# Patient Record
Sex: MALE | Race: WHITE | URBAN - METROPOLITAN AREA
[De-identification: names, ages, dates, MRNs, and addresses within clinical notes are randomized per-mention and may not be internally consistent; named-entity substitution may affect disease eponyms.]

---

## 2017-01-01 ENCOUNTER — NURSING HOME VISIT (OUTPATIENT)
Dept: GERIATRICS | Facility: CLINIC | Age: 82
End: 2017-01-01
Payer: MEDICARE

## 2017-01-01 ENCOUNTER — HOSPITAL ENCOUNTER (INPATIENT)
Facility: CLINIC | Age: 82
LOS: 13 days | Discharge: HOSPICE | DRG: 871 | End: 2017-09-29
Attending: EMERGENCY MEDICINE | Admitting: INTERNAL MEDICINE
Payer: MEDICARE

## 2017-01-01 ENCOUNTER — APPOINTMENT (OUTPATIENT)
Dept: PHYSICAL THERAPY | Facility: CLINIC | Age: 82
DRG: 871 | End: 2017-01-01
Attending: HOSPITALIST
Payer: MEDICARE

## 2017-01-01 ENCOUNTER — APPOINTMENT (OUTPATIENT)
Dept: SPEECH THERAPY | Facility: CLINIC | Age: 82
DRG: 871 | End: 2017-01-01
Payer: MEDICARE

## 2017-01-01 ENCOUNTER — APPOINTMENT (OUTPATIENT)
Dept: GENERAL RADIOLOGY | Facility: CLINIC | Age: 82
DRG: 871 | End: 2017-01-01
Attending: HOSPITALIST
Payer: MEDICARE

## 2017-01-01 ENCOUNTER — APPOINTMENT (OUTPATIENT)
Dept: OCCUPATIONAL THERAPY | Facility: CLINIC | Age: 82
DRG: 871 | End: 2017-01-01
Payer: MEDICARE

## 2017-01-01 ENCOUNTER — APPOINTMENT (OUTPATIENT)
Dept: GENERAL RADIOLOGY | Facility: CLINIC | Age: 82
DRG: 871 | End: 2017-01-01
Attending: INTERNAL MEDICINE
Payer: MEDICARE

## 2017-01-01 ENCOUNTER — OFFICE VISIT (OUTPATIENT)
Dept: FAMILY MEDICINE | Facility: CLINIC | Age: 82
End: 2017-01-01

## 2017-01-01 ENCOUNTER — APPOINTMENT (OUTPATIENT)
Dept: SPEECH THERAPY | Facility: CLINIC | Age: 82
DRG: 871 | End: 2017-01-01
Attending: HOSPITALIST
Payer: MEDICARE

## 2017-01-01 ENCOUNTER — APPOINTMENT (OUTPATIENT)
Dept: GENERAL RADIOLOGY | Facility: CLINIC | Age: 82
DRG: 871 | End: 2017-01-01
Attending: EMERGENCY MEDICINE
Payer: MEDICARE

## 2017-01-01 ENCOUNTER — APPOINTMENT (OUTPATIENT)
Dept: OCCUPATIONAL THERAPY | Facility: CLINIC | Age: 82
DRG: 871 | End: 2017-01-01
Attending: HOSPITALIST
Payer: MEDICARE

## 2017-01-01 ENCOUNTER — APPOINTMENT (OUTPATIENT)
Dept: CT IMAGING | Facility: CLINIC | Age: 82
DRG: 871 | End: 2017-01-01
Attending: INTERNAL MEDICINE
Payer: MEDICARE

## 2017-01-01 ENCOUNTER — APPOINTMENT (OUTPATIENT)
Dept: CARDIOLOGY | Facility: CLINIC | Age: 82
DRG: 871 | End: 2017-01-01
Attending: INTERNAL MEDICINE
Payer: MEDICARE

## 2017-01-01 VITALS
RESPIRATION RATE: 20 BRPM | TEMPERATURE: 98.8 F | SYSTOLIC BLOOD PRESSURE: 121 MMHG | BODY MASS INDEX: 17.14 KG/M2 | DIASTOLIC BLOOD PRESSURE: 77 MMHG | HEART RATE: 125 BPM | OXYGEN SATURATION: 97 % | OXYGEN SATURATION: 90 % | WEIGHT: 144.5 LBS | RESPIRATION RATE: 16 BRPM | WEIGHT: 144.5 LBS | HEART RATE: 98 BPM | BODY MASS INDEX: 17.14 KG/M2 | SYSTOLIC BLOOD PRESSURE: 107 MMHG | DIASTOLIC BLOOD PRESSURE: 68 MMHG | TEMPERATURE: 98.4 F

## 2017-01-01 VITALS
RESPIRATION RATE: 16 BRPM | OXYGEN SATURATION: 92 % | HEART RATE: 90 BPM | BODY MASS INDEX: 17.03 KG/M2 | DIASTOLIC BLOOD PRESSURE: 68 MMHG | SYSTOLIC BLOOD PRESSURE: 118 MMHG | HEIGHT: 77 IN | WEIGHT: 144.2 LBS

## 2017-01-01 VITALS
WEIGHT: 140 LBS | OXYGEN SATURATION: 98 % | DIASTOLIC BLOOD PRESSURE: 74 MMHG | HEART RATE: 94 BPM | BODY MASS INDEX: 16.53 KG/M2 | RESPIRATION RATE: 16 BRPM | TEMPERATURE: 97.5 F | SYSTOLIC BLOOD PRESSURE: 106 MMHG | HEIGHT: 77 IN

## 2017-01-01 VITALS
SYSTOLIC BLOOD PRESSURE: 110 MMHG | DIASTOLIC BLOOD PRESSURE: 76 MMHG | BODY MASS INDEX: 16.65 KG/M2 | HEART RATE: 84 BPM | HEIGHT: 77 IN | OXYGEN SATURATION: 96 % | TEMPERATURE: 98 F | WEIGHT: 141 LBS | RESPIRATION RATE: 16 BRPM

## 2017-01-01 VITALS
TEMPERATURE: 99 F | RESPIRATION RATE: 20 BRPM | HEART RATE: 105 BPM | DIASTOLIC BLOOD PRESSURE: 73 MMHG | OXYGEN SATURATION: 93 % | BODY MASS INDEX: 17.14 KG/M2 | SYSTOLIC BLOOD PRESSURE: 107 MMHG | WEIGHT: 144.5 LBS

## 2017-01-01 VITALS
HEART RATE: 71 BPM | DIASTOLIC BLOOD PRESSURE: 62 MMHG | RESPIRATION RATE: 16 BRPM | SYSTOLIC BLOOD PRESSURE: 103 MMHG | WEIGHT: 144.4 LBS | BODY MASS INDEX: 17.12 KG/M2 | OXYGEN SATURATION: 92 % | TEMPERATURE: 98.7 F

## 2017-01-01 VITALS
HEART RATE: 101 BPM | OXYGEN SATURATION: 95 % | WEIGHT: 144.5 LBS | RESPIRATION RATE: 16 BRPM | DIASTOLIC BLOOD PRESSURE: 63 MMHG | SYSTOLIC BLOOD PRESSURE: 93 MMHG | TEMPERATURE: 98.8 F | BODY MASS INDEX: 17.14 KG/M2

## 2017-01-01 DIAGNOSIS — I50.9 HEART FAILURE, UNSPECIFIED HEART FAILURE CHRONICITY, UNSPECIFIED HEART FAILURE TYPE: ICD-10-CM

## 2017-01-01 DIAGNOSIS — R09.81 NASAL CONGESTION: ICD-10-CM

## 2017-01-01 DIAGNOSIS — R19.5 LOOSE STOOLS: ICD-10-CM

## 2017-01-01 DIAGNOSIS — Z87.09 HISTORY OF COPD: ICD-10-CM

## 2017-01-01 DIAGNOSIS — R31.9 HEMATURIA: ICD-10-CM

## 2017-01-01 DIAGNOSIS — I24.89 DEMAND ISCHEMIA (H): ICD-10-CM

## 2017-01-01 DIAGNOSIS — A41.9 SEVERE SEPSIS (H): ICD-10-CM

## 2017-01-01 DIAGNOSIS — Z51.5 HOSPICE CARE PATIENT: ICD-10-CM

## 2017-01-01 DIAGNOSIS — R65.20 SEVERE SEPSIS (H): ICD-10-CM

## 2017-01-01 DIAGNOSIS — J44.9 CHRONIC OBSTRUCTIVE PULMONARY DISEASE, UNSPECIFIED COPD TYPE (H): ICD-10-CM

## 2017-01-01 DIAGNOSIS — R31.9 BLOOD IN URINE: Primary | ICD-10-CM

## 2017-01-01 DIAGNOSIS — G89.3 CANCER ASSOCIATED PAIN: ICD-10-CM

## 2017-01-01 DIAGNOSIS — J96.01 ACUTE RESPIRATORY FAILURE WITH HYPOXIA (H): ICD-10-CM

## 2017-01-01 DIAGNOSIS — N32.89 BLADDER MASS: ICD-10-CM

## 2017-01-01 DIAGNOSIS — Z23 NEED FOR PNEUMOCOCCAL VACCINE: ICD-10-CM

## 2017-01-01 DIAGNOSIS — G50.0 TRIGEMINAL NEURALGIA: ICD-10-CM

## 2017-01-01 DIAGNOSIS — J18.9 PNEUMONIA DUE TO INFECTIOUS ORGANISM, UNSPECIFIED LATERALITY, UNSPECIFIED PART OF LUNG: Primary | ICD-10-CM

## 2017-01-01 DIAGNOSIS — G47.00 INSOMNIA, UNSPECIFIED TYPE: Primary | ICD-10-CM

## 2017-01-01 DIAGNOSIS — J18.9 PNEUMONIA DUE TO INFECTIOUS ORGANISM, UNSPECIFIED LATERALITY, UNSPECIFIED PART OF LUNG: ICD-10-CM

## 2017-01-01 DIAGNOSIS — R53.83 FATIGUE, UNSPECIFIED TYPE: ICD-10-CM

## 2017-01-01 DIAGNOSIS — Z85.51 PERSONAL HISTORY OF MALIGNANT NEOPLASM OF BLADDER: Primary | ICD-10-CM

## 2017-01-01 DIAGNOSIS — I71.40 ABDOMINAL AORTIC ANEURYSM (AAA) WITHOUT RUPTURE (H): ICD-10-CM

## 2017-01-01 DIAGNOSIS — Z76.0 ENCOUNTER FOR MEDICATION REFILL: ICD-10-CM

## 2017-01-01 DIAGNOSIS — J44.9 CHRONIC OBSTRUCTIVE PULMONARY DISEASE, UNSPECIFIED COPD TYPE (H): Primary | ICD-10-CM

## 2017-01-01 DIAGNOSIS — K40.90 HERNIA, INGUINAL, RIGHT: ICD-10-CM

## 2017-01-01 DIAGNOSIS — R82.90 ABNORMAL URINALYSIS: ICD-10-CM

## 2017-01-01 DIAGNOSIS — R64 CACHECTIC (H): Primary | ICD-10-CM

## 2017-01-01 DIAGNOSIS — G47.30 SLEEP APNEA, UNSPECIFIED TYPE: ICD-10-CM

## 2017-01-01 DIAGNOSIS — N17.9 ACUTE KIDNEY INJURY (H): ICD-10-CM

## 2017-01-01 DIAGNOSIS — Z51.5 END OF LIFE CARE: ICD-10-CM

## 2017-01-01 LAB
% GRANULOCYTES: 75.1 % (ref 42.2–75.2)
% GRANULOCYTES: 82.4 % (ref 42.2–75.2)
ALBUMIN SERPL-MCNC: 1.4 G/DL (ref 3.4–5)
ALBUMIN SERPL-MCNC: 1.6 G/DL (ref 3.4–5)
ALBUMIN SERPL-MCNC: 2 G/DL (ref 3.4–5)
ALBUMIN SERPL-MCNC: 4 G/DL (ref 3.5–4.7)
ALBUMIN SERPL-MCNC: 4 G/DL (ref 3.5–4.7)
ALBUMIN UR-MCNC: 30 MG/DL
ALBUMIN/GLOB SERPL: 1.6 {RATIO} (ref 1.2–2.2)
ALBUMIN/GLOB SERPL: 1.7 {RATIO} (ref 1.2–2.2)
ALP SERPL-CCNC: 126 U/L (ref 40–150)
ALP SERPL-CCNC: 87 IU/L (ref 39–117)
ALP SERPL-CCNC: 89 IU/L (ref 39–117)
ALP SERPL-CCNC: 93 U/L (ref 40–150)
ALT SERPL W P-5'-P-CCNC: 15 U/L (ref 0–70)
ALT SERPL W P-5'-P-CCNC: 29 U/L (ref 0–70)
ALT SERPL-CCNC: 10 IU/L (ref 0–44)
ALT SERPL-CCNC: 10 IU/L (ref 0–44)
ANION GAP SERPL CALCULATED.3IONS-SCNC: 13 MMOL/L (ref 3–14)
ANION GAP SERPL CALCULATED.3IONS-SCNC: 6 MMOL/L (ref 3–14)
ANION GAP SERPL CALCULATED.3IONS-SCNC: 7 MMOL/L (ref 3–14)
ANION GAP SERPL CALCULATED.3IONS-SCNC: 8 MMOL/L (ref 3–14)
ANION GAP SERPL CALCULATED.3IONS-SCNC: 8 MMOL/L (ref 3–14)
APPEARANCE UR: ABNORMAL
AST SERPL W P-5'-P-CCNC: 20 U/L (ref 0–45)
AST SERPL W P-5'-P-CCNC: 35 U/L (ref 0–45)
AST SERPL-CCNC: 18 IU/L (ref 0–40)
AST SERPL-CCNC: 22 IU/L (ref 0–40)
BACTERIA SPEC CULT: ABNORMAL
BACTERIA SPEC CULT: NO GROWTH
BACTERIA URINE: 0
BASOPHILS # BLD AUTO: 0 10E9/L (ref 0–0.2)
BASOPHILS # BLD AUTO: 0.1 10E9/L (ref 0–0.2)
BASOPHILS NFR BLD AUTO: 0.1 %
BASOPHILS NFR BLD AUTO: 0.2 %
BILIRUB SERPL-MCNC: 0.4 MG/DL (ref 0.2–1.3)
BILIRUB SERPL-MCNC: 0.5 MG/DL (ref 0–1.2)
BILIRUB SERPL-MCNC: 0.5 MG/DL (ref 0–1.2)
BILIRUB SERPL-MCNC: 0.8 MG/DL (ref 0.2–1.3)
BILIRUB UR QL STRIP: ABNORMAL
BILIRUB UR QL STRIP: NEGATIVE
BLOOD URINE DIP: ABNORMAL
BUN SERPL-MCNC: 17 MG/DL (ref 8–27)
BUN SERPL-MCNC: 19 MG/DL (ref 8–27)
BUN SERPL-MCNC: 38 MG/DL (ref 7–30)
BUN SERPL-MCNC: 50 MG/DL (ref 7–30)
BUN SERPL-MCNC: 52 MG/DL (ref 7–30)
BUN SERPL-MCNC: 69 MG/DL (ref 7–30)
BUN SERPL-MCNC: 85 MG/DL (ref 7–30)
BUN/CREATININE RATIO: 24 (ref 10–24)
BUN/CREATININE RATIO: 27 (ref 10–24)
CALCIUM SERPL-MCNC: 7.8 MG/DL (ref 8.5–10.1)
CALCIUM SERPL-MCNC: 8 MG/DL (ref 8.5–10.1)
CALCIUM SERPL-MCNC: 8 MG/DL (ref 8.5–10.1)
CALCIUM SERPL-MCNC: 8.2 MG/DL (ref 8.5–10.1)
CALCIUM SERPL-MCNC: 8.8 MG/DL (ref 8.5–10.1)
CALCIUM SERPL-MCNC: 9.1 MG/DL (ref 8.6–10.2)
CALCIUM SERPL-MCNC: 9.2 MG/DL (ref 8.6–10.2)
CASTS/LPF: 0
CHLORIDE SERPL-SCNC: 100 MMOL/L (ref 94–109)
CHLORIDE SERPL-SCNC: 100 MMOL/L (ref 94–109)
CHLORIDE SERPL-SCNC: 101 MMOL/L (ref 94–109)
CHLORIDE SERPL-SCNC: 104 MMOL/L (ref 94–109)
CHLORIDE SERPL-SCNC: 107 MMOL/L (ref 94–109)
CHLORIDE SERPLBLD-SCNC: 103 MMOL/L (ref 96–106)
CHLORIDE SERPLBLD-SCNC: 98 MMOL/L (ref 96–106)
CHOLEST SERPL-MCNC: 58 MG/DL
CO2 BLDCOV-SCNC: 23 MMOL/L (ref 21–28)
CO2 SERPL-SCNC: 23 MMOL/L (ref 20–32)
CO2 SERPL-SCNC: 24 MMOL/L (ref 20–32)
CO2 SERPL-SCNC: 27 MMOL/L (ref 20–32)
CO2 SERPL-SCNC: 33 MMOL/L (ref 20–32)
CO2 SERPL-SCNC: 34 MMOL/L (ref 20–32)
COLOR UR AUTO: YELLOW
COLOR UR: ABNORMAL
CREAT SERPL-MCNC: 0.66 MG/DL (ref 0.66–1.25)
CREAT SERPL-MCNC: 0.7 MG/DL (ref 0.76–1.27)
CREAT SERPL-MCNC: 0.71 MG/DL (ref 0.76–1.27)
CREAT SERPL-MCNC: 0.72 MG/DL (ref 0.66–1.25)
CREAT SERPL-MCNC: 0.76 MG/DL (ref 0.66–1.25)
CREAT SERPL-MCNC: 0.94 MG/DL (ref 0.66–1.25)
CREAT SERPL-MCNC: 1.36 MG/DL (ref 0.66–1.25)
CREAT SERPL-MCNC: 1.97 MG/DL (ref 0.66–1.25)
CRYSTAL URINE: 0
DIFFERENTIAL METHOD BLD: ABNORMAL
EGFR IF AFRICN AM: 102 ML/MIN/1.73
EGFR IF AFRICN AM: 102 ML/MIN/1.73
EGFR IF NONAFRICN AM: 88 ML/MIN/1.73
EGFR IF NONAFRICN AM: 89 ML/MIN/1.73
EOSINOPHIL # BLD AUTO: 0 10E9/L (ref 0–0.7)
EOSINOPHIL # BLD AUTO: 0.1 10E9/L (ref 0–0.7)
EOSINOPHIL NFR BLD AUTO: 0 %
EOSINOPHIL NFR BLD AUTO: 0 %
EOSINOPHIL NFR BLD AUTO: 0.1 %
EOSINOPHIL NFR BLD AUTO: 0.4 %
EPITHELIAL CELLS - QUEST: ABNORMAL
ERYTHROCYTE [DISTWIDTH] IN BLOOD BY AUTOMATED COUNT: 13.7 % (ref 10–15)
ERYTHROCYTE [DISTWIDTH] IN BLOOD BY AUTOMATED COUNT: 13.7 % (ref 10–15)
ERYTHROCYTE [DISTWIDTH] IN BLOOD BY AUTOMATED COUNT: 13.8 % (ref 10–15)
ERYTHROCYTE [DISTWIDTH] IN BLOOD BY AUTOMATED COUNT: 13.9 % (ref 10–15)
ERYTHROCYTE [DISTWIDTH] IN BLOOD BY AUTOMATED COUNT: 14 % (ref 10–15)
GFR SERPL CREATININE-BSD FRML MDRD: 33 ML/MIN/1.7M2
GFR SERPL CREATININE-BSD FRML MDRD: 50 ML/MIN/1.7M2
GFR SERPL CREATININE-BSD FRML MDRD: 77 ML/MIN/1.7M2
GFR SERPL CREATININE-BSD FRML MDRD: >90 ML/MIN/1.7M2
GLOBULIN, TOTAL: 2.3 G/DL (ref 1.5–4.5)
GLOBULIN, TOTAL: 2.5 G/DL (ref 1.5–4.5)
GLUCOSE BLDC GLUCOMTR-MCNC: 145 MG/DL (ref 70–99)
GLUCOSE SERPL-MCNC: 144 MG/DL (ref 70–99)
GLUCOSE SERPL-MCNC: 146 MG/DL (ref 70–99)
GLUCOSE SERPL-MCNC: 148 MG/DL (ref 70–99)
GLUCOSE SERPL-MCNC: 154 MG/DL (ref 70–99)
GLUCOSE SERPL-MCNC: 74 MG/DL (ref 65–99)
GLUCOSE SERPL-MCNC: 97 MG/DL (ref 65–99)
GLUCOSE SERPL-MCNC: 98 MG/DL (ref 70–99)
GLUCOSE UR STRIP-MCNC: 0 MG/DL
GLUCOSE UR STRIP-MCNC: NEGATIVE MG/DL
GRAM STN SPEC: ABNORMAL
HCT VFR BLD AUTO: 40.2 % (ref 39–51)
HCT VFR BLD AUTO: 41 % (ref 40–53)
HCT VFR BLD AUTO: 41.1 % (ref 39–51)
HCT VFR BLD AUTO: 42.1 % (ref 40–53)
HCT VFR BLD AUTO: 42.8 % (ref 40–53)
HCT VFR BLD AUTO: 44.7 % (ref 40–53)
HCT VFR BLD AUTO: 45.7 % (ref 40–53)
HDLC SERPL-MCNC: 13 MG/DL
HEMOGLOBIN: 13.5 G/DL (ref 13.4–17.5)
HEMOGLOBIN: 13.5 G/DL (ref 13.4–17.5)
HGB BLD-MCNC: 13.6 G/DL (ref 13.3–17.7)
HGB BLD-MCNC: 14.2 G/DL (ref 13.3–17.7)
HGB BLD-MCNC: 14.7 G/DL (ref 13.3–17.7)
HGB BLD-MCNC: 14.9 G/DL (ref 13.3–17.7)
HGB BLD-MCNC: 15.6 G/DL (ref 13.3–17.7)
HGB UR QL STRIP: ABNORMAL
HYALINE CASTS #/AREA URNS LPF: 4 /LPF (ref 0–2)
IMM GRANULOCYTES # BLD: 0.3 10E9/L (ref 0–0.4)
IMM GRANULOCYTES # BLD: 0.6 10E9/L (ref 0–0.4)
IMM GRANULOCYTES # BLD: 0.8 10E9/L (ref 0–0.4)
IMM GRANULOCYTES # BLD: 1.2 10E9/L (ref 0–0.4)
IMM GRANULOCYTES NFR BLD: 1.7 %
IMM GRANULOCYTES NFR BLD: 2.3 %
IMM GRANULOCYTES NFR BLD: 4.5 %
IMM GRANULOCYTES NFR BLD: 4.7 %
INTERPRETATION ECG - MUSE: NORMAL
KETONES UR QL STRIP: ABNORMAL
KETONES UR STRIP-MCNC: NEGATIVE MG/DL
LACTATE BLD-SCNC: 0.9 MMOL/L (ref 0.7–2)
LACTATE BLD-SCNC: 1 MMOL/L (ref 0.7–2)
LACTATE BLD-SCNC: 1.3 MMOL/L (ref 0.7–2)
LACTATE BLD-SCNC: 1.6 MMOL/L (ref 0.7–2)
LACTATE BLD-SCNC: 1.6 MMOL/L (ref 0.7–2)
LACTATE BLD-SCNC: 1.8 MMOL/L (ref 0.7–2)
LACTATE BLD-SCNC: 2.2 MMOL/L (ref 0.7–2.1)
LACTATE SERPL-SCNC: 1.6 MMOL/L (ref 0.4–2)
LDLC SERPL CALC-MCNC: 26 MG/DL
LEUKOCYTE ESTERASE UR QL STRIP: NEGATIVE
LEUKOCYTE ESTERASE URINE DIP: 0
LYMPHOCYTES # BLD AUTO: 0.5 10E9/L (ref 0.8–5.3)
LYMPHOCYTES # BLD AUTO: 0.9 10E9/L (ref 0.8–5.3)
LYMPHOCYTES # BLD AUTO: 1 10E9/L (ref 0.8–5.3)
LYMPHOCYTES # BLD AUTO: 1 10E9/L (ref 0.8–5.3)
LYMPHOCYTES NFR BLD AUTO: 13.7 % (ref 20.5–51.1)
LYMPHOCYTES NFR BLD AUTO: 19.2 % (ref 20.5–51.1)
LYMPHOCYTES NFR BLD AUTO: 3.3 %
LYMPHOCYTES NFR BLD AUTO: 3.6 %
LYMPHOCYTES NFR BLD AUTO: 4.2 %
LYMPHOCYTES NFR BLD AUTO: 6 %
Lab: ABNORMAL
Lab: ABNORMAL
Lab: NORMAL
MAGNESIUM SERPL-MCNC: 2.3 MG/DL (ref 1.6–2.3)
MCH RBC QN AUTO: 30.9 PG (ref 27–31)
MCH RBC QN AUTO: 31.8 PG (ref 27–31)
MCH RBC QN AUTO: 32 PG (ref 26.5–33)
MCH RBC QN AUTO: 32.1 PG (ref 26.5–33)
MCH RBC QN AUTO: 32.2 PG (ref 26.5–33)
MCH RBC QN AUTO: 32.4 PG (ref 26.5–33)
MCH RBC QN AUTO: 32.9 PG (ref 26.5–33)
MCHC RBC AUTO-ENTMCNC: 32.9 G/DL (ref 31.5–36.5)
MCHC RBC AUTO-ENTMCNC: 32.9 G/DL (ref 33–37)
MCHC RBC AUTO-ENTMCNC: 33.2 G/DL (ref 31.5–36.5)
MCHC RBC AUTO-ENTMCNC: 33.2 G/DL (ref 31.5–36.5)
MCHC RBC AUTO-ENTMCNC: 33.5 G/DL (ref 33–37)
MCHC RBC AUTO-ENTMCNC: 34.1 G/DL (ref 31.5–36.5)
MCHC RBC AUTO-ENTMCNC: 35.4 G/DL (ref 31.5–36.5)
MCV RBC AUTO: 93 FL (ref 78–100)
MCV RBC AUTO: 93.8 FL (ref 80–100)
MCV RBC AUTO: 94.9 FL (ref 80–100)
MCV RBC AUTO: 95 FL (ref 78–100)
MCV RBC AUTO: 97 FL (ref 78–100)
MONOCYTES # BLD AUTO: 0.4 10E9/L (ref 0–1.3)
MONOCYTES # BLD AUTO: 0.7 10E9/L (ref 0–1.3)
MONOCYTES # BLD AUTO: 1.5 10E9/L (ref 0–1.3)
MONOCYTES # BLD AUTO: 1.6 10E9/L (ref 0–1.3)
MONOCYTES NFR BLD AUTO: 2.6 %
MONOCYTES NFR BLD AUTO: 2.7 %
MONOCYTES NFR BLD AUTO: 3.9 % (ref 1.7–9.3)
MONOCYTES NFR BLD AUTO: 5.7 % (ref 1.7–9.3)
MONOCYTES NFR BLD AUTO: 6.3 %
MONOCYTES NFR BLD AUTO: 9.6 %
MUCOUS THREADS #/AREA URNS LPF: PRESENT /LPF
MUCOUS URINE: 0
NEUTROPHILS # BLD AUTO: 13.4 10E9/L (ref 1.6–8.3)
NEUTROPHILS # BLD AUTO: 14.7 10E9/L (ref 1.6–8.3)
NEUTROPHILS # BLD AUTO: 20.8 10E9/L (ref 1.6–8.3)
NEUTROPHILS # BLD AUTO: 22.2 10E9/L (ref 1.6–8.3)
NEUTROPHILS NFR BLD AUTO: 84.2 %
NEUTROPHILS NFR BLD AUTO: 85.1 %
NEUTROPHILS NFR BLD AUTO: 86.7 %
NEUTROPHILS NFR BLD AUTO: 91.3 %
NITRATE UR QL: NEGATIVE
NITRITE UR QL STRIP: ABNORMAL
NONHDLC SERPL-MCNC: 45 MG/DL
NRBC # BLD AUTO: 0 10*3/UL
NRBC BLD AUTO-RTO: 0 /100
NT-PROBNP SERPL-MCNC: 962 PG/ML (ref 0–1800)
PCO2 BLDV: 34 MM HG (ref 40–50)
PH BLDV: 7.43 PH (ref 7.32–7.43)
PH UR STRIP: 5.5 PH (ref 5–7)
PH UR STRIP: 6.5 PH (ref 5–9)
PHOSPHATE SERPL-MCNC: 1.5 MG/DL (ref 2.5–4.5)
PLATELET # BLD AUTO: 165 K/UL (ref 140–450)
PLATELET # BLD AUTO: 170 K/UL (ref 140–450)
PLATELET # BLD AUTO: 249 10E9/L (ref 150–450)
PLATELET # BLD AUTO: 253 10E9/L (ref 150–450)
PLATELET # BLD AUTO: 259 10E9/L (ref 150–450)
PLATELET # BLD AUTO: 290 10E9/L (ref 150–450)
PLATELET # BLD AUTO: 315 10E9/L (ref 150–450)
PLATELET # BLD AUTO: 405 10E9/L (ref 150–450)
PO2 BLDV: 59 MM HG (ref 25–47)
POTASSIUM SERPL-SCNC: 3.2 MMOL/L (ref 3.4–5.3)
POTASSIUM SERPL-SCNC: 3.4 MMOL/L (ref 3.4–5.3)
POTASSIUM SERPL-SCNC: 3.5 MMOL/L (ref 3.4–5.3)
POTASSIUM SERPL-SCNC: 3.8 MMOL/L (ref 3.4–5.3)
POTASSIUM SERPL-SCNC: 3.9 MMOL/L (ref 3.4–5.3)
POTASSIUM SERPL-SCNC: 4.1 MMOL/L (ref 3.5–5.2)
POTASSIUM SERPL-SCNC: 4.4 MMOL/L (ref 3.5–5.2)
PROCALCITONIN SERPL-MCNC: 3.44 NG/ML
PROCALCITONIN SERPL-MCNC: 5.22 NG/ML
PROT SERPL-MCNC: 5.4 G/DL (ref 6.8–8.8)
PROT SERPL-MCNC: 6.2 G/DL (ref 6.8–8.8)
PROT SERPL-MCNC: 6.3 G/DL (ref 6–8.5)
PROT SERPL-MCNC: 6.5 G/DL (ref 6–8.5)
PROT UR QL: ABNORMAL MG/DL (ref ?–0.01)
RBC # BLD AUTO: 4.23 X10/CMM (ref 4.2–5.9)
RBC # BLD AUTO: 4.24 10E12/L (ref 4.4–5.9)
RBC # BLD AUTO: 4.38 X10/CMM (ref 4.2–5.9)
RBC # BLD AUTO: 4.41 10E12/L (ref 4.4–5.9)
RBC # BLD AUTO: 4.53 10E12/L (ref 4.4–5.9)
RBC # BLD AUTO: 4.6 10E12/L (ref 4.4–5.9)
RBC # BLD AUTO: 4.81 10E12/L (ref 4.4–5.9)
RBC #/AREA URNS AUTO: 7 /HPF (ref 0–2)
RBC URINE: ABNORMAL (ref 0–3)
S PNEUM AG SPEC QL: NORMAL
SAO2 % BLDV FROM PO2: 91 %
SODIUM SERPL-SCNC: 136 MMOL/L (ref 133–144)
SODIUM SERPL-SCNC: 137 MMOL/L (ref 134–144)
SODIUM SERPL-SCNC: 139 MMOL/L (ref 133–144)
SODIUM SERPL-SCNC: 139 MMOL/L (ref 133–144)
SODIUM SERPL-SCNC: 140 MMOL/L (ref 133–144)
SODIUM SERPL-SCNC: 141 MMOL/L (ref 133–144)
SODIUM SERPL-SCNC: 142 MMOL/L (ref 134–144)
SOURCE: ABNORMAL
SP GR UR STRIP: 1.02 (ref 1–1.02)
SP GR UR STRIP: 1.02 (ref 1–1.03)
SPECIMEN SOURCE: ABNORMAL
SPECIMEN SOURCE: NORMAL
T4 FREE SERPL-MCNC: 1.33 NG/DL (ref 0.82–1.77)
TOTAL CO2: 26 MMOL/L (ref 18–28)
TOTAL CO2: 27 MMOL/L (ref 18–28)
TRIGL SERPL-MCNC: 97 MG/DL
TROPONIN I SERPL-MCNC: 0.47 UG/L (ref 0–0.04)
TROPONIN I SERPL-MCNC: 0.57 UG/L (ref 0–0.04)
TROPONIN I SERPL-MCNC: 0.97 UG/L (ref 0–0.04)
TSH BLD-ACNC: 1.77 UIU/ML (ref 0.45–4.5)
UROBILINOGEN UR QL STRIP: 1 EU/DL (ref 0.2–1)
UROBILINOGEN UR STRIP-MCNC: NORMAL MG/DL (ref 0–2)
WBC # BLD AUTO: 15.7 10E9/L (ref 4–11)
WBC # BLD AUTO: 17 10E9/L (ref 4–11)
WBC # BLD AUTO: 19.4 10E9/L (ref 4–11)
WBC # BLD AUTO: 24.3 10E9/L (ref 4–11)
WBC # BLD AUTO: 24.7 10E9/L (ref 4–11)
WBC # BLD AUTO: 7.2 X10/CMM (ref 3.8–11)
WBC # BLD AUTO: 9.9 X10/CMM (ref 3.8–11)
WBC #/AREA URNS AUTO: 13 /HPF (ref 0–2)
WBC URINE: 0 (ref 0–3)

## 2017-01-01 PROCEDURE — A9270 NON-COVERED ITEM OR SERVICE: HCPCS | Mod: GY | Performed by: INTERNAL MEDICINE

## 2017-01-01 PROCEDURE — 92526 ORAL FUNCTION THERAPY: CPT | Mod: GN | Performed by: SPEECH-LANGUAGE PATHOLOGIST

## 2017-01-01 PROCEDURE — 36415 COLL VENOUS BLD VENIPUNCTURE: CPT | Performed by: INTERNAL MEDICINE

## 2017-01-01 PROCEDURE — 99223 1ST HOSP IP/OBS HIGH 75: CPT | Mod: AI | Performed by: INTERNAL MEDICINE

## 2017-01-01 PROCEDURE — 99309 SBSQ NF CARE MODERATE MDM 30: CPT | Mod: GW | Performed by: NURSE PRACTITIONER

## 2017-01-01 PROCEDURE — 99214 OFFICE O/P EST MOD 30 MIN: CPT | Performed by: FAMILY MEDICINE

## 2017-01-01 PROCEDURE — 40000133 ZZH STATISTIC OT WARD VISIT: Performed by: OCCUPATIONAL THERAPY ASSISTANT

## 2017-01-01 PROCEDURE — 12000000 ZZH R&B MED SURG/OB

## 2017-01-01 PROCEDURE — 25900017 H RX MED GY IP 259 OP 259 PS 637: Mod: GY | Performed by: INTERNAL MEDICINE

## 2017-01-01 PROCEDURE — 40000225 ZZH STATISTIC SLP WARD VISIT: Performed by: SPEECH-LANGUAGE PATHOLOGIST

## 2017-01-01 PROCEDURE — 25000128 H RX IP 250 OP 636: Performed by: HOSPITALIST

## 2017-01-01 PROCEDURE — 84484 ASSAY OF TROPONIN QUANT: CPT | Performed by: INTERNAL MEDICINE

## 2017-01-01 PROCEDURE — 25000128 H RX IP 250 OP 636: Performed by: INTERNAL MEDICINE

## 2017-01-01 PROCEDURE — 99223 1ST HOSP IP/OBS HIGH 75: CPT | Performed by: NURSE PRACTITIONER

## 2017-01-01 PROCEDURE — 94640 AIRWAY INHALATION TREATMENT: CPT

## 2017-01-01 PROCEDURE — 36415 COLL VENOUS BLD VENIPUNCTURE: CPT | Performed by: FAMILY MEDICINE

## 2017-01-01 PROCEDURE — 25000132 ZZH RX MED GY IP 250 OP 250 PS 637: Mod: GY | Performed by: INTERNAL MEDICINE

## 2017-01-01 PROCEDURE — 83605 ASSAY OF LACTIC ACID: CPT | Performed by: INTERNAL MEDICINE

## 2017-01-01 PROCEDURE — 25000132 ZZH RX MED GY IP 250 OP 250 PS 637: Mod: GY | Performed by: HOSPITALIST

## 2017-01-01 PROCEDURE — 71020 XR CHEST 2 VW: CPT

## 2017-01-01 PROCEDURE — 99233 SBSQ HOSP IP/OBS HIGH 50: CPT | Performed by: INTERNAL MEDICINE

## 2017-01-01 PROCEDURE — 92611 MOTION FLUOROSCOPY/SWALLOW: CPT | Mod: GN | Performed by: SPEECH-LANGUAGE PATHOLOGIST

## 2017-01-01 PROCEDURE — 82803 BLOOD GASES ANY COMBINATION: CPT

## 2017-01-01 PROCEDURE — 25000125 ZZHC RX 250: Performed by: HOSPITALIST

## 2017-01-01 PROCEDURE — 40000275 ZZH STATISTIC RCP TIME EA 10 MIN

## 2017-01-01 PROCEDURE — 36415 COLL VENOUS BLD VENIPUNCTURE: CPT | Performed by: HOSPITALIST

## 2017-01-01 PROCEDURE — 94640 AIRWAY INHALATION TREATMENT: CPT | Mod: 76

## 2017-01-01 PROCEDURE — A9270 NON-COVERED ITEM OR SERVICE: HCPCS | Mod: GY | Performed by: HOSPITALIST

## 2017-01-01 PROCEDURE — 40000225 ZZH STATISTIC SLP WARD VISIT

## 2017-01-01 PROCEDURE — 85027 COMPLETE CBC AUTOMATED: CPT | Performed by: INTERNAL MEDICINE

## 2017-01-01 PROCEDURE — 82565 ASSAY OF CREATININE: CPT | Performed by: INTERNAL MEDICINE

## 2017-01-01 PROCEDURE — 80053 COMPREHEN METABOLIC PANEL: CPT | Performed by: EMERGENCY MEDICINE

## 2017-01-01 PROCEDURE — 99207 ZZC CDG-MDM COMPONENT: MEETS MODERATE - UP CODED: CPT | Performed by: INTERNAL MEDICINE

## 2017-01-01 PROCEDURE — 84132 ASSAY OF SERUM POTASSIUM: CPT | Performed by: HOSPITALIST

## 2017-01-01 PROCEDURE — 84145 PROCALCITONIN (PCT): CPT | Performed by: EMERGENCY MEDICINE

## 2017-01-01 PROCEDURE — 40000264 ECHO COMPLETE WITH LUMASON

## 2017-01-01 PROCEDURE — 99232 SBSQ HOSP IP/OBS MODERATE 35: CPT | Performed by: INTERNAL MEDICINE

## 2017-01-01 PROCEDURE — 71260 CT THORAX DX C+: CPT

## 2017-01-01 PROCEDURE — 97530 THERAPEUTIC ACTIVITIES: CPT | Mod: GO | Performed by: OCCUPATIONAL THERAPIST

## 2017-01-01 PROCEDURE — 25000125 ZZHC RX 250: Performed by: INTERNAL MEDICINE

## 2017-01-01 PROCEDURE — 83605 ASSAY OF LACTIC ACID: CPT | Performed by: HOSPITALIST

## 2017-01-01 PROCEDURE — 99309 SBSQ NF CARE MODERATE MDM 30: CPT | Mod: GV | Performed by: NURSE PRACTITIONER

## 2017-01-01 PROCEDURE — 21000000 ZZH R&B IMCU HEART CARE

## 2017-01-01 PROCEDURE — 74230 X-RAY XM SWLNG FUNCJ C+: CPT

## 2017-01-01 PROCEDURE — 92610 EVALUATE SWALLOWING FUNCTION: CPT | Mod: GN

## 2017-01-01 PROCEDURE — 80048 BASIC METABOLIC PNL TOTAL CA: CPT | Performed by: HOSPITALIST

## 2017-01-01 PROCEDURE — 74177 CT ABD & PELVIS W/CONTRAST: CPT

## 2017-01-01 PROCEDURE — 99233 SBSQ HOSP IP/OBS HIGH 50: CPT | Performed by: HOSPITALIST

## 2017-01-01 PROCEDURE — 99232 SBSQ HOSP IP/OBS MODERATE 35: CPT | Performed by: HOSPITALIST

## 2017-01-01 PROCEDURE — 96367 TX/PROPH/DG ADDL SEQ IV INF: CPT

## 2017-01-01 PROCEDURE — 99212 OFFICE O/P EST SF 10 MIN: CPT

## 2017-01-01 PROCEDURE — 87040 BLOOD CULTURE FOR BACTERIA: CPT | Performed by: HOSPITALIST

## 2017-01-01 PROCEDURE — 87086 URINE CULTURE/COLONY COUNT: CPT | Performed by: EMERGENCY MEDICINE

## 2017-01-01 PROCEDURE — 99223 1ST HOSP IP/OBS HIGH 75: CPT | Performed by: INTERNAL MEDICINE

## 2017-01-01 PROCEDURE — 83605 ASSAY OF LACTIC ACID: CPT

## 2017-01-01 PROCEDURE — 40000886 ZZH STATISTIC STEP DOWN HRS DAY

## 2017-01-01 PROCEDURE — 87106 FUNGI IDENTIFICATION YEAST: CPT | Performed by: HOSPITALIST

## 2017-01-01 PROCEDURE — 83605 ASSAY OF LACTIC ACID: CPT | Performed by: EMERGENCY MEDICINE

## 2017-01-01 PROCEDURE — 99214 OFFICE O/P EST MOD 30 MIN: CPT | Mod: 25 | Performed by: FAMILY MEDICINE

## 2017-01-01 PROCEDURE — 84145 PROCALCITONIN (PCT): CPT | Performed by: INTERNAL MEDICINE

## 2017-01-01 PROCEDURE — 84132 ASSAY OF SERUM POTASSIUM: CPT | Performed by: INTERNAL MEDICINE

## 2017-01-01 PROCEDURE — 80048 BASIC METABOLIC PNL TOTAL CA: CPT | Performed by: INTERNAL MEDICINE

## 2017-01-01 PROCEDURE — 40000133 ZZH STATISTIC OT WARD VISIT: Performed by: OCCUPATIONAL THERAPIST

## 2017-01-01 PROCEDURE — 99221 1ST HOSP IP/OBS SF/LOW 40: CPT | Performed by: PSYCHIATRY & NEUROLOGY

## 2017-01-01 PROCEDURE — 85049 AUTOMATED PLATELET COUNT: CPT | Performed by: INTERNAL MEDICINE

## 2017-01-01 PROCEDURE — 85025 COMPLETE CBC W/AUTO DIFF WBC: CPT | Performed by: FAMILY MEDICINE

## 2017-01-01 PROCEDURE — 40000901 ZZH STATISTIC WOC PT EDUCATION, 0-15 MIN

## 2017-01-01 PROCEDURE — 85025 COMPLETE CBC W/AUTO DIFF WBC: CPT | Performed by: EMERGENCY MEDICINE

## 2017-01-01 PROCEDURE — G0009 ADMIN PNEUMOCOCCAL VACCINE: HCPCS | Performed by: FAMILY MEDICINE

## 2017-01-01 PROCEDURE — 40000193 ZZH STATISTIC PT WARD VISIT: Performed by: PHYSICAL THERAPIST

## 2017-01-01 PROCEDURE — 83735 ASSAY OF MAGNESIUM: CPT | Performed by: INTERNAL MEDICINE

## 2017-01-01 PROCEDURE — 36415 COLL VENOUS BLD VENIPUNCTURE: CPT | Performed by: UROLOGY

## 2017-01-01 PROCEDURE — 97161 PT EVAL LOW COMPLEX 20 MIN: CPT | Mod: GP | Performed by: PHYSICAL THERAPIST

## 2017-01-01 PROCEDURE — 87205 SMEAR GRAM STAIN: CPT | Performed by: HOSPITALIST

## 2017-01-01 PROCEDURE — 85025 COMPLETE CBC W/AUTO DIFF WBC: CPT | Performed by: HOSPITALIST

## 2017-01-01 PROCEDURE — 25000128 H RX IP 250 OP 636: Performed by: EMERGENCY MEDICINE

## 2017-01-01 PROCEDURE — 81001 URINALYSIS AUTO W/SCOPE: CPT | Performed by: EMERGENCY MEDICINE

## 2017-01-01 PROCEDURE — 93010 ELECTROCARDIOGRAM REPORT: CPT | Performed by: INTERNAL MEDICINE

## 2017-01-01 PROCEDURE — 96365 THER/PROPH/DIAG IV INF INIT: CPT

## 2017-01-01 PROCEDURE — 80061 LIPID PANEL: CPT | Performed by: INTERNAL MEDICINE

## 2017-01-01 PROCEDURE — 81003 URINALYSIS AUTO W/O SCOPE: CPT | Performed by: FAMILY MEDICINE

## 2017-01-01 PROCEDURE — 99310 SBSQ NF CARE HIGH MDM 45: CPT | Mod: GW | Performed by: NURSE PRACTITIONER

## 2017-01-01 PROCEDURE — 87899 AGENT NOS ASSAY W/OPTIC: CPT | Performed by: INTERNAL MEDICINE

## 2017-01-01 PROCEDURE — 25000125 ZZHC RX 250: Performed by: EMERGENCY MEDICINE

## 2017-01-01 PROCEDURE — 25500064 ZZH RX 255 OP 636: Performed by: INTERNAL MEDICINE

## 2017-01-01 PROCEDURE — 96361 HYDRATE IV INFUSION ADD-ON: CPT

## 2017-01-01 PROCEDURE — 25000132 ZZH RX MED GY IP 250 OP 250 PS 637: Mod: GY | Performed by: PSYCHIATRY & NEUROLOGY

## 2017-01-01 PROCEDURE — 87205 SMEAR GRAM STAIN: CPT | Performed by: INTERNAL MEDICINE

## 2017-01-01 PROCEDURE — 97530 THERAPEUTIC ACTIVITIES: CPT | Mod: GO | Performed by: OCCUPATIONAL THERAPY ASSISTANT

## 2017-01-01 PROCEDURE — 97535 SELF CARE MNGMENT TRAINING: CPT | Mod: GO | Performed by: OCCUPATIONAL THERAPIST

## 2017-01-01 PROCEDURE — 90662 IIV NO PRSV INCREASED AG IM: CPT | Performed by: HOSPITALIST

## 2017-01-01 PROCEDURE — 92526 ORAL FUNCTION THERAPY: CPT | Mod: GN

## 2017-01-01 PROCEDURE — 80053 COMPREHEN METABOLIC PANEL: CPT | Performed by: HOSPITALIST

## 2017-01-01 PROCEDURE — A9270 NON-COVERED ITEM OR SERVICE: HCPCS | Performed by: INTERNAL MEDICINE

## 2017-01-01 PROCEDURE — 87040 BLOOD CULTURE FOR BACTERIA: CPT | Performed by: EMERGENCY MEDICINE

## 2017-01-01 PROCEDURE — 80069 RENAL FUNCTION PANEL: CPT | Performed by: INTERNAL MEDICINE

## 2017-01-01 PROCEDURE — A9270 NON-COVERED ITEM OR SERVICE: HCPCS | Mod: GY | Performed by: PSYCHIATRY & NEUROLOGY

## 2017-01-01 PROCEDURE — 27210339 ZZH CIRCUIT HUMIDITY W/CPAP BIP

## 2017-01-01 PROCEDURE — 93005 ELECTROCARDIOGRAM TRACING: CPT

## 2017-01-01 PROCEDURE — 90732 PPSV23 VACC 2 YRS+ SUBQ/IM: CPT | Performed by: FAMILY MEDICINE

## 2017-01-01 PROCEDURE — 71010 XR CHEST PORT 1 VW: CPT

## 2017-01-01 PROCEDURE — 83605 ASSAY OF LACTIC ACID: CPT | Performed by: UROLOGY

## 2017-01-01 PROCEDURE — 85025 COMPLETE CBC W/AUTO DIFF WBC: CPT | Performed by: INTERNAL MEDICINE

## 2017-01-01 PROCEDURE — 99238 HOSP IP/OBS DSCHRG MGMT 30/<: CPT | Performed by: INTERNAL MEDICINE

## 2017-01-01 PROCEDURE — 92610 EVALUATE SWALLOWING FUNCTION: CPT | Mod: GN | Performed by: SPEECH-LANGUAGE PATHOLOGIST

## 2017-01-01 PROCEDURE — 99285 EMERGENCY DEPT VISIT HI MDM: CPT | Mod: 25

## 2017-01-01 PROCEDURE — 87070 CULTURE OTHR SPECIMN AEROBIC: CPT | Performed by: HOSPITALIST

## 2017-01-01 PROCEDURE — 83880 ASSAY OF NATRIURETIC PEPTIDE: CPT | Performed by: INTERNAL MEDICINE

## 2017-01-01 PROCEDURE — 97166 OT EVAL MOD COMPLEX 45 MIN: CPT | Mod: GO | Performed by: OCCUPATIONAL THERAPIST

## 2017-01-01 PROCEDURE — 99222 1ST HOSP IP/OBS MODERATE 55: CPT | Performed by: INTERNAL MEDICINE

## 2017-01-01 PROCEDURE — 94660 CPAP INITIATION&MGMT: CPT

## 2017-01-01 PROCEDURE — 00000146 ZZHCL STATISTIC GLUCOSE BY METER IP

## 2017-01-01 PROCEDURE — 97530 THERAPEUTIC ACTIVITIES: CPT | Mod: GP | Performed by: PHYSICAL THERAPIST

## 2017-01-01 PROCEDURE — 93306 TTE W/DOPPLER COMPLETE: CPT | Mod: 26 | Performed by: INTERNAL MEDICINE

## 2017-01-01 RX ORDER — POTASSIUM CHLORIDE 1.5 G/1.58G
20-40 POWDER, FOR SOLUTION ORAL
Status: DISCONTINUED | OUTPATIENT
Start: 2017-01-01 | End: 2017-01-01

## 2017-01-01 RX ORDER — POTASSIUM CHLORIDE 1500 MG/1
20-40 TABLET, EXTENDED RELEASE ORAL
Status: DISCONTINUED | OUTPATIENT
Start: 2017-01-01 | End: 2017-01-01

## 2017-01-01 RX ORDER — SENNOSIDES A AND B 8.6 MG/1
1 TABLET, FILM COATED ORAL DAILY
Qty: 120 TABLET | DISCHARGE
Start: 2017-01-01 | End: 2017-01-01

## 2017-01-01 RX ORDER — BISACODYL 10 MG
10 SUPPOSITORY, RECTAL RECTAL DAILY PRN
Qty: 25 SUPPOSITORY | Refills: 1 | DISCHARGE
Start: 2017-01-01

## 2017-01-01 RX ORDER — FUROSEMIDE 10 MG/ML
20 INJECTION INTRAMUSCULAR; INTRAVENOUS ONCE
Status: COMPLETED | OUTPATIENT
Start: 2017-01-01 | End: 2017-01-01

## 2017-01-01 RX ORDER — SENNOSIDES A AND B 8.6 MG/1
1 TABLET, FILM COATED ORAL 2 TIMES DAILY PRN
Qty: 120 TABLET
Start: 2017-01-01 | End: 2017-01-01

## 2017-01-01 RX ORDER — LACTOSE-REDUCED FOOD
LIQUID (ML) ORAL 2 TIMES DAILY
COMMUNITY

## 2017-01-01 RX ORDER — ONDANSETRON 2 MG/ML
4 INJECTION INTRAMUSCULAR; INTRAVENOUS EVERY 6 HOURS PRN
Status: DISCONTINUED | OUTPATIENT
Start: 2017-01-01 | End: 2017-01-01 | Stop reason: HOSPADM

## 2017-01-01 RX ORDER — FUROSEMIDE 10 MG/ML
40 INJECTION INTRAMUSCULAR; INTRAVENOUS ONCE
Status: COMPLETED | OUTPATIENT
Start: 2017-01-01 | End: 2017-01-01

## 2017-01-01 RX ORDER — MORPHINE SULFATE 20 MG/ML
10 SOLUTION ORAL
Refills: 0 | Status: SHIPPED | DISCHARGE
Start: 2017-01-01

## 2017-01-01 RX ORDER — AMOXICILLIN 250 MG
1-2 CAPSULE ORAL 2 TIMES DAILY PRN
Status: DISCONTINUED | OUTPATIENT
Start: 2017-01-01 | End: 2017-01-01 | Stop reason: HOSPADM

## 2017-01-01 RX ORDER — PSEUDOEPHEDRINE HCL 30 MG
30 TABLET ORAL DAILY PRN
Status: DISCONTINUED | OUTPATIENT
Start: 2017-01-01 | End: 2017-01-01 | Stop reason: HOSPADM

## 2017-01-01 RX ORDER — ATORVASTATIN CALCIUM 20 MG/1
20 TABLET, FILM COATED ORAL DAILY
Status: DISCONTINUED | OUTPATIENT
Start: 2017-01-01 | End: 2017-01-01

## 2017-01-01 RX ORDER — HALOPERIDOL 0.5 MG/1
0.5 TABLET ORAL EVERY 6 HOURS PRN
DISCHARGE
Start: 2017-01-01 | End: 2017-01-01

## 2017-01-01 RX ORDER — ALBUTEROL SULFATE 0.83 MG/ML
3 SOLUTION RESPIRATORY (INHALATION)
Status: DISCONTINUED | OUTPATIENT
Start: 2017-01-01 | End: 2017-01-01 | Stop reason: HOSPADM

## 2017-01-01 RX ORDER — ONDANSETRON 4 MG/1
4 TABLET, ORALLY DISINTEGRATING ORAL EVERY 6 HOURS PRN
Status: DISCONTINUED | OUTPATIENT
Start: 2017-01-01 | End: 2017-01-01 | Stop reason: HOSPADM

## 2017-01-01 RX ORDER — LORAZEPAM 0.5 MG/1
0.25 TABLET ORAL EVERY 4 HOURS PRN
Qty: 60 TABLET | Status: SHIPPED | DISCHARGE
Start: 2017-01-01

## 2017-01-01 RX ORDER — POTASSIUM CHLORIDE 7.45 MG/ML
10 INJECTION INTRAVENOUS
Status: DISCONTINUED | OUTPATIENT
Start: 2017-01-01 | End: 2017-01-01

## 2017-01-01 RX ORDER — MORPHINE SULFATE 20 MG/ML
5-10 SOLUTION ORAL
Status: DISCONTINUED | OUTPATIENT
Start: 2017-01-01 | End: 2017-01-01 | Stop reason: HOSPADM

## 2017-01-01 RX ORDER — IOPAMIDOL 755 MG/ML
73 INJECTION, SOLUTION INTRAVASCULAR ONCE
Status: COMPLETED | OUTPATIENT
Start: 2017-01-01 | End: 2017-01-01

## 2017-01-01 RX ORDER — TAMSULOSIN HYDROCHLORIDE 0.4 MG/1
0.4 CAPSULE ORAL WEEKLY
Status: DISCONTINUED | OUTPATIENT
Start: 2017-01-01 | End: 2017-01-01 | Stop reason: HOSPADM

## 2017-01-01 RX ORDER — NYSTATIN 100000 [USP'U]/G
POWDER TOPICAL
COMMUNITY

## 2017-01-01 RX ORDER — DIPHENOXYLATE HCL/ATROPINE 2.5-.025MG
2 TABLET ORAL 4 TIMES DAILY PRN
Qty: 30 TABLET | Refills: 0 | Status: SHIPPED | DISCHARGE
Start: 2017-01-01 | End: 2017-01-01

## 2017-01-01 RX ORDER — LANOLIN ALCOHOL/MO/W.PET/CERES
3 CREAM (GRAM) TOPICAL
Status: DISCONTINUED | OUTPATIENT
Start: 2017-01-01 | End: 2017-01-01 | Stop reason: HOSPADM

## 2017-01-01 RX ORDER — TIOTROPIUM BROMIDE 18 UG/1
18 CAPSULE ORAL; RESPIRATORY (INHALATION) DAILY
Status: DISCONTINUED | OUTPATIENT
Start: 2017-01-01 | End: 2017-01-01 | Stop reason: HOSPADM

## 2017-01-01 RX ORDER — BARIUM SULFATE 400 MG/ML
SUSPENSION ORAL ONCE
Status: DISCONTINUED | OUTPATIENT
Start: 2017-01-01 | End: 2017-01-01 | Stop reason: CLARIF

## 2017-01-01 RX ORDER — TAMSULOSIN HYDROCHLORIDE 0.4 MG/1
CAPSULE ORAL
Qty: 90 CAPSULE | Refills: 0 | Status: SHIPPED | OUTPATIENT
Start: 2017-01-01 | End: 2017-01-01

## 2017-01-01 RX ORDER — ACETAMINOPHEN 325 MG/1
650 TABLET ORAL EVERY 4 HOURS PRN
Status: DISCONTINUED | OUTPATIENT
Start: 2017-01-01 | End: 2017-01-01 | Stop reason: HOSPADM

## 2017-01-01 RX ORDER — IPRATROPIUM BROMIDE AND ALBUTEROL SULFATE 2.5; .5 MG/3ML; MG/3ML
SOLUTION RESPIRATORY (INHALATION)
Qty: 30 VIAL | Refills: 1
Start: 2017-01-01

## 2017-01-01 RX ORDER — FLUTICASONE PROPIONATE 50 MCG
1 SPRAY, SUSPENSION (ML) NASAL DAILY PRN
COMMUNITY

## 2017-01-01 RX ORDER — SODIUM CHLORIDE 9 MG/ML
INJECTION, SOLUTION INTRAVENOUS CONTINUOUS
Status: DISCONTINUED | OUTPATIENT
Start: 2017-01-01 | End: 2017-01-01

## 2017-01-01 RX ORDER — PIPERACILLIN SODIUM, TAZOBACTAM SODIUM 4; .5 G/20ML; G/20ML
4.5 INJECTION, POWDER, LYOPHILIZED, FOR SOLUTION INTRAVENOUS EVERY 6 HOURS
Status: COMPLETED | OUTPATIENT
Start: 2017-01-01 | End: 2017-01-01

## 2017-01-01 RX ORDER — BARIUM SULFATE 400 MG/ML
SUSPENSION ORAL ONCE
Status: COMPLETED | OUTPATIENT
Start: 2017-01-01 | End: 2017-01-01

## 2017-01-01 RX ORDER — GUAIFENESIN 600 MG/1
600 TABLET, EXTENDED RELEASE ORAL 2 TIMES DAILY
COMMUNITY
Start: 2017-01-01 | End: 2017-01-01

## 2017-01-01 RX ORDER — TAMSULOSIN HYDROCHLORIDE 0.4 MG/1
0.4 CAPSULE ORAL WEEKLY
COMMUNITY

## 2017-01-01 RX ORDER — NICOTINE 21 MG/24HR
1 PATCH, TRANSDERMAL 24 HOURS TRANSDERMAL DAILY
Status: DISCONTINUED | OUTPATIENT
Start: 2017-01-01 | End: 2017-01-01

## 2017-01-01 RX ORDER — NITROGLYCERIN 0.4 MG/1
0.4 TABLET SUBLINGUAL EVERY 5 MIN PRN
Status: DISCONTINUED | OUTPATIENT
Start: 2017-01-01 | End: 2017-01-01 | Stop reason: HOSPADM

## 2017-01-01 RX ORDER — CEFTRIAXONE 2 G/1
2 INJECTION, POWDER, FOR SOLUTION INTRAMUSCULAR; INTRAVENOUS EVERY 24 HOURS
Status: DISCONTINUED | OUTPATIENT
Start: 2017-01-01 | End: 2017-01-01

## 2017-01-01 RX ORDER — SENNOSIDES 8.6 MG
1 TABLET ORAL DAILY
COMMUNITY

## 2017-01-01 RX ORDER — METHYLPREDNISOLONE SODIUM SUCCINATE 125 MG/2ML
125 INJECTION, POWDER, LYOPHILIZED, FOR SOLUTION INTRAMUSCULAR; INTRAVENOUS EVERY 12 HOURS
Status: DISCONTINUED | OUTPATIENT
Start: 2017-01-01 | End: 2017-01-01

## 2017-01-01 RX ORDER — AZITHROMYCIN 250 MG/1
250 TABLET, FILM COATED ORAL EVERY 24 HOURS
Status: COMPLETED | OUTPATIENT
Start: 2017-01-01 | End: 2017-01-01

## 2017-01-01 RX ORDER — ATROPINE SULFATE 10 MG/ML
1-2 SOLUTION/ DROPS OPHTHALMIC
Status: DISCONTINUED | OUTPATIENT
Start: 2017-01-01 | End: 2017-01-01 | Stop reason: HOSPADM

## 2017-01-01 RX ORDER — ALBUTEROL SULFATE 5 MG/ML
2.5 SOLUTION RESPIRATORY (INHALATION)
Status: DISCONTINUED | OUTPATIENT
Start: 2017-01-01 | End: 2017-01-01 | Stop reason: HOSPADM

## 2017-01-01 RX ORDER — MORPHINE SULFATE 10 MG/5ML
5-10 SOLUTION ORAL
Status: DISCONTINUED | OUTPATIENT
Start: 2017-01-01 | End: 2017-01-01 | Stop reason: HOSPADM

## 2017-01-01 RX ORDER — IOPAMIDOL 755 MG/ML
58 INJECTION, SOLUTION INTRAVASCULAR ONCE
Status: DISCONTINUED | OUTPATIENT
Start: 2017-01-01 | End: 2017-01-01 | Stop reason: CLARIF

## 2017-01-01 RX ORDER — ACETAMINOPHEN 650 MG/1
650 SUPPOSITORY RECTAL EVERY 4 HOURS PRN
Qty: 60 SUPPOSITORY | DISCHARGE
Start: 2017-01-01

## 2017-01-01 RX ORDER — MORPHINE SULFATE 2 MG/ML
2 INJECTION, SOLUTION INTRAMUSCULAR; INTRAVENOUS ONCE
Status: COMPLETED | OUTPATIENT
Start: 2017-01-01 | End: 2017-01-01

## 2017-01-01 RX ORDER — ASPIRIN 81 MG/1
81 TABLET ORAL DAILY
Status: DISCONTINUED | OUTPATIENT
Start: 2017-01-01 | End: 2017-01-01

## 2017-01-01 RX ORDER — LANOLIN ALCOHOL/MO/W.PET/CERES
3 CREAM (GRAM) TOPICAL
DISCHARGE
Start: 2017-01-01 | End: 2017-01-01 | Stop reason: DRUGHIGH

## 2017-01-01 RX ORDER — POTASSIUM CHLORIDE 29.8 MG/ML
20 INJECTION INTRAVENOUS
Status: DISCONTINUED | OUTPATIENT
Start: 2017-01-01 | End: 2017-01-01

## 2017-01-01 RX ORDER — MIRTAZAPINE 7.5 MG/1
7.5 TABLET, FILM COATED ORAL AT BEDTIME
Status: DISCONTINUED | OUTPATIENT
Start: 2017-01-01 | End: 2017-01-01 | Stop reason: HOSPADM

## 2017-01-01 RX ORDER — NALOXONE HYDROCHLORIDE 0.4 MG/ML
.1-.4 INJECTION, SOLUTION INTRAMUSCULAR; INTRAVENOUS; SUBCUTANEOUS
Status: DISCONTINUED | OUTPATIENT
Start: 2017-01-01 | End: 2017-01-01 | Stop reason: HOSPADM

## 2017-01-01 RX ORDER — CODEINE PHOSPHATE AND GUAIFENESIN 10; 100 MG/5ML; MG/5ML
5-10 SOLUTION ORAL EVERY 4 HOURS PRN
Status: DISCONTINUED | OUTPATIENT
Start: 2017-01-01 | End: 2017-01-01

## 2017-01-01 RX ORDER — MIRTAZAPINE 7.5 MG/1
7.5 TABLET, FILM COATED ORAL AT BEDTIME
Qty: 60 TABLET | DISCHARGE
Start: 2017-01-01

## 2017-01-01 RX ORDER — FLUTICASONE PROPIONATE 50 MCG
1 SPRAY, SUSPENSION (ML) NASAL DAILY PRN
Status: DISCONTINUED | OUTPATIENT
Start: 2017-01-01 | End: 2017-01-01 | Stop reason: HOSPADM

## 2017-01-01 RX ORDER — OXYMETAZOLINE HYDROCHLORIDE 0.05 G/100ML
2 SPRAY NASAL 2 TIMES DAILY PRN
DISCHARGE
Start: 2017-01-01

## 2017-01-01 RX ORDER — POTASSIUM CL/LIDO/0.9 % NACL 10MEQ/0.1L
10 INTRAVENOUS SOLUTION, PIGGYBACK (ML) INTRAVENOUS
Status: DISCONTINUED | OUTPATIENT
Start: 2017-01-01 | End: 2017-01-01

## 2017-01-01 RX ORDER — OXYMETAZOLINE HYDROCHLORIDE 0.05 G/100ML
2 SPRAY NASAL 2 TIMES DAILY
Status: DISCONTINUED | OUTPATIENT
Start: 2017-01-01 | End: 2017-01-01 | Stop reason: HOSPADM

## 2017-01-01 RX ORDER — METOPROLOL TARTRATE 1 MG/ML
2.5 INJECTION, SOLUTION INTRAVENOUS EVERY 4 HOURS PRN
Status: DISCONTINUED | OUTPATIENT
Start: 2017-01-01 | End: 2017-01-01 | Stop reason: HOSPADM

## 2017-01-01 RX ORDER — CEFTRIAXONE 2 G/1
2 INJECTION, POWDER, FOR SOLUTION INTRAMUSCULAR; INTRAVENOUS ONCE
Status: COMPLETED | OUTPATIENT
Start: 2017-01-01 | End: 2017-01-01

## 2017-01-01 RX ORDER — BISACODYL 10 MG
10 SUPPOSITORY, RECTAL RECTAL DAILY PRN
Status: DISCONTINUED | OUTPATIENT
Start: 2017-01-01 | End: 2017-01-01 | Stop reason: HOSPADM

## 2017-01-01 RX ORDER — LIDOCAINE 40 MG/G
CREAM TOPICAL
Status: DISCONTINUED | OUTPATIENT
Start: 2017-01-01 | End: 2017-01-01 | Stop reason: HOSPADM

## 2017-01-01 RX ORDER — IPRATROPIUM BROMIDE AND ALBUTEROL SULFATE 2.5; .5 MG/3ML; MG/3ML
3 SOLUTION RESPIRATORY (INHALATION)
Status: DISCONTINUED | OUTPATIENT
Start: 2017-01-01 | End: 2017-01-01

## 2017-01-01 RX ORDER — CARVEDILOL 3.12 MG/1
3.12 TABLET ORAL 2 TIMES DAILY WITH MEALS
Status: DISCONTINUED | OUTPATIENT
Start: 2017-01-01 | End: 2017-01-01

## 2017-01-01 RX ORDER — POLYETHYLENE GLYCOL 3350 17 G/17G
17 POWDER, FOR SOLUTION ORAL DAILY PRN
Status: DISCONTINUED | OUTPATIENT
Start: 2017-01-01 | End: 2017-01-01 | Stop reason: HOSPADM

## 2017-01-01 RX ORDER — FLUTICASONE PROPIONATE 50 MCG
SPRAY, SUSPENSION (ML) NASAL
Qty: 48 ML | Refills: 0 | Status: SHIPPED | OUTPATIENT
Start: 2017-01-01 | End: 2017-01-01

## 2017-01-01 RX ORDER — IPRATROPIUM BROMIDE AND ALBUTEROL SULFATE 2.5; .5 MG/3ML; MG/3ML
3 SOLUTION RESPIRATORY (INHALATION) EVERY 4 HOURS PRN
Status: DISCONTINUED | OUTPATIENT
Start: 2017-01-01 | End: 2017-01-01

## 2017-01-01 RX ADMIN — NICOTINE POLACRILEX 2 MG: 2 GUM, CHEWING ORAL at 15:57

## 2017-01-01 RX ADMIN — ALBUTEROL SULFATE 2.5 MG: 2.5 SOLUTION RESPIRATORY (INHALATION) at 20:09

## 2017-01-01 RX ADMIN — ASPIRIN 81 MG: 81 TABLET, COATED ORAL at 09:21

## 2017-01-01 RX ADMIN — IOPAMIDOL 73 ML: 755 INJECTION, SOLUTION INTRAVENOUS at 09:47

## 2017-01-01 RX ADMIN — TIOTROPIUM BROMIDE 18 MCG: 18 CAPSULE ORAL; RESPIRATORY (INHALATION) at 12:16

## 2017-01-01 RX ADMIN — SODIUM CHLORIDE, POTASSIUM CHLORIDE, SODIUM LACTATE AND CALCIUM CHLORIDE 500 ML: 600; 310; 30; 20 INJECTION, SOLUTION INTRAVENOUS at 10:15

## 2017-01-01 RX ADMIN — Medication 10 MEQ: at 20:03

## 2017-01-01 RX ADMIN — ENOXAPARIN SODIUM 40 MG: 40 INJECTION SUBCUTANEOUS at 10:51

## 2017-01-01 RX ADMIN — ACETAMINOPHEN 650 MG: 325 TABLET, FILM COATED ORAL at 02:20

## 2017-01-01 RX ADMIN — ASPIRIN 81 MG: 81 TABLET, COATED ORAL at 08:05

## 2017-01-01 RX ADMIN — NICOTINE POLACRILEX 4 MG: 2 GUM, CHEWING ORAL at 03:15

## 2017-01-01 RX ADMIN — ENOXAPARIN SODIUM 40 MG: 40 INJECTION SUBCUTANEOUS at 09:22

## 2017-01-01 RX ADMIN — TIOTROPIUM BROMIDE 18 MCG: 18 CAPSULE ORAL; RESPIRATORY (INHALATION) at 10:54

## 2017-01-01 RX ADMIN — NICOTINE POLACRILEX 2 MG: 2 GUM, CHEWING ORAL at 08:06

## 2017-01-01 RX ADMIN — METHYLPREDNISOLONE SODIUM SUCCINATE 125 MG: 125 INJECTION, POWDER, FOR SOLUTION INTRAMUSCULAR; INTRAVENOUS at 18:08

## 2017-01-01 RX ADMIN — FUROSEMIDE 20 MG: 10 INJECTION, SOLUTION INTRAVENOUS at 09:48

## 2017-01-01 RX ADMIN — CEFTRIAXONE 2 G: 2 INJECTION, POWDER, FOR SOLUTION INTRAMUSCULAR; INTRAVENOUS at 00:45

## 2017-01-01 RX ADMIN — PIPERACILLIN SODIUM,TAZOBACTAM SODIUM 4.5 G: 4; .5 INJECTION, POWDER, FOR SOLUTION INTRAVENOUS at 13:58

## 2017-01-01 RX ADMIN — CARVEDILOL 3.12 MG: 3.12 TABLET, FILM COATED ORAL at 18:14

## 2017-01-01 RX ADMIN — OXYMETAZOLINE HYDROCHLORIDE 2 SPRAY: 5 SPRAY NASAL at 08:41

## 2017-01-01 RX ADMIN — ALBUTEROL SULFATE 2.5 MG: 2.5 SOLUTION RESPIRATORY (INHALATION) at 10:59

## 2017-01-01 RX ADMIN — TIOTROPIUM BROMIDE 18 MCG: 18 CAPSULE ORAL; RESPIRATORY (INHALATION) at 09:24

## 2017-01-01 RX ADMIN — NICOTINE POLACRILEX 2 MG: 2 GUM, CHEWING ORAL at 18:33

## 2017-01-01 RX ADMIN — ALBUTEROL SULFATE 2.5 MG: 2.5 SOLUTION RESPIRATORY (INHALATION) at 07:03

## 2017-01-01 RX ADMIN — CARVEDILOL 3.12 MG: 3.12 TABLET, FILM COATED ORAL at 07:55

## 2017-01-01 RX ADMIN — ACETAMINOPHEN 650 MG: 325 TABLET, FILM COATED ORAL at 12:36

## 2017-01-01 RX ADMIN — ALBUTEROL SULFATE 2.5 MG: 2.5 SOLUTION RESPIRATORY (INHALATION) at 16:12

## 2017-01-01 RX ADMIN — FLUTICASONE PROPIONATE 1 SPRAY: 50 SPRAY, METERED NASAL at 08:51

## 2017-01-01 RX ADMIN — NICOTINE POLACRILEX 2 MG: 2 GUM, CHEWING ORAL at 10:58

## 2017-01-01 RX ADMIN — SODIUM CHLORIDE 1000 ML: 9 INJECTION, SOLUTION INTRAVENOUS at 22:54

## 2017-01-01 RX ADMIN — PIPERACILLIN SODIUM,TAZOBACTAM SODIUM 4.5 G: 4; .5 INJECTION, POWDER, FOR SOLUTION INTRAVENOUS at 12:57

## 2017-01-01 RX ADMIN — NICOTINE 1 PATCH: 14 PATCH, EXTENDED RELEASE TRANSDERMAL at 07:55

## 2017-01-01 RX ADMIN — OXYMETAZOLINE HYDROCHLORIDE 2 SPRAY: 5 SPRAY NASAL at 20:11

## 2017-01-01 RX ADMIN — ALBUTEROL SULFATE 2.5 MG: 2.5 SOLUTION RESPIRATORY (INHALATION) at 11:26

## 2017-01-01 RX ADMIN — ATORVASTATIN CALCIUM 20 MG: 20 TABLET, FILM COATED ORAL at 08:06

## 2017-01-01 RX ADMIN — ALBUTEROL SULFATE 2.5 MG: 2.5 SOLUTION RESPIRATORY (INHALATION) at 19:07

## 2017-01-01 RX ADMIN — ASPIRIN 81 MG: 81 TABLET, COATED ORAL at 08:17

## 2017-01-01 RX ADMIN — SODIUM CHLORIDE 85 ML: 9 INJECTION, SOLUTION INTRAVENOUS at 09:47

## 2017-01-01 RX ADMIN — MORPHINE SULFATE 2 MG: 2 INJECTION, SOLUTION INTRAMUSCULAR; INTRAVENOUS at 08:13

## 2017-01-01 RX ADMIN — PIPERACILLIN SODIUM,TAZOBACTAM SODIUM 4.5 G: 4; .5 INJECTION, POWDER, FOR SOLUTION INTRAVENOUS at 00:54

## 2017-01-01 RX ADMIN — NICOTINE POLACRILEX 4 MG: 2 GUM, CHEWING ORAL at 17:47

## 2017-01-01 RX ADMIN — TIOTROPIUM BROMIDE 18 MCG: 18 CAPSULE ORAL; RESPIRATORY (INHALATION) at 09:12

## 2017-01-01 RX ADMIN — IPRATROPIUM BROMIDE AND ALBUTEROL SULFATE 3 ML: .5; 3 SOLUTION RESPIRATORY (INHALATION) at 04:13

## 2017-01-01 RX ADMIN — SODIUM CHLORIDE: 9 INJECTION, SOLUTION INTRAVENOUS at 10:00

## 2017-01-01 RX ADMIN — ALBUTEROL SULFATE 2.5 MG: 2.5 SOLUTION RESPIRATORY (INHALATION) at 06:58

## 2017-01-01 RX ADMIN — GUAIFENESIN 10 ML: 100 SOLUTION ORAL at 09:23

## 2017-01-01 RX ADMIN — ACETAMINOPHEN 650 MG: 325 TABLET, FILM COATED ORAL at 07:58

## 2017-01-01 RX ADMIN — ALBUTEROL SULFATE 2.5 MG: 2.5 SOLUTION RESPIRATORY (INHALATION) at 11:28

## 2017-01-01 RX ADMIN — ALBUTEROL SULFATE 2.5 MG: 2.5 SOLUTION RESPIRATORY (INHALATION) at 10:25

## 2017-01-01 RX ADMIN — OXYMETAZOLINE HYDROCHLORIDE 2 SPRAY: 5 SPRAY NASAL at 08:56

## 2017-01-01 RX ADMIN — TAMSULOSIN HYDROCHLORIDE 0.4 MG: 0.4 CAPSULE ORAL at 12:53

## 2017-01-01 RX ADMIN — NICOTINE POLACRILEX 2 MG: 2 GUM, CHEWING ORAL at 13:06

## 2017-01-01 RX ADMIN — OXYMETAZOLINE HYDROCHLORIDE 2 SPRAY: 5 SPRAY NASAL at 23:40

## 2017-01-01 RX ADMIN — CEFTRIAXONE 2 G: 2 INJECTION, POWDER, FOR SOLUTION INTRAMUSCULAR; INTRAVENOUS at 00:35

## 2017-01-01 RX ADMIN — TAMSULOSIN HYDROCHLORIDE 0.4 MG: 0.4 CAPSULE ORAL at 02:39

## 2017-01-01 RX ADMIN — ALBUTEROL SULFATE 2.5 MG: 2.5 SOLUTION RESPIRATORY (INHALATION) at 16:02

## 2017-01-01 RX ADMIN — ALBUTEROL SULFATE 2.5 MG: 2.5 SOLUTION RESPIRATORY (INHALATION) at 23:51

## 2017-01-01 RX ADMIN — METHYLPREDNISOLONE SODIUM SUCCINATE 125 MG: 125 INJECTION, POWDER, FOR SOLUTION INTRAMUSCULAR; INTRAVENOUS at 04:45

## 2017-01-01 RX ADMIN — FLUTICASONE FUROATE 1 PUFF: 100 POWDER RESPIRATORY (INHALATION) at 09:23

## 2017-01-01 RX ADMIN — METHYLPREDNISOLONE SODIUM SUCCINATE 125 MG: 125 INJECTION, POWDER, FOR SOLUTION INTRAMUSCULAR; INTRAVENOUS at 16:01

## 2017-01-01 RX ADMIN — ENOXAPARIN SODIUM 30 MG: 30 INJECTION SUBCUTANEOUS at 08:17

## 2017-01-01 RX ADMIN — PIPERACILLIN SODIUM,TAZOBACTAM SODIUM 4.5 G: 4; .5 INJECTION, POWDER, FOR SOLUTION INTRAVENOUS at 07:54

## 2017-01-01 RX ADMIN — CARVEDILOL 3.12 MG: 3.12 TABLET, FILM COATED ORAL at 08:17

## 2017-01-01 RX ADMIN — NICOTINE POLACRILEX 4 MG: 2 GUM, CHEWING ORAL at 01:00

## 2017-01-01 RX ADMIN — PIPERACILLIN SODIUM,TAZOBACTAM SODIUM 4.5 G: 4; .5 INJECTION, POWDER, FOR SOLUTION INTRAVENOUS at 05:55

## 2017-01-01 RX ADMIN — ASPIRIN 81 MG: 81 TABLET, COATED ORAL at 07:55

## 2017-01-01 RX ADMIN — GUAIFENESIN 10 ML: 100 SOLUTION ORAL at 21:31

## 2017-01-01 RX ADMIN — NICOTINE POLACRILEX 2 MG: 2 GUM, CHEWING ORAL at 06:16

## 2017-01-01 RX ADMIN — PIPERACILLIN SODIUM,TAZOBACTAM SODIUM 4.5 G: 4; .5 INJECTION, POWDER, FOR SOLUTION INTRAVENOUS at 16:13

## 2017-01-01 RX ADMIN — FLUTICASONE FUROATE 1 PUFF: 100 POWDER RESPIRATORY (INHALATION) at 08:17

## 2017-01-01 RX ADMIN — NICOTINE POLACRILEX 2 MG: 2 GUM, CHEWING ORAL at 09:50

## 2017-01-01 RX ADMIN — IPRATROPIUM BROMIDE AND ALBUTEROL SULFATE 3 ML: .5; 3 SOLUTION RESPIRATORY (INHALATION) at 23:07

## 2017-01-01 RX ADMIN — ALBUTEROL SULFATE 2.5 MG: 2.5 SOLUTION RESPIRATORY (INHALATION) at 19:15

## 2017-01-01 RX ADMIN — METHYLPREDNISOLONE SODIUM SUCCINATE 125 MG: 125 INJECTION, POWDER, FOR SOLUTION INTRAMUSCULAR; INTRAVENOUS at 16:13

## 2017-01-01 RX ADMIN — PIPERACILLIN SODIUM,TAZOBACTAM SODIUM 4.5 G: 4; .5 INJECTION, POWDER, FOR SOLUTION INTRAVENOUS at 06:32

## 2017-01-01 RX ADMIN — SODIUM CHLORIDE: 9 INJECTION, SOLUTION INTRAVENOUS at 19:39

## 2017-01-01 RX ADMIN — SODIUM CHLORIDE: 9 INJECTION, SOLUTION INTRAVENOUS at 22:18

## 2017-01-01 RX ADMIN — AZITHROMYCIN 250 MG: 250 TABLET, FILM COATED ORAL at 08:05

## 2017-01-01 RX ADMIN — ALBUTEROL SULFATE 2.5 MG: 2.5 SOLUTION RESPIRATORY (INHALATION) at 12:15

## 2017-01-01 RX ADMIN — IPRATROPIUM BROMIDE AND ALBUTEROL SULFATE 3 ML: .5; 3 SOLUTION RESPIRATORY (INHALATION) at 08:03

## 2017-01-01 RX ADMIN — ALBUTEROL SULFATE 2.5 MG: 2.5 SOLUTION RESPIRATORY (INHALATION) at 07:27

## 2017-01-01 RX ADMIN — CARVEDILOL 3.12 MG: 3.12 TABLET, FILM COATED ORAL at 09:10

## 2017-01-01 RX ADMIN — FLUTICASONE PROPIONATE 1 SPRAY: 50 SPRAY, METERED NASAL at 19:40

## 2017-01-01 RX ADMIN — SALINE NASAL SPRAY 2 SPRAY: 1.5 SOLUTION NASAL at 22:19

## 2017-01-01 RX ADMIN — OXYMETAZOLINE HYDROCHLORIDE 2 SPRAY: 5 SPRAY NASAL at 09:25

## 2017-01-01 RX ADMIN — NICOTINE POLACRILEX 2 MG: 2 GUM, CHEWING ORAL at 12:51

## 2017-01-01 RX ADMIN — SULFUR HEXAFLUORIDE 3 ML: KIT at 10:45

## 2017-01-01 RX ADMIN — ALBUTEROL SULFATE 2.5 MG: 2.5 SOLUTION RESPIRATORY (INHALATION) at 15:46

## 2017-01-01 RX ADMIN — CARVEDILOL 3.12 MG: 3.12 TABLET, FILM COATED ORAL at 08:06

## 2017-01-01 RX ADMIN — SALINE NASAL SPRAY 2 SPRAY: 1.5 SOLUTION NASAL at 00:27

## 2017-01-01 RX ADMIN — FLUTICASONE FUROATE 1 PUFF: 100 POWDER RESPIRATORY (INHALATION) at 09:12

## 2017-01-01 RX ADMIN — PIPERACILLIN SODIUM,TAZOBACTAM SODIUM 4.5 G: 4; .5 INJECTION, POWDER, FOR SOLUTION INTRAVENOUS at 18:26

## 2017-01-01 RX ADMIN — FLUTICASONE FUROATE 1 PUFF: 100 POWDER RESPIRATORY (INHALATION) at 08:49

## 2017-01-01 RX ADMIN — METHYLPREDNISOLONE SODIUM SUCCINATE 125 MG: 125 INJECTION, POWDER, FOR SOLUTION INTRAMUSCULAR; INTRAVENOUS at 17:05

## 2017-01-01 RX ADMIN — ALBUTEROL SULFATE 2.5 MG: 2.5 SOLUTION RESPIRATORY (INHALATION) at 19:43

## 2017-01-01 RX ADMIN — ALBUTEROL SULFATE 2.5 MG: 2.5 SOLUTION RESPIRATORY (INHALATION) at 15:11

## 2017-01-01 RX ADMIN — ALBUTEROL SULFATE 2.5 MG: 2.5 SOLUTION RESPIRATORY (INHALATION) at 08:17

## 2017-01-01 RX ADMIN — PIPERACILLIN SODIUM,TAZOBACTAM SODIUM 4.5 G: 4; .5 INJECTION, POWDER, FOR SOLUTION INTRAVENOUS at 11:19

## 2017-01-01 RX ADMIN — CEFTRIAXONE 2 G: 2 INJECTION, POWDER, FOR SOLUTION INTRAMUSCULAR; INTRAVENOUS at 23:08

## 2017-01-01 RX ADMIN — PIPERACILLIN SODIUM,TAZOBACTAM SODIUM 4.5 G: 4; .5 INJECTION, POWDER, FOR SOLUTION INTRAVENOUS at 00:23

## 2017-01-01 RX ADMIN — ALBUTEROL SULFATE 2.5 MG: 2.5 SOLUTION RESPIRATORY (INHALATION) at 19:14

## 2017-01-01 RX ADMIN — NICOTINE POLACRILEX 2 MG: 2 GUM, CHEWING ORAL at 19:35

## 2017-01-01 RX ADMIN — Medication 1 SPRAY: at 20:54

## 2017-01-01 RX ADMIN — FLUTICASONE PROPIONATE 1 SPRAY: 50 SPRAY, METERED NASAL at 18:25

## 2017-01-01 RX ADMIN — NICOTINE POLACRILEX 2 MG: 2 GUM, CHEWING ORAL at 23:08

## 2017-01-01 RX ADMIN — PIPERACILLIN SODIUM,TAZOBACTAM SODIUM 4.5 G: 4; .5 INJECTION, POWDER, FOR SOLUTION INTRAVENOUS at 18:16

## 2017-01-01 RX ADMIN — ACETAMINOPHEN 650 MG: 325 TABLET, FILM COATED ORAL at 09:23

## 2017-01-01 RX ADMIN — NICOTINE POLACRILEX 2 MG: 2 GUM, CHEWING ORAL at 00:48

## 2017-01-01 RX ADMIN — NICOTINE POLACRILEX 2 MG: 2 GUM, CHEWING ORAL at 19:55

## 2017-01-01 RX ADMIN — FLUTICASONE FUROATE 1 PUFF: 100 POWDER RESPIRATORY (INHALATION) at 08:30

## 2017-01-01 RX ADMIN — ATORVASTATIN CALCIUM 20 MG: 20 TABLET, FILM COATED ORAL at 17:57

## 2017-01-01 RX ADMIN — FLUTICASONE FUROATE 1 PUFF: 100 POWDER RESPIRATORY (INHALATION) at 08:09

## 2017-01-01 RX ADMIN — ATORVASTATIN CALCIUM 20 MG: 20 TABLET, FILM COATED ORAL at 09:07

## 2017-01-01 RX ADMIN — ALBUTEROL SULFATE 2.5 MG: 2.5 SOLUTION RESPIRATORY (INHALATION) at 11:44

## 2017-01-01 RX ADMIN — AZITHROMYCIN 250 MG: 250 TABLET, FILM COATED ORAL at 09:10

## 2017-01-01 RX ADMIN — NICOTINE POLACRILEX 2 MG: 2 GUM, CHEWING ORAL at 14:59

## 2017-01-01 RX ADMIN — SODIUM CHLORIDE 500 ML: 9 INJECTION, SOLUTION INTRAVENOUS at 11:26

## 2017-01-01 RX ADMIN — MORPHINE SULFATE 10 MG: 100 SOLUTION ORAL at 21:46

## 2017-01-01 RX ADMIN — TIOTROPIUM BROMIDE 18 MCG: 18 CAPSULE ORAL; RESPIRATORY (INHALATION) at 09:35

## 2017-01-01 RX ADMIN — FLUTICASONE FUROATE 1 PUFF: 100 POWDER RESPIRATORY (INHALATION) at 08:04

## 2017-01-01 RX ADMIN — ENOXAPARIN SODIUM 40 MG: 40 INJECTION SUBCUTANEOUS at 09:12

## 2017-01-01 RX ADMIN — SALINE NASAL SPRAY 2 SPRAY: 1.5 SOLUTION NASAL at 20:00

## 2017-01-01 RX ADMIN — TIOTROPIUM BROMIDE 18 MCG: 18 CAPSULE ORAL; RESPIRATORY (INHALATION) at 08:02

## 2017-01-01 RX ADMIN — ATORVASTATIN CALCIUM 20 MG: 20 TABLET, FILM COATED ORAL at 07:55

## 2017-01-01 RX ADMIN — NICOTINE POLACRILEX 2 MG: 2 GUM, CHEWING ORAL at 12:46

## 2017-01-01 RX ADMIN — TIOTROPIUM BROMIDE 18 MCG: 18 CAPSULE ORAL; RESPIRATORY (INHALATION) at 08:17

## 2017-01-01 RX ADMIN — ALBUTEROL SULFATE 2.5 MG: 2.5 SOLUTION RESPIRATORY (INHALATION) at 23:36

## 2017-01-01 RX ADMIN — PIPERACILLIN SODIUM,TAZOBACTAM SODIUM 4.5 G: 4; .5 INJECTION, POWDER, FOR SOLUTION INTRAVENOUS at 12:26

## 2017-01-01 RX ADMIN — ENOXAPARIN SODIUM 40 MG: 40 INJECTION SUBCUTANEOUS at 08:18

## 2017-01-01 RX ADMIN — SALINE NASAL SPRAY 2 SPRAY: 1.5 SOLUTION NASAL at 17:36

## 2017-01-01 RX ADMIN — PSEUDOEPHEDRINE HCL 30 MG: 30 TABLET, FILM COATED ORAL at 22:12

## 2017-01-01 RX ADMIN — NICOTINE 1 PATCH: 14 PATCH, EXTENDED RELEASE TRANSDERMAL at 13:06

## 2017-01-01 RX ADMIN — CEFTRIAXONE 2 G: 2 INJECTION, POWDER, FOR SOLUTION INTRAMUSCULAR; INTRAVENOUS at 23:38

## 2017-01-01 RX ADMIN — GUAIFENESIN 10 ML: 100 SOLUTION ORAL at 19:37

## 2017-01-01 RX ADMIN — CARVEDILOL 3.12 MG: 3.12 TABLET, FILM COATED ORAL at 11:40

## 2017-01-01 RX ADMIN — NICOTINE POLACRILEX 2 MG: 2 GUM, CHEWING ORAL at 12:00

## 2017-01-01 RX ADMIN — PIPERACILLIN SODIUM,TAZOBACTAM SODIUM 4.5 G: 4; .5 INJECTION, POWDER, FOR SOLUTION INTRAVENOUS at 17:52

## 2017-01-01 RX ADMIN — NICOTINE 1 PATCH: 14 PATCH, EXTENDED RELEASE TRANSDERMAL at 08:17

## 2017-01-01 RX ADMIN — ALBUTEROL SULFATE 2.5 MG: 2.5 SOLUTION RESPIRATORY (INHALATION) at 15:24

## 2017-01-01 RX ADMIN — METHYLPREDNISOLONE SODIUM SUCCINATE 125 MG: 125 INJECTION, POWDER, FOR SOLUTION INTRAMUSCULAR; INTRAVENOUS at 04:42

## 2017-01-01 RX ADMIN — TIOTROPIUM BROMIDE 18 MCG: 18 CAPSULE ORAL; RESPIRATORY (INHALATION) at 08:13

## 2017-01-01 RX ADMIN — CARVEDILOL 3.12 MG: 3.12 TABLET, FILM COATED ORAL at 18:26

## 2017-01-01 RX ADMIN — ALBUTEROL SULFATE 2.5 MG: 2.5 SOLUTION RESPIRATORY (INHALATION) at 11:37

## 2017-01-01 RX ADMIN — NICOTINE POLACRILEX 4 MG: 2 GUM, CHEWING ORAL at 18:45

## 2017-01-01 RX ADMIN — ENOXAPARIN SODIUM 40 MG: 40 INJECTION SUBCUTANEOUS at 08:05

## 2017-01-01 RX ADMIN — GUAIFENESIN 10 ML: 100 SOLUTION ORAL at 12:05

## 2017-01-01 RX ADMIN — ALBUTEROL SULFATE 2.5 MG: 2.5 SOLUTION RESPIRATORY (INHALATION) at 18:50

## 2017-01-01 RX ADMIN — FUROSEMIDE 40 MG: 10 INJECTION, SOLUTION INTRAVENOUS at 01:57

## 2017-01-01 RX ADMIN — ALBUTEROL SULFATE 2.5 MG: 2.5 SOLUTION RESPIRATORY (INHALATION) at 07:38

## 2017-01-01 RX ADMIN — Medication 10 MEQ: at 19:12

## 2017-01-01 RX ADMIN — NICOTINE POLACRILEX 2 MG: 2 GUM, CHEWING ORAL at 13:12

## 2017-01-01 RX ADMIN — FLUTICASONE FUROATE 1 PUFF: 100 POWDER RESPIRATORY (INHALATION) at 10:52

## 2017-01-01 RX ADMIN — NICOTINE POLACRILEX 2 MG: 2 GUM, CHEWING ORAL at 09:30

## 2017-01-01 RX ADMIN — MIRTAZAPINE 7.5 MG: 7.5 TABLET, FILM COATED ORAL at 21:26

## 2017-01-01 RX ADMIN — MORPHINE SULFATE 5 MG: 10 SOLUTION ORAL at 12:05

## 2017-01-01 RX ADMIN — ALBUTEROL SULFATE 2.5 MG: 2.5 SOLUTION RESPIRATORY (INHALATION) at 19:11

## 2017-01-01 RX ADMIN — PIPERACILLIN SODIUM,TAZOBACTAM SODIUM 4.5 G: 4; .5 INJECTION, POWDER, FOR SOLUTION INTRAVENOUS at 21:13

## 2017-01-01 RX ADMIN — FLUTICASONE FUROATE 1 PUFF: 100 POWDER RESPIRATORY (INHALATION) at 09:35

## 2017-01-01 RX ADMIN — NICOTINE POLACRILEX 2 MG: 2 GUM, CHEWING ORAL at 16:45

## 2017-01-01 RX ADMIN — FLUTICASONE FUROATE 1 PUFF: 100 POWDER RESPIRATORY (INHALATION) at 08:55

## 2017-01-01 RX ADMIN — TIOTROPIUM BROMIDE 18 MCG: 18 CAPSULE ORAL; RESPIRATORY (INHALATION) at 08:09

## 2017-01-01 RX ADMIN — PIPERACILLIN SODIUM,TAZOBACTAM SODIUM 4.5 G: 4; .5 INJECTION, POWDER, FOR SOLUTION INTRAVENOUS at 12:16

## 2017-01-01 RX ADMIN — ALBUTEROL SULFATE 2.5 MG: 2.5 SOLUTION RESPIRATORY (INHALATION) at 19:04

## 2017-01-01 RX ADMIN — BARIUM SULFATE 5 ML: 400 SUSPENSION ORAL at 10:29

## 2017-01-01 RX ADMIN — ASPIRIN 81 MG: 81 TABLET, COATED ORAL at 09:11

## 2017-01-01 RX ADMIN — PIPERACILLIN SODIUM,TAZOBACTAM SODIUM 4.5 G: 4; .5 INJECTION, POWDER, FOR SOLUTION INTRAVENOUS at 01:50

## 2017-01-01 RX ADMIN — SODIUM CHLORIDE 500 ML: 9 INJECTION, SOLUTION INTRAVENOUS at 08:59

## 2017-01-01 RX ADMIN — NICOTINE POLACRILEX 2 MG: 2 GUM, CHEWING ORAL at 10:02

## 2017-01-01 RX ADMIN — NICOTINE POLACRILEX 2 MG: 2 GUM, CHEWING ORAL at 02:32

## 2017-01-01 RX ADMIN — TIOTROPIUM BROMIDE 18 MCG: 18 CAPSULE ORAL; RESPIRATORY (INHALATION) at 09:22

## 2017-01-01 RX ADMIN — PSEUDOEPHEDRINE HCL 30 MG: 30 TABLET, FILM COATED ORAL at 18:24

## 2017-01-01 RX ADMIN — SALINE NASAL SPRAY 2 SPRAY: 1.5 SOLUTION NASAL at 22:53

## 2017-01-01 RX ADMIN — SALINE NASAL SPRAY 2 SPRAY: 1.5 SOLUTION NASAL at 14:25

## 2017-01-01 RX ADMIN — ALBUTEROL SULFATE 2.5 MG: 2.5 SOLUTION RESPIRATORY (INHALATION) at 00:04

## 2017-01-01 RX ADMIN — ALBUTEROL SULFATE 2.5 MG: 2.5 SOLUTION RESPIRATORY (INHALATION) at 15:27

## 2017-01-01 RX ADMIN — TIOTROPIUM BROMIDE 18 MCG: 18 CAPSULE ORAL; RESPIRATORY (INHALATION) at 08:51

## 2017-01-01 RX ADMIN — NICOTINE POLACRILEX 4 MG: 2 GUM, CHEWING ORAL at 03:45

## 2017-01-01 RX ADMIN — PIPERACILLIN SODIUM,TAZOBACTAM SODIUM 4.5 G: 4; .5 INJECTION, POWDER, FOR SOLUTION INTRAVENOUS at 23:34

## 2017-01-01 RX ADMIN — ATORVASTATIN CALCIUM 20 MG: 20 TABLET, FILM COATED ORAL at 08:17

## 2017-01-01 RX ADMIN — GUAIFENESIN 10 ML: 100 SOLUTION ORAL at 12:36

## 2017-01-01 RX ADMIN — ACETAMINOPHEN 650 MG: 325 TABLET, FILM COATED ORAL at 01:03

## 2017-01-01 RX ADMIN — ATORVASTATIN CALCIUM 20 MG: 20 TABLET, FILM COATED ORAL at 09:12

## 2017-01-01 RX ADMIN — PIPERACILLIN SODIUM,TAZOBACTAM SODIUM 4.5 G: 4; .5 INJECTION, POWDER, FOR SOLUTION INTRAVENOUS at 06:28

## 2017-01-01 RX ADMIN — CEFTRIAXONE 2 G: 2 INJECTION, POWDER, FOR SOLUTION INTRAMUSCULAR; INTRAVENOUS at 23:10

## 2017-01-01 RX ADMIN — FLUTICASONE PROPIONATE 1 SPRAY: 50 SPRAY, METERED NASAL at 08:55

## 2017-01-01 RX ADMIN — ASPIRIN 81 MG: 81 TABLET, COATED ORAL at 09:12

## 2017-01-01 RX ADMIN — Medication 10 MEQ: at 18:05

## 2017-01-01 RX ADMIN — ALBUTEROL SULFATE 2.5 MG: 2.5 SOLUTION RESPIRATORY (INHALATION) at 20:22

## 2017-01-01 RX ADMIN — SODIUM CHLORIDE: 9 INJECTION, SOLUTION INTRAVENOUS at 02:28

## 2017-01-01 RX ADMIN — FLUTICASONE FUROATE 1 PUFF: 100 POWDER RESPIRATORY (INHALATION) at 09:25

## 2017-01-01 RX ADMIN — AZITHROMYCIN 250 MG: 250 TABLET, FILM COATED ORAL at 10:51

## 2017-01-01 RX ADMIN — ALBUTEROL SULFATE 2.5 MG: 2.5 SOLUTION RESPIRATORY (INHALATION) at 23:35

## 2017-01-01 RX ADMIN — FLUTICASONE PROPIONATE 1 SPRAY: 50 SPRAY, METERED NASAL at 10:51

## 2017-01-01 RX ADMIN — NICOTINE POLACRILEX 2 MG: 2 GUM, CHEWING ORAL at 08:16

## 2017-01-01 RX ADMIN — SODIUM CHLORIDE 1000 ML: 9 INJECTION, SOLUTION INTRAVENOUS at 22:03

## 2017-01-01 RX ADMIN — ALBUTEROL SULFATE 2.5 MG: 2.5 SOLUTION RESPIRATORY (INHALATION) at 07:10

## 2017-01-01 RX ADMIN — AZITHROMYCIN 250 MG: 250 TABLET, FILM COATED ORAL at 09:21

## 2017-01-01 RX ADMIN — METHYLPREDNISOLONE SODIUM SUCCINATE 125 MG: 125 INJECTION, POWDER, FOR SOLUTION INTRAMUSCULAR; INTRAVENOUS at 04:03

## 2017-01-01 RX ADMIN — AZITHROMYCIN MONOHYDRATE 500 MG: 500 INJECTION, POWDER, LYOPHILIZED, FOR SOLUTION INTRAVENOUS at 23:46

## 2017-01-01 RX ADMIN — ALBUTEROL SULFATE 2.5 MG: 2.5 SOLUTION RESPIRATORY (INHALATION) at 07:13

## 2017-01-01 RX ADMIN — ASPIRIN 81 MG: 81 TABLET, COATED ORAL at 17:57

## 2017-01-01 RX ADMIN — NICOTINE POLACRILEX 2 MG: 2 GUM, CHEWING ORAL at 05:47

## 2017-01-01 RX ADMIN — ATORVASTATIN CALCIUM 20 MG: 20 TABLET, FILM COATED ORAL at 09:21

## 2017-01-01 RX ADMIN — METHYLPREDNISOLONE SODIUM SUCCINATE 125 MG: 125 INJECTION, POWDER, FOR SOLUTION INTRAMUSCULAR; INTRAVENOUS at 04:25

## 2017-01-01 RX ADMIN — Medication 10 MEQ: at 21:41

## 2017-01-01 RX ADMIN — MORPHINE SULFATE 5 MG: 100 SOLUTION ORAL at 01:26

## 2017-01-01 RX ADMIN — OXYMETAZOLINE HYDROCHLORIDE 2 SPRAY: 5 SPRAY NASAL at 20:00

## 2017-01-01 RX ADMIN — SALINE NASAL SPRAY 1 SPRAY: 1.5 SOLUTION NASAL at 21:50

## 2017-01-01 RX ADMIN — FLUTICASONE FUROATE 1 PUFF: 100 POWDER RESPIRATORY (INHALATION) at 09:24

## 2017-01-01 ASSESSMENT — ACTIVITIES OF DAILY LIVING (ADL)
DRESS: 0-->INDEPENDENT
PREVIOUS_RESPONSIBILITIES: MEAL PREP;HOUSEKEEPING;LAUNDRY;SHOPPING;YARDWORK;MEDICATION MANAGEMENT;FINANCES;DRIVING
AMBULATION: 1-->ASSISTIVE EQUIPMENT
TRANSFERRING: 1-->ASSISTIVE EQUIPMENT
COGNITION: 0 - NO COGNITION ISSUES REPORTED
WHICH_OF_THE_ABOVE_FUNCTIONAL_RISKS_HAD_A_RECENT_ONSET_OR_CHANGE?: AMBULATION
BATHING: 0-->INDEPENDENT
RETIRED_EATING: 0-->INDEPENDENT
RETIRED_COMMUNICATION: 0-->UNDERSTANDS/COMMUNICATES WITHOUT DIFFICULTY
SWALLOWING: 2-->DIFFICULTY SWALLOWING FOODS
FALL_HISTORY_WITHIN_LAST_SIX_MONTHS: NO
TOILETING: 0-->INDEPENDENT

## 2017-01-01 ASSESSMENT — PAIN DESCRIPTION - DESCRIPTORS
DESCRIPTORS: ACHING

## 2017-05-30 NOTE — MR AVS SNAPSHOT
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        Fatigue, unspecified type           Follow-ups after your visit        Who to contact     If you have questions or need follow up information about today's clinic visit or your schedule please contact [facility] directly at [phone].  Normal or non-critical lab and imaging results will be communicated to you by DogVacayhart, letter or phone within 4 business days after the clinic has received the results. If you do not hear from us within 7 days, please contact the clinic through Glycobiat or phone. If you have a critical or abnormal lab result, we will notify you by phone as soon as possible.  Submit refill requests through Playn[employer] or call your pharmacy and they will forward the refill request to us. Please allow 3 business days for your refill to be completed.          Additional Information About Your Visit        MyChart Information     [employer] gives you secure access to your electronic health record. If you see a primary care provider, you can also send messages to your care team and make appointments. If you have questions, please call your primary care clinic.  If you do not have a primary care provider, please call [phone] and they will assist you.        Care EveryWhere ID     This is your Care EveryWhere ID. This could be used by other organizations to access your [place] medical records  [id number]        Your Vitals Were     Pulse Respirations Height Pulse Oximetry BMI (Body Mass Index)       90 16 1.956 m (6' 5\") " 92% 17.1 kg/m2        Blood Pressure from Last 3 Encounters:   05/30/17 118/68   08/16/16 104/80   07/07/16 (!) 84/58    Weight from Last 3 Encounters:   05/30/17 65.4 kg (144 lb 3.2 oz)   08/16/16 61.2 kg (135 lb)   07/07/16 59.9 kg (132 lb)              We Performed the Following     CBC with Diff/Plt ([facility])     Comp. Metabolic Panel (14) ([facility])     Thyroxine (T4) Free  Direct  S ([facility])     TSH ([facility])          Today's Medication Changes          These changes are accurate as of: 5/30/17  4:09 PM.  If you have any questions, ask your nurse or doctor.               Stop taking these medicines if you haven't already. Please contact your care team if you have questions.     SEA SOFT NASAL MIST 0.65 % nasal spray   Generic drug:  sodium chloride   Stopped by:  Chaparro Carroll MD                    Primary Care Provider Office Phone # Fax #    Chaparro Carroll [phone]       [facility] [address] [facility] [address]        Thank you!     Thank you for choosing [facility]  for your care. Our goal is always to provide you with excellent care. Hearing back from our patients is one way we can continue to improve our services. Please take a few minutes to complete the written survey that you may receive in the mail after your visit with us. Thank you!             Your Updated Medication List - Protect others around you: Learn how to safely use, store and throw away your medicines at www.disposemymeds.org.          This list is accurate as of: 5/30/17  4:09 PM.  Always use your most recent med list.                   Brand Name Dispense Instructions for use    BREATHE RIGHT ADVANCED Strp      daily       fluticasone 50 MCG/ACT spray    FLONASE    48 mL    SPRAY 1 TO 2 SPRAYS INTO BOTH NOSTRILS DAILY       OTHER MEDICAL SUPPLIES          PULMICORT FLEXHALER 180 MCG/ACT inhaler   Generic drug:  budesonide     3 Inhaler    TAKE 2 PUFF BY MOUTH TWICE DAILY        SPIRIVA HANDIHALER 18 MCG capsule   Generic drug:  tiotropium     90 capsule    INHALE 1 CAPSULE BY MOUTH INTO THE LUNGS DAILY       tamsulosin 0.4 MG capsule    FLOMAX    90 capsule    TAKE 1 CAPSULE BY MOUTH DAILY       WAL-PHED 30 MG tablet   Generic drug:  pseudoePHEDrine     144 tablet    TAKE 2 TABLETS BY MOUTH EVERY 6 HOURS AS NEEDED FOR CONGESTION

## 2017-05-30 NOTE — PROGRESS NOTES
Problem(s) Oriented visit        SUBJECTIVE:                                                    Bong Noguera is a 82 year old male who presents to clinic today for the following health issues :    1. Cachectic (H)  Has managed to put a little bit of weight on  - TSH ([facility])  - Thyroxine (T4) Free  Direct  S ([facility])    2. Chronic obstructive pulmonary disease, unspecified COPD type (H)  COPD remains stable.  Has not had any recent breathing troubles beyond usual baseline.  Has not any acute respiratory events.  Remains with intermittent cough, mild shortness of breath with overexertion as per usual.  Using medication as directed with reported side effects.      3. 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  noted    4. Fatigue, unspecified type  Reports nonspecific fatigue of a generalized nature for the last few months.  Denies intestinal symptoms (i.e. No diarrhea, no constipation, no irregular BMs), Denies chest pain, shortness of breath, or dyspnea on exertion.  Denies fevers, unintended weight loss, unexplained abdominal pain, unexplained joint or muscle pain,  recent travels, or  history of autoimmune disease.   The patient does not feel that they are aware of any specific cause for this fatigue.     - Comp. Metabolic Panel (14) ([facility])  - CBC with Diff/Plt ([facility])         Problem list, Medication list, Allergies, and Medical/Social/Surgical histories reviewed in [facility] and updated as appropriate.   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     Cachectic (H)     No past surgical history on file.    Social History   Substance Use Topics     Smoking status: Current Some Day Smoker      "Packs/day: 0.10     Types: Cigars     Last attempt to quit: 1/1/2007     Smokeless tobacco: Never Used     Alcohol use 0.5 oz/week     1 Cans of beer per week     No family history on file.        OBJECTIVE:                                                    /68  Pulse 90  Resp 16  Ht 1.956 m (6' 5\")  Wt 65.4 kg (144 lb 3.2 oz)  SpO2 92%  BMI 17.1 kg/m2  Body mass index is 17.1 kg/(m^2).   APPEARANCE: = Relaxed and in no distress  Conj/Eyelids = noninjected and lids and lashes are without inflammation  PERRLA/Irises = Pupils Round Reactive to Light and Irisis without inflammation  Ears/Nose = External structures and Nares have usual shape and form  Ear canals and TM's = Canals are not inflammed and have none or little wax and the drums are not injected and have a light reflex   Lips/Teeth/Gums = No lesions seen, good dentition, and gums seem healthy  Oropharynx = No leukoplakia, No injection to the tissues, Normal Uvula  Neck = No anterior or posterior adenopathy appreciated.  Resp effort = Calm regular breathing  Breath Sounds = Good air movement with no rales or rhonchi in any lung fields  Mood/Affect = Cooperative and interested     ASSESSMENT/PLAN:                                                        Bong was seen today for fatigue.    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    Fatigue, unspecified type  -     Comp. Metabolic Panel (14) ([facility])  -     CBC with Diff/Plt ([facility])        Work on weight gain    The following health maintenance items are reviewed in Epic and correct as of today:  Health Maintenance   Topic Date Due     MEDICARE ANNUAL WELLNESS VISIT  01/13/1953     TETANUS IMMUNIZATION (SYSTEM ASSIGNED)  01/13/1953     ADVANCE DIRECTIVE PLANNING Q5 YRS  " 01/13/1953     COPD ACTION PLAN Q1 YR  02/06/2015     FALL RISK ASSESSMENT  06/30/2017     PNEUMOCOCCAL (2 of 2 - PPSV23) 06/30/2017     INFLUENZA VACCINE (SYSTEM ASSIGNED)  09/01/2017     SPIROMETRY ONETIME  Completed       Chaparro Carroll MD  [facility]  [phone]    For any issues my office # is [phone]

## 2017-06-01 NOTE — PROGRESS NOTES
Dear Bong,   I am writing to report that your included test results are within expected ranges. I do not suggest that we make any changes at this time.    Chaparro Carroll M.D.

## 2017-07-20 NOTE — PROGRESS NOTES
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    Smoker ex  ETOH 1/2 beer  Caffeine coffee lots    Sleeping not good 1.5 hr at a time, sometimes 4-5 of these. Has sleep device.  Sinus cones. Breath right strips.  Appetite poor. Today donut.  Exercise occasional walking      Hernia  Had it for a long time  Right groin  Had one on the left side 1999        Duration: years    Description (location/character/radiation): right inquinal    Intensity:  moderate    Accompanying signs and symptoms: as above    History (similar episodes/previous evaluation): per patient present for a long time    Precipitating or alleviating factors: None    Therapies tried and outcome: None     Urine  today bloody per his report  BM q3-4 days    COPD Follow-Up- fair today    Symptoms are currently: stable    Current fatigue or dyspnea with ambulation: none    Shortness of breath: stable    Increased or change in Cough/Sputum: No    Fever(s): No  Any ER/UC or hospital admissions since your last visit? No     History   Smoking Status     Current Some Day Smoker     Packs/day: 0.10     Types: Cigars     Last attempt to quit: 1/1/2007   Smokeless Tobacco     Never Used     No results found for: FEV1, YUS3THV        Problem list and histories reviewed & adjusted, as indicated.  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     Cachectic (H)     No past surgical history on file.    Social History   Substance Use Topics     Smoking status: Current Some Day Smoker     Packs/day: 0.10     Types: Cigars     Last attempt to quit: 1/1/2007     Smokeless tobacco: Never Used      "Alcohol use 0.5 oz/week     1 Cans of beer per week     No family history on file.      Current Outpatient Prescriptions   Medication Sig Dispense Refill     fluticasone (FLONASE) 50 MCG/ACT nasal spray SPRAY 1 TO 2 SPRAYS INTO BOTH NOSTRILS DAILY 48 mL 0     tamsulosin (FLOMAX) 0.4 MG 24 hr capsule TAKE 1 CAPSULE BY MOUTH DAILY 90 capsule 3     Nasal Dilators (BREATHE RIGHT ADVANCED) STRP daily       OTHER MEDICAL SUPPLIES        WAL-PHED 30 MG tablet TAKE 2 TABLETS BY MOUTH EVERY 6 HOURS AS NEEDED FOR CONGESTION 144 tablet 0     PULMICORT FLEXHALER 180 MCG/ACT inhaler TAKE 2 PUFF BY MOUTH TWICE DAILY 3 Inhaler 2     SPIRIVA HANDIHALER 18 MCG inhalation capsule INHALE 1 CAPSULE BY MOUTH INTO THE LUNGS DAILY 90 capsule 2     Allergies   Allergen Reactions     Ciprofloxacin      Clindamycin Hcl      Recent Labs   Lab Test  05/30/17   1620  02/09/16   1215  02/04/16   1524  03/06/14   1559   A1C   --   5.8*   --    --    ALT  10   --   8  16   CR  0.70*   --   0.65*  0.87   POTASSIUM  4.4   --   4.3  4.7      BP Readings from Last 3 Encounters:   07/20/17 106/74   05/30/17 118/68   08/16/16 104/80    Wt Readings from Last 3 Encounters:   07/20/17 63.5 kg (140 lb)   05/30/17 65.4 kg (144 lb 3.2 oz)   08/16/16 61.2 kg (135 lb)                  Labs reviewed in EPIC    1999 hydrocodeine was ineffective      Reviewed and updated as needed this visit by clinical staff     Reviewed and updated as needed this visit by Provider         ROS:  Constitutional, HEENT, cardiovascular, pulmonary, GI, , musculoskeletal, neuro, skin, endocrine and psych systems are negative, except as otherwise noted.      OBJECTIVE:   /74  Pulse 94  Temp 97.5  F (36.4  C) (Oral)  Resp 16  Ht 1.956 m (6' 5\")  Wt 63.5 kg (140 lb)  SpO2 98%  BMI 16.6 kg/m2  There is no height or weight on file to calculate BMI.  GENERAL: healthy, alert and no distress  EYES: Eyes grossly normal to inspection, PERRL and conjunctivae and sclerae " normal  HENT: ear canals and TM's normal, nose and mouth without ulcers or lesions  NECK: no adenopathy, no asymmetry, masses, or scars and thyroid normal to palpation  RESP: lungs clear to auscultation - no rales, rhonchi or wheezes  CV: regular rate and rhythm, normal S1 S2, no S3 or S4, no murmur, click or rub, no peripheral edema and peripheral pulses strong  ABDOMEN: soft, nontender, no hepatosplenomegaly, no masses and bowel sounds normal  MS: no gross musculoskeletal defects noted, no edema  SKIN: no suspicious lesions or rashes  NEURO: Normal strength and tone, mentation intact and speech normal  PSYCH: mentation appears normal, affect normal/bright  Right inguinal hernia. Penis is normal. Left inguinal negative for hernia.    Diagnostic Test Results:  Results for orders placed or performed in visit on 05/30/17   Comp. Metabolic Panel (14) ([facility])   Result Value Ref Range    Glucose 97 65 - 99 mg/dL    Urea Nitrogen 19 8 - 27 mg/dL    Creatinine 0.70 (L) 0.76 - 1.27 mg/dL    eGFR If NonAfricn Am 89 >59 mL/min/1.73    eGFR If Africn Am 102 >59 mL/min/1.73    BUN/Creatinine Ratio 27 (H) 10 - 24    Sodium 142 134 - 144 mmol/L    Potassium 4.4 3.5 - 5.2 mmol/L    Chloride 103 96 - 106 mmol/L    Total CO2 26 18 - 28 mmol/L    Calcium 9.1 8.6 - 10.2 mg/dL    Protein Total 6.3 6.0 - 8.5 g/dL    Albumin 4.0 3.5 - 4.7 g/dL    Globulin, Total 2.3 1.5 - 4.5 g/dL    A/G Ratio 1.7 1.2 - 2.2    Bilirubin Total 0.5 0.0 - 1.2 mg/dL    Alkaline Phosphatase 89 39 - 117 IU/L    AST 22 0 - 40 IU/L    ALT 10 0 - 44 IU/L    Narrative    Performed at:  01 - [facility] [address], [place], [place]  [id number]  : Magdiel Mcgraw MD, Phone:  [phone]   CBC with Diff/Plt ([facility])   Result Value Ref Range    WBC x10/cmm 7.2 3.8 - 11.0 x10/cmm    % Lymphocytes 19.2 (A) 20.5 - 51.1 %    % Monocytes 5.7 1.7 - 9.3 %    % Granulocytes 75.1 42.2 - 75.2 %    RBC x10/cmm 4.23 4.2 - 5.9 x10/cmm    Hemoglobin 13.5 13.4 - 17.5  g/dl    Hematocrit 40.2 39 - 51 %    MCV 94.9 80 - 100 fL    MCH 31.8 (A) 27.0 - 31.0 pg    MCHC 33.5 33.0 - 37.0 g/dL    Platelet Count 170 140 - 450 K/uL   TSH ([facility])   Result Value Ref Range    TSH 1.770 0.450 - 4.500 uIU/mL    Narrative    Performed at:  01 - [facility] [address] [place], [place]  [id number]  : Magdiel Mcgraw MD, Phone:  [phone]   Thyroxine (T4) Free  Direct  S ([facility])   Result Value Ref Range    T4 Free 1.33 0.82 - 1.77 ng/dL    Narrative    Performed at:  01 - [facility] [address], [place], [place]  [id number]  : Magdiel Mcgraw MD, Phone:  [phone]       ASSESSMENT/PLAN:     ASSESSMENT / PLAN:  (R31.9) Blood in urine  (primary encounter diagnosis)  Comment: h/o of bladder cancer. 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  Plan: Urinalysis w/reflex protein, bili ([facility]), Comp.         Metabolic Panel (14) ([facility]), CBC with         Diff/Plt ([facility])            (Z23) Need for pneumococcal vaccine  Comment:   Plan: PNEUMOCOCCAL VACCINE,ADULT,SQ OR IM, PNEUMOVAX         - MEDICARE            (K40.90) Inguinal hernia  Comment:   Plan: [facility]         Referral to [facility]            (R31.9) Hematuria  Comment:   Plan: [facility]erral to [facility] Referral         to [facility]            (G47.30) Sleep apnea, unspecified type  Comment:   Plan: continue cares        Patient Instructions   Get tetanus vaccine at pharmacy if not done in the last 10 years    Pneumonia vaccine 23    Schedule with Urology f/u bladder cancer and General surgery for right inguinal hernia      Georgia Petit MD  [facility]

## 2017-07-20 NOTE — MR AVS SNAPSHOT
After Visit Summary   7/20/2017    Bong Noguera    MRN: [id number]           Patient Information     Date Of Birth          1935        Visit Information        Provider Department      7/20/2017 1:45 PM Georgia Petit MD [facility]        Today's Diagnoses     Blood in urine    -  1    Need for pneumococcal vaccine          Care Instructions    Get tetanus vaccine at pharmacy if not done in the last 10 years    Pneumonia vaccine 23    Schedule with Urology f/u bladder cancer and General surgery for right inguinal hernia          Follow-ups after your visit        Who to contact     If you have questions or need follow up information about today's clinic visit or your schedule please contact [facility] directly at [phone].  Normal or non-critical lab and imaging results will be communicated to you by [employer] University[employer], letter or phone within 4 business days after the clinic has received the results. If you do not hear from us within 7 days, please contact the clinic through [employer] or phone. If you have a critical or abnormal lab result, we will notify you by phone as soon as possible.  Submit refill requests through Kareo or call your pharmacy and they will forward the refill request to us. Please allow 3 business days for your refill to be completed.          Additional Information About Your Visit        MyChart Information     K[employer]o gives you secure access to your electronic health record. If you see a primary care provider, you can also send messages to your care team and make appointments. If you have questions, please call your primary care clinic.  If you do not have a primary care provider, please call [phone] and they will assist you.        Care EveryWhere ID     This is your Care EveryWhere ID. This could be used by other organizations to access your [place] medical records  [id number]        Your Vitals Were     Pulse Temperature Respirations  "Height Pulse Oximetry BMI (Body Mass Index)    94 97.5  F (36.4  C) (Oral) 16 1.956 m (6' 5\") 98% 16.6 kg/m2       Blood Pressure from Last 3 Encounters:   07/20/17 106/74   05/30/17 118/68   08/16/16 104/80    Weight from Last 3 Encounters:   07/20/17 63.5 kg (140 lb)   05/30/17 65.4 kg (144 lb 3.2 oz)   08/16/16 61.2 kg (135 lb)              We Performed the Following     CBC with Diff/Plt ([facility])     Comp. Metabolic Panel (14) ([facility])     PNEUMOCOCCAL VACCINE,ADULT,SQ OR IM     Urinalysis w/reflex protein, bili ([facility])        Primary Care Provider Office Phone # Fax #    Chaparro Carroll [phone]       [facility] [address] [place] [address]        Equal Access to Services     NADINE PYLE : Hadii aad ku hadasho Soomaali, waaxda luqadaha, qaybta kaalmada adeegyada, waxay idiin hayjennien rory chan . So [facility] [phone].    ATENCIÓN: Si habla español, tiene a llanos disposición servicios gratuitos de asistencia lingüística. Llame al [phone].    We comply with applicable federal civil rights laws and [place] laws. We do not discriminate on the basis of race, color, national origin, age, disability sex, sexual orientation or gender identity.            Thank you!     Thank you for choosing [facility]  for your care. Our goal is always to provide you with excellent care. Hearing back from our patients is one way we can continue to improve our services. Please take a few minutes to complete the written survey that you may receive in the mail after your visit with us. Thank you!             Your Updated Medication List - Protect others around you: Learn how to safely use, store and throw away your medicines at www.disposemymeds.org.          This list is accurate as of: 7/20/17  2:27 PM.  Always use your most recent med list.                   Brand Name Dispense Instructions for use Diagnosis    AFRIN 12 HOUR NA           BREATHE RIGHT ADVANCED " Strp      daily    Need for pneumococcal vaccination       fluticasone 50 MCG/ACT spray    FLONASE    48 mL    SPRAY 1 TO 2 SPRAYS INTO BOTH NOSTRILS DAILY    Encounter for medication refill       OTHER MEDICAL SUPPLIES       Need for pneumococcal vaccination       PULMICORT FLEXHALER 180 MCG/ACT inhaler   Generic drug:  budesonide     3 Inhaler    TAKE 2 PUFF BY MOUTH TWICE DAILY    COPD (chronic obstructive pulmonary disease) (H)       SPIRIVA HANDIHALER 18 MCG capsule   Generic drug:  tiotropium     90 capsule    INHALE 1 CAPSULE BY MOUTH INTO THE LUNGS DAILY    COPD (chronic obstructive pulmonary disease) (H)       tamsulosin 0.4 MG capsule    FLOMAX    90 capsule    TAKE 1 CAPSULE BY MOUTH DAILY    Encounter for medication refill       WAL-PHED 30 MG tablet   Generic drug:  pseudoePHEDrine     144 tablet    TAKE 2 TABLETS BY MOUTH EVERY 6 HOURS AS NEEDED FOR CONGESTION    Encounter for medication refill

## 2017-07-20 NOTE — PATIENT INSTRUCTIONS
Get tetanus vaccine at pharmacy if not done in the last 10 years    Pneumonia vaccine 23    Schedule with Urology f/u bladder cancer and General surgery for right inguinal hernia

## 2017-07-23 NOTE — PROGRESS NOTES
cmp was ok with minor creatinine decrease as before, other kidney tests were fine.  CBC normal hemoglobin. Some other minor changes.   Urine with blood=hematuria.  Please see Urology as we discussed.

## 2017-08-09 NOTE — PROGRESS NOTES
Labs on 7/20/2017  Creatinine 0.71; otherwise, CMP was okay.   %Lymphocyes was 13.7 and %Granulocytes was 82.4; otherwise, CBC was okay.   Color of urine was RED, Protein urine was 2+, Blood in urine was 3+, and Bilirubin was 1+, otherwise UA was okay.     Plan from OV on 7/20/2017:   Schedule with Urology f/u bladder cancer and General surgery for right inguinal hernia     No prescription medication found in the [place] Prescription Monitoring Program.     SUBJECTIVE:                                                    Bong Noguera is a 82 year old male who presents to clinic today for the following health issues:  Elderly white male [place]    Wanting pain medication  Pain =no pain today.Worried about getting pain after surgery? He has not scheduled this yet.   I explained that the provider doing the procedure would likely prescribe for the pain related to that, but we could make suggestions to them given his history. He agreed to that.    Hydrocodone in the past did not work for him.  He took advil in the past and that helped  Alleve takes that minor pain last time 2-4 weeks ago    NO h/o seizure/blackout    ENT doctor gave him medication and he was semiconscious per his report    Tylenol #3 worked in the past. ?Passed out from the medication?      Consider Percocet or Morphine. Dr MARLEY.          Problem list and histories reviewed & adjusted, as indicated.  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     Cachectic (H)     No past surgical history on file.    Social History   Substance Use Topics     Smoking status: Current Some Day Smoker     Packs/day: 0.10     Types: Cigars     Last attempt to quit: 1/1/2007     Smokeless tobacco: Never Used     Alcohol use 0.5 oz/week     1 Cans of beer per week     No family history on file.   "    Current Outpatient Prescriptions   Medication Sig Dispense Refill     fluticasone (FLONASE) 50 MCG/ACT spray SPRAY 1 TO 2 SPRAYS INTO BOTH NOSTRILS DAILY 48 mL 0     Oxymetazoline HCl (AFRIN 12 HOUR NA)        tamsulosin (FLOMAX) 0.4 MG 24 hr capsule TAKE 1 CAPSULE BY MOUTH DAILY 90 capsule 3     Nasal Dilators (BREATHE RIGHT ADVANCED) STRP daily       OTHER MEDICAL SUPPLIES        WAL-PHED 30 MG tablet TAKE 2 TABLETS BY MOUTH EVERY 6 HOURS AS NEEDED FOR CONGESTION 144 tablet 0     PULMICORT FLEXHALER 180 MCG/ACT inhaler TAKE 2 PUFF BY MOUTH TWICE DAILY 3 Inhaler 2     SPIRIVA HANDIHALER 18 MCG inhalation capsule INHALE 1 CAPSULE BY MOUTH INTO THE LUNGS DAILY 90 capsule 2     Allergies   Allergen Reactions     Ciprofloxacin      Clindamycin Hcl      Hydrocodone      Was no help in the past     Tylenol With Codeine [Acetaminophen-Codeine]      He things he passed out with this     Recent Labs   Lab Test  07/20/17   1440  05/30/17   1620  02/09/16   1215  02/04/16   1524   A1C   --    --   5.8*   --    ALT  10  10   --   8   CR  0.71*  0.70*   --   0.65*   POTASSIUM  4.1  4.4   --   4.3      BP Readings from Last 3 Encounters:   08/10/17 110/76   07/20/17 106/74   05/30/17 118/68    Wt Readings from Last 3 Encounters:   08/10/17 64 kg (141 lb)   07/20/17 63.5 kg (140 lb)   05/30/17 65.4 kg (144 lb 3.2 oz)                  Labs reviewed in EPIC          Reviewed and updated as needed this visit by clinical staff     Reviewed and updated as needed this visit by Provider         ROS:  Constitutional, HEENT, cardiovascular, pulmonary, GI, , musculoskeletal, neuro, skin, endocrine and psych systems are negative, except as otherwise noted.      OBJECTIVE:   /76  Pulse 84  Temp 98  F (36.7  C) (Oral)  Resp 16  Ht 1.956 m (6' 5\")  Wt 64 kg (141 lb)  SpO2 96%  BMI 16.72 kg/m2  Body mass index is 16.72 kg/(m^2).  GENERAL: healthy, alert and no distress [place]  EYES: Eyes grossly normal to inspection, PERRL and " conjunctivae and sclerae normal  HENT: ear canals and TM's normal, nose and mouth without ulcers or lesions  NECK: no adenopathy, no asymmetry, masses, or scars and thyroid normal to palpation  RESP: lungs clear to auscultation - no rales, rhonchi or wheezes  CV: regular rate and rhythm, normal S1 S2, no S3 or S4, no murmur, click or rub, no peripheral edema and peripheral pulses strong  ABDOMEN: soft, nontender, no hepatosplenomegaly, no masses and bowel sounds normal  MS: no gross musculoskeletal defects noted, no edema  SKIN: no suspicious lesions or rashes  NEURO: Normal strength and tone, mentation intact and speech normal  PSYCH: mentation appears normal, affect anxious  I did not repeat labs today    Diagnostic Test Results:  Results for orders placed or performed in visit on 07/20/17   Urinalysis w/reflex protein, bili ([facility])   Result Value Ref Range    Color Urine Red (A)     pH Urine 6.5 5 - 9 pH    Specific Gravity Urine 1.020 1.005 - 1.025    Protein Urine 2+ (A) 0.01 mg/dL    Glucose Urine 0     Ketones Urine trace (A)     Leukocyte Esterase Urine 0     Blood Urine 3+ (A)     Nitrite Urine Neg NEG    Bilirubin Urine Dip 1+ (A)     Urobilinogen Urine 1  0.2 - 1.0 EU/dL    WBC Urine 0 0 - 3    RBC Urine packed 0 - 3    Epithelial Cells few     Crystal Urine 0     Bacteria Urine 0     Mucous Urine 0     Casts/LPF 0    Comp. Metabolic Panel (14) ([facility])   Result Value Ref Range    Glucose 74 65 - 99 mg/dL    Urea Nitrogen 17 8 - 27 mg/dL    Creatinine 0.71 (L) 0.76 - 1.27 mg/dL    eGFR If NonAfricn Am 88 >59 mL/min/1.73    eGFR If Africn Am 102 >59 mL/min/1.73    BUN/Creatinine Ratio 24 10 - 24    Sodium 137 134 - 144 mmol/L    Potassium 4.1 3.5 - 5.2 mmol/L    Chloride 98 96 - 106 mmol/L    Total CO2 27 18 - 28 mmol/L    Calcium 9.2 8.6 - 10.2 mg/dL    Protein Total 6.5 6.0 - 8.5 g/dL    Albumin 4.0 3.5 - 4.7 g/dL    Globulin, Total 2.5 1.5 - 4.5 g/dL    A/G Ratio 1.6 1.2 - 2.2    Bilirubin Total 0.5 0.0  - 1.2 mg/dL    Alkaline Phosphatase 87 39 - 117 IU/L    AST 18 0 - 40 IU/L    ALT 10 0 - 44 IU/L    Narrative    Performed at:  01 - [facility] [address], [place], [place]  [id number]  : Magdiel Mcgraw MD, Phone:  [phone]   CBC with Diff/Plt ([facility])   Result Value Ref Range    WBC x10/cmm 9.9 3.8 - 11.0 x10/cmm    % Lymphocytes 13.7 (A) 20.5 - 51.1 %    % Monocytes 3.9 1.7 - 9.3 %    % Granulocytes 82.4 (A) 42.2 - 75.2 %    RBC x10/cmm 4.38 4.2 - 5.9 x10/cmm    Hemoglobin 13.5 13.4 - 17.5 g/dl    Hematocrit 41.1 39 - 51 %    MCV 93.8 80 - 100 fL    MCH 30.9 27.0 - 31.0 pg    MCHC 32.9 (A) 33.0 - 37.0 g/dL    Platelet Count 165 140 - 450 K/uL       ASSESSMENT/PLAN:     ASSESSMENT / PLAN:  (Z85.51) Personal history of malignant neoplasm of bladder  (primary encounter diagnosis)  Comment:   Plan: f/u Dr Trujillo    (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    Please schedule your appointment with him.      Georgia Petit MD  [facility]    Dr Trujillo  [place] [address]    Letter sent

## 2017-08-10 NOTE — LETTER
\        [facility]  [address]  [place] [address]  [phone]  8/10/17     Dear Dr Trujillo:    I visited with Bong Mannkke today. He had a bladder tumor in the past. He should be contacting you for a f/u appointment and potential study for this. He is concerned as his last procedure he had difficulty with pain management.    He reported to me that the following things did not work in the past:  Hydrocodone  Tylenol #3    NSAIDS ibuprofen or alleve have worked for minor pain in the past.    He has not tried Percocet or Morphine. These should be considered post procedure.    His last visit UA was packed with RBCs    Results for orders placed or performed in visit on 07/20/17   Urinalysis w/reflex protein, bili ([facility])   Result Value Ref Range    Color Urine Red (A)     pH Urine 6.5 5 - 9 pH    Specific Gravity Urine 1.020 1.005 - 1.025    Protein Urine 2+ (A) 0.01 mg/dL    Glucose Urine 0     Ketones Urine trace (A)     Leukocyte Esterase Urine 0     Blood Urine 3+ (A)     Nitrite Urine Neg NEG    Bilirubin Urine Dip 1+ (A)     Urobilinogen Urine 1  0.2 - 1.0 EU/dL    WBC Urine 0 0 - 3    RBC Urine packed 0 - 3    Epithelial Cells few     Crystal Urine 0     Bacteria Urine 0     Mucous Urine 0     Casts/LPF 0    Comp. Metabolic Panel (14) ([facility])   Result Value Ref Range    Glucose 74 65 - 99 mg/dL    Urea Nitrogen 17 8 - 27 mg/dL    Creatinine 0.71 (L) 0.76 - 1.27 mg/dL    eGFR If NonAfricn Am 88 >59 mL/min/1.73    eGFR If Africn Am 102 >59 mL/min/1.73    BUN/Creatinine Ratio 24 10 - 24    Sodium 137 134 - 144 mmol/L    Potassium 4.1 3.5 - 5.2 mmol/L    Chloride 98 96 - 106 mmol/L    Total CO2 27 18 - 28 mmol/L    Calcium 9.2 8.6 - 10.2 mg/dL    Protein Total 6.5 6.0 - 8.5 g/dL    Albumin 4.0 3.5 - 4.7 g/dL    Globulin, Total 2.5 1.5 - 4.5 g/dL    A/G Ratio 1.6 1.2 - 2.2    Bilirubin Total 0.5 0.0 - 1.2 mg/dL    Alkaline Phosphatase 87 39 - 117 IU/L    AST 18 0 - 40 IU/L    ALT 10 0 - 44 IU/L     Narrative    Performed at:  01 - [facility]  [address] [place], [place]  [id number]  : Magdiel Mcgraw MD, Phone:  [phone]   CBC with Diff/Plt ([facility])   Result Value Ref Range    WBC x10/cmm 9.9 3.8 - 11.0 x10/cmm    % Lymphocytes 13.7 (A) 20.5 - 51.1 %    % Monocytes 3.9 1.7 - 9.3 %    % Granulocytes 82.4 (A) 42.2 - 75.2 %    RBC x10/cmm 4.38 4.2 - 5.9 x10/cmm    Hemoglobin 13.5 13.4 - 17.5 g/dl    Hematocrit 41.1 39 - 51 %    MCV 93.8 80 - 100 fL    MCH 30.9 27.0 - 31.0 pg    MCHC 32.9 (A) 33.0 - 37.0 g/dL    Platelet Count 165 140 - 450 K/uL     I have attached a copy of his most recent CT report from last year.    Feel free to contact me.    Georgia Petit MD, [facility]

## 2017-08-10 NOTE — LETTER
\        [facility]  [address] [place] [address]  [phone]  8/10/17    Dr Trujillo  [facility]  [address] [place] [address]     Dear Dr Trujillo:    I visited with Bong Noguera today. He had a bladder tumor in the past. He should be contacting you for a f/u appointment and potential study for this. He is concerned as his last procedure he had difficulty with pain management.    He reported to me that the following things did not work in the past:  Hydrocodone  Tylenol #3    NSAIDS ibuprofen or alleve have worked for minor pain in the past.    He has not tried Percocet or Morphine. These should be considered post procedure.    His last visit UA was packed with RBCs    Results for orders placed or performed in visit on 07/20/17   Urinalysis w/reflex protein, bili ([facility])   Result Value Ref Range    Color Urine Red (A)     pH Urine 6.5 5 - 9 pH    Specific Gravity Urine 1.020 1.005 - 1.025    Protein Urine 2+ (A) 0.01 mg/dL    Glucose Urine 0     Ketones Urine trace (A)     Leukocyte Esterase Urine 0     Blood Urine 3+ (A)     Nitrite Urine Neg NEG    Bilirubin Urine Dip 1+ (A)     Urobilinogen Urine 1  0.2 - 1.0 EU/dL    WBC Urine 0 0 - 3    RBC Urine packed 0 - 3    Epithelial Cells few     Crystal Urine 0     Bacteria Urine 0     Mucous Urine 0     Casts/LPF 0    Comp. Metabolic Panel (14) ([facility])   Result Value Ref Range    Glucose 74 65 - 99 mg/dL    Urea Nitrogen 17 8 - 27 mg/dL    Creatinine 0.71 (L) 0.76 - 1.27 mg/dL    eGFR If NonAfricn Am 88 >59 mL/min/1.73    eGFR If Africn Am 102 >59 mL/min/1.73    BUN/Creatinine Ratio 24 10 - 24    Sodium 137 134 - 144 mmol/L    Potassium 4.1 3.5 - 5.2 mmol/L    Chloride 98 96 - 106 mmol/L    Total CO2 27 18 - 28 mmol/L    Calcium 9.2 8.6 - 10.2 mg/dL    Protein Total 6.5 6.0 - 8.5 g/dL    Albumin 4.0 3.5 - 4.7 g/dL    Globulin, Total 2.5 1.5 - 4.5 g/dL    A/G Ratio 1.6 1.2 - 2.2    Bilirubin Total 0.5 0.0 - 1.2 mg/dL     Alkaline Phosphatase 87 39 - 117 IU/L    AST 18 0 - 40 IU/L    ALT 10 0 - 44 IU/L    Narrative    Performed at:  01 - [facility] [address], [place], [place]  [id number]  : Magdiel Mcgraw MD, Phone:  [phone]   CBC with Diff/Plt [place]   Result Value Ref Range    WBC x10/cmm 9.9 3.8 - 11.0 x10/cmm    % Lymphocytes 13.7 (A) 20.5 - 51.1 %    % Monocytes 3.9 1.7 - 9.3 %    % Granulocytes 82.4 (A) 42.2 - 75.2 %    RBC x10/cmm 4.38 4.2 - 5.9 x10/cmm    Hemoglobin 13.5 13.4 - 17.5 g/dl    Hematocrit 41.1 39 - 51 %    MCV 93.8 80 - 100 fL    MCH 30.9 27.0 - 31.0 pg    MCHC 32.9 (A) 33.0 - 37.0 g/dL    Platelet Count 165 140 - 450 K/uL     I have attached a copy of his most recent CT report from last year.    Feel free to contact me.    Georgia Petit MD, [facility]

## 2017-08-10 NOTE — MR AVS SNAPSHOT
After Visit Summary   8/10/2017    Bong Noguera    MRN: [id number]           Patient Information     Date Of Birth          1935        Visit Information        Provider Department      8/10/2017 3:30 PM Georgia Petit MD [facility]        Today's Diagnoses     Personal history of malignant neoplasm of bladder    -  1    Cancer associated pain        Hematuria          Care Instructions    Per your request we sent Dr Trujillo a letter about your concerns for pain control.    Please schedule your appointment with him.          Follow-ups after your visit        Follow-up notes from your care team     Return if symptoms worsen or fail to improve.      Who to contact     If you have questions or need follow up information about today's clinic visit or your schedule please contact [facility] directly at [phone].  Normal or non-critical lab and imaging results will be communicated to you by MyChart, letter or phone within 4 business days after the clinic has received the results. If you do not hear from us within 7 days, please contact the clinic through Emu Messengerhart or phone. If you have a critical or abnormal lab result, we will notify you by phone as soon as possible.  Submit refill requests through Clarity or call your pharmacy and they will forward the refill request to us. Please allow 3 business days for your refill to be completed.          Additional Information About Your Visit        MyChart Information     Clarity gives you secure access to your electronic health record. If you see a primary care provider, you can also send messages to your care team and make appointments. If you have questions, please call your primary care clinic.  If you do not have a primary care provider, please call [phone] and they will assist you.        Care EveryWhere ID     This is your Care EveryWhere ID. This could be used by other organizations to access your [facility]  "records  [id number]        Your Vitals Were     Pulse Temperature Respirations Height Pulse Oximetry BMI (Body Mass Index)    84 98  F (36.7  C) (Oral) 16 1.956 m (6' 5\") 96% 16.72 kg/m2       Blood Pressure from Last 3 Encounters:   08/10/17 110/76   07/20/17 106/74   05/30/17 118/68    Weight from Last 3 Encounters:   08/10/17 64 kg (141 lb)   07/20/17 63.5 kg (140 lb)   05/30/17 65.4 kg (144 lb 3.2 oz)              Today, you had the following     No orders found for display       Primary Care Provider Office Phone # Fax #    Chaparro Carroll [phone] [address]  [facility] [address]        Equal Access to Services     [facility]: Hadii janelle gonzalez hadasho Soomaali, waaxda luqadaha, qaybta kaalmada adeegyada, ceci barry haysushil chan . So [facility] [phone].    ATENCIÓN: Si habla español, tiene a llanos disposición servicios gratuitos de asistencia lingüística. Llame al [phone].    We comply with applicable federal civil rights laws and [place] laws. We do not discriminate on the basis of race, color, national origin, age, disability sex, sexual orientation or gender identity.            Thank you!     Thank you for choosing [facility]  for your care. Our goal is always to provide you with excellent care. Hearing back from our patients is one way we can continue to improve our services. Please take a few minutes to complete the written survey that you may receive in the mail after your visit with us. Thank you!             Your Updated Medication List - Protect others around you: Learn how to safely use, store and throw away your medicines at www.disposemymeds.org.          This list is accurate as of: 8/10/17 11:59 PM.  Always use your most recent med list.                   Brand Name Dispense Instructions for use Diagnosis    AFRIN 12 HOUR NA       Nasal congestion       BREATHE RIGHT ADVANCED Strp      daily    Need for pneumococcal " vaccination       fluticasone 50 MCG/ACT spray    FLONASE    48 mL    SPRAY 1 TO 2 SPRAYS INTO BOTH NOSTRILS DAILY    Encounter for medication refill       OTHER MEDICAL SUPPLIES       Need for pneumococcal vaccination       PULMICORT FLEXHALER 180 MCG/ACT inhaler   Generic drug:  budesonide     3 Inhaler    TAKE 2 PUFF BY MOUTH TWICE DAILY    COPD (chronic obstructive pulmonary disease) (H)       SPIRIVA HANDIHALER 18 MCG capsule   Generic drug:  tiotropium     90 capsule    INHALE 1 CAPSULE BY MOUTH INTO THE LUNGS DAILY    COPD (chronic obstructive pulmonary disease) (H)       tamsulosin 0.4 MG capsule    FLOMAX    90 capsule    TAKE 1 CAPSULE BY MOUTH DAILY    Encounter for medication refill       WAL-PHED 30 MG tablet   Generic drug:  pseudoePHEDrine     144 tablet    TAKE 2 TABLETS BY MOUTH EVERY 6 HOURS AS NEEDED FOR CONGESTION    Encounter for medication refill

## 2017-08-14 NOTE — PATIENT INSTRUCTIONS
Per your request we sent Dr Trujillo a letter about your concerns for pain control.    Please schedule your appointment with him.

## 2017-09-15 NOTE — IP AVS SNAPSHOT
` ` Patient Information     Patient Name Sex     Bong Madrigal ([id number]) Male 1935       Room Bed    34 8834-02      Patient Demographics     Address Phone E-mail Address    [address]  [facility] [address] [phone] (Home)  [phone] (Mobile) [email or web address]      Patient Ethnicity & Race     Ethnic Group Patient Race    American White      Emergency Contact(s)     Name Relation Home Work Mobile    MADHAV MADRIGAL  Brother [phone]      Brooks Madrigal  Other   [phone]      Documents on File        Status Date Received Description       Documents for the Patient    Consent for Services - [facility] Received () 11     Privacy Notice - [facility] Received 11 [facility]-PRIVACY    Insurance Card Received () 11     Patient ID       Consent for EHR Access  13 Copied from existing Consent for services - [facility] collected on 2011    Consent for Services - [facility] Received () 14 [facility]-CONSENT    External Medication Information Consent Accepted 14 [facility]-EXTERNAL    Insurance Card Received () 14 [facility]-MEDICARE    Consent for Services - [facility] Received () 16 [facility]-CONSENT    Insurance Card Received 17 [facility]-MEDICARE    Consent for Services - [facility] Received 17 [facility]-CONSENT    Insurance Card       [facility] Specified Other       Consent for Services/Privacy Notice - Hospital/Clinic Received 09/15/17     Privacy Notice - [place] Received 09/15/17     Consent for Services/Privacy Notice - Hospital/Clinic Received (Deleted) 09/15/17        Documents for the Encounter    CMS IM for Patient Signature Received 17 1MM    EMS/Ambulance Record  17 [facility]    CMS IM for Patient Signature Received 17 2MM      Admission Information     Attending Provider Admitting Provider Admission Type Admission Date/Time    Monalisa Koehler MD Martin, Molly E, MD Emergency 09/15/17  1877    Discharge Date Hospital Service  Auth/Cert Status Service Area     Hospitalist Incomplete [facility]    Unit Room/Bed Admission Status        88 ONCOLOGY 8834/8834-02 Admission (Confirmed)       Admission     Complaint    Pneumonia      Hospital Account     Name Acct ID Class Status Primary Coverage    Bong Noguera [id number] Inpatient Open MEDICARE - MEDICARE            Guarantor Account (for Hospital Account #[id number])     Name Relation to Pt Service Area Active? Acct Type    Bong Noguera Self FCS Yes Personal/Family    Address Phone          [address] [place] [address] [phone](H)              Coverage Information (for Hospital Account #[id number])     F/O Payor/Plan Precert #    MEDICARE/MEDICARE     Subscriber Subscriber #    Bong Noguera [id number]    Address Phone    [employer] CLAIM[employer]  [address]  [facility] [place] [address] [phone]

## 2017-09-15 NOTE — IP AVS SNAPSHOT
[place] [address] ONCOLOGY: [phone]                                              INTERAGENCY TRANSFER FORM - LAB / IMAGING / EKG / EMG RESULTS   9/15/2017                    Hospital Admission Date: 9/15/2017  KAYLA MADRIGAL   : 1935  Sex: Male        Attending Provider: Monalisa Koehler MD     Allergies:  Ciprofloxacin, Clindamycin Hcl, Hydrocodone, Tylenol With Codeine [Acetaminophen-codeine]    Infection:  None   Service:  HOSPITALIST    Ht:  --   Wt:  65.5 kg (144 lb 6.4 oz)   Admission Wt:  65 kg (143 lb 4.8 oz)    BMI:  --   BSA:  --            Patient PCP Information     Provider PCP Type    Parris Carroll MD General      Unresulted Labs     None         ECG/EMG Results      Echo Complete with Lumason [[id number]]  Resulted: 17 1014, Result status: Edited Result - FINAL    Ordering provider: Judy Singer DO  17 0749 Resulted by: Milton Gunn MD    Performed: 17 1044 - 17 1045 Resulting lab: RADIOLOGY RESULTS    Narrative:       [id number]  [id number]  [id number]  [id number]^MAURICIO^JUDY^CHRISTIAN           [facility]  Echocardiography Laboratory  [address] [place] [address]        Name: KAYLA MADRIGAL  MRN: [id number]  : 1935  Study Date: 2017 10:14 AM  Age: 82 yrs  Gender: Male  Patient Location: [facility]  Reason For Study: Pulmonary Edema  Ordering Physician: JUDY SINGER  Referring Physician: PARRIS CARROLL  Performed By: Karyn Mc     BSA: 1.9 m2  Height: 77 in  Weight: 141 lb  HR: 108  BP: 80/47 mmHg  _____________________________________________________________________________  __        Procedure  Complete Portable Echo Adult. Contrast Lumason.  _____________________________________________________________________________  __        Interpretation Summary     Technically challenging images. Patient was tachycardic with heart rates of  110-120 BPM and hypotensive at 80/47 mmHg.      1. Left ventricular systolic function is severely reduced with a visually  estimated LVEF of 30-35%. Normal cavity size.  2. There is global hypokinesis but the inferior and inferolateral walls are  almost akinetic.  3. Mildly dilated right ventricle with moderately reduced systolic function.  4. Trace mitral regurgitation. Mild tricuspid regurgitation.  5. The right ventricular systolic pressure is approximated at 29.2 mmHg plus  the right atrial pressure.  6. The aortic valve is likely bicuspid, without hemodynamically significant  stenosis or regurgitation. The ascending aorta was not imaged.     There is no comparison study available.  _____________________________________________________________________________  __        Left Ventricle  The left ventricle is normal in size. There is normal left ventricular wall  thickness. Left ventricular systolic function is severely reduced. The visual  ejection fraction is estimated at 30-35%. Grade I or early diastolic  dysfunction. There is inferior wall akinesis. There is inferolateral wall  akinesis. There is severe global hypokinesia of the left ventricle.     Right Ventricle  The right ventricle is mildly dilated. Moderately decreased right ventricular  systolic function.     Atria  The left atrium is mildly dilated. Right atrial size is normal. There is no  color Doppler evidence of an atrial shunt.     Mitral Valve  The mitral valve leaflets are mildly thickened. There is moderate mitral  annular calcification. There is trace mitral regurgitation.        Tricuspid Valve  Normal tricuspid valve. There is mild (1+) tricuspid regurgitation. The right  ventricular systolic pressure is approximated at 29.2 mmHg plus the right  atrial pressure.     Aortic Valve  The aortic valve is bicuspid. There is trace aortic regurgitation. No  hemodynamically significant valvular aortic stenosis.     Pulmonic Valve  The pulmonic valve is not well seen, but is grossly normal. There  is trace  pulmonic valvular regurgitation. Normal pulmonic valve velocity.     Vessels  The ascending aorta was not imaged. Dilation of the inferior vena cava is  present with normal respiratory variation in diameter.     Pericardium  There is no pericardial effusion.        Rhythm  The rhythm was sinus tachycardia.  _____________________________________________________________________________  __  MMode/2D Measurements & Calculations  IVSd: 0.82 cm     LVIDd: 4.4 cm  LVIDs: 3.6 cm  LVPWd: 0.81 cm  FS: 17.9 %  EDV(Teich): 86.1 ml  ESV(Teich): 53.9 ml  LV mass(C)d: 110.7 grams  LV mass(C)dI: 57.4 grams/m2  Ao root diam: 3.5 cm  LA dimension: 3.4 cm  LA/Ao: 0.96  LVOT diam: 2.2 cm  LVOT area: 3.8 cm2        Doppler Measurements & Calculations  MV E max pily: 63.1 cm/sec  MV A max pily: 95.1 cm/sec  MV E/A: 0.66  MV dec time: 0.15 sec  PA V2 max: 87.3 cm/sec  PA max PG: 3.0 mmHg  PA acc time: 0.08 sec  TR max pily: 270.1 cm/sec  TR max P.2 mmHg  Lateral E/e': 6.9  Medial E/e': 10.7              _____________________________________________________________________________  __        Report approved by: Dr Milton Cates 2017 04:39 PM       1    Type Source Collected On     17 1014          View Image (below)        Echocardiogram Complete [[id number]]  Resulted: 17 1045, Result status: In process    Ordering provider: Mariano Singer DO  17 0749 Performed: 17 1044 - 17 1044    Resulting lab: RADIANT                   Encounter-Level Documents:     There are no encounter-level documents.      Order-Level Documents:     There are no order-level documents.

## 2017-09-15 NOTE — IP AVS SNAPSHOT
` [place] [address] ONCOLOGY: [phone]            Medication Administration Report for Bong Noguera as of 09/29/17 1826   Legend:    Given Hold Not Given Due Canceled Entry Other Actions    Time Time (Time) Time  Time-Action       Inactive    Active    Linked        Medications 09/23/17 09/24/17 09/25/17 09/26/17 09/27/17 09/28/17 09/29/17    acetaminophen (TYLENOL) tablet 650 mg  Dose: 650 mg Freq: EVERY 4 HOURS PRN Route: PO  PRN Reason: mild pain  Start: 09/16/17 0121   Admin Instructions: Alternate ibuprofen (if ordered) with acetaminophen.  Maximum acetaminophen dose from all sources = 75 mg/kg/day not to exceed 4 grams/day.     0758 (650 mg)-Given          0220 (650 mg)-Given              albuterol (PROVENTIL) neb solution 2.5 mg  Dose: 2.5 mg Freq: EVERY 2 HOURS PRN Route: NEBULIZATION  PRN Reason: wheezing  Start: 09/20/17 1519              albuterol neb solution 2.5 mg  Dose: 3 mL Freq: EVERY 4 HOURS WHILE AWAKE Route: NEBULIZATION  Start: 09/20/17 1530   Admin Instructions: Formulary alternate for<br>duonebs since using spiriva     0658 (2.5 mg)-Given       1128 (2.5 mg)-Given       (1511)-Not Given       2022 (2.5 mg)-Given       (2339)-Not Given        0738 (2.5 mg)-Given       1144 (2.5 mg)-Given       1524 (2.5 mg)-Given       1914 (2.5 mg)-Given       (2309)-Not Given        0727 (2.5 mg)-Given       (1044)-Not Given       (1514)-Not Given       1943 (2.5 mg)-Given       2336 (2.5 mg)-Given        0703 (2.5 mg)-Given       1126 (2.5 mg)-Given       1527 (2.5 mg)-Given       1907 (2.5 mg)-Given       2335 (2.5 mg)-Given        0805-Hold       1025 (2.5 mg)-Given       1612 (2.5 mg)-Given       1915 (2.5 mg)-Given       2351 (2.5 mg)-Given        0710 (2.5 mg)-Given       1059 (2.5 mg)-Given       1602 (2.5 mg)-Given       1850 (2.5 mg)-Given        (0001)-Not Given       0727 (2.5 mg)-Given       1059 (2.5 mg)-Given       1511 (2.5 mg)-Given       [ ] 1900       [ ] 2300            artificial saliva (BIOTENE MT) spray 1 spray  Dose: 1 spray Freq: EVERY 6 HOURS PRN Route: MT  PRN Reason: dry mouth  Start: 09/21/17 1943              atropine 1 % ophthalmic solution 1-2 drop  Dose: 1-2 drop Freq: EVERY 1 HOUR PRN Route: SL  PRN Reason: other  PRN Comment: secretions  Start: 09/26/17 1047              bisacodyl (DULCOLAX) Suppository 10 mg  Dose: 10 mg Freq: DAILY PRN Route: RE  PRN Reason: constipation  Start: 09/16/17 0121   Admin Instructions: Hold for loose stools.  This is the third step of a three step constipation treatment protocol.               fluticasone (FLONASE) 50 MCG/ACT spray 1 spray  Dose: 1 spray Freq: DAILY PRN Route: BOTH NOSTRIL  PRN Reason: rhinitis  Start: 09/16/17 0118       0851 (1 spray)-Given        1940 (1 spray)-Given        0855 (1 spray)-Given            fluticasone furoate (ARNUITY ELLIPTA) 100 MCG/ACT inhalation powder 1 puff  Dose: 1 puff Freq: DAILY Route: IN  Start: 09/16/17 0900    0830 (1 puff)-Given        0809 (1 puff)-Given        (1425)-Not Given        0849 (1 puff)-Given        0935 (1 puff)-Given        0855 (1 puff)-Given        0925 (1 puff)-Given           guaiFENesin (ROBITUSSIN) 20 mg/mL solution 10 mL  Dose: 10 mL Freq: EVERY 4 HOURS PRN Route: PO  PRN Reason: cough  Start: 09/16/17 0130         2131 (10 mL)-Given        1205 (10 mL)-Given           hypromellose-dextran (ARTIFICAL TEARS) ophthalmic solution 1-2 drop  Dose: 1-2 drop Freq: EVERY 8 HOURS PRN Route: Both Eyes  PRN Reason: dry eyes  Start: 09/26/17 1047   Admin Instructions: Offer every shift.               influenza Vac Split High-Dose (FLUZONE) injection 0.5 mL  Dose: 0.5 mL Freq: PRIOR TO DISCHARGE Route: IM  Start: 09/21/17 1000   Admin Instructions: Administer when afebrile (less than 100.4F) x 24 hours, or Prior to Discharge.  If not administering when scheduled , change the due time by following the instructions in the reference link below.  If patient refuses vaccine, chart as  "Vaccine Refused.     (1037)-Vaccine Refused [C]                 lidocaine (LMX4) cream  Freq: EVERY 1 HOUR PRN Route: Top  PRN Reason: pain  PRN Comment: with VAD insertion or accessing implanted port.  Start: 09/20/17 1031   Admin Instructions: Do NOT give if patient has a history of allergy to any local anesthetic or any \"erickson\" product.   Apply 30 minutes prior to VAD insertion or port access.  MAX Dose:  2.5 g (  of 5 g tube)               lidocaine 1 % 1 mL  Dose: 1 mL Freq: EVERY 1 HOUR PRN Route: OTHER  PRN Comment: mild pain with VAD insertion or accessing implanted port  Start: 09/20/17 1031   Admin Instructions: Do NOT give if patient has a history of allergy to any local anesthetic or any \"erickson\" product. MAX dose 1 mL subcutaneous OR intradermal in divided doses.               melatonin tablet 3 mg  Dose: 3 mg Freq: AT BEDTIME PRN Route: PO  PRN Reason: Insomnia  Start: 09/20/17 1640              metoprolol (LOPRESSOR) injection 2.5 mg  Dose: 2.5 mg Freq: EVERY 4 HOURS PRN Route: IV  PRN Reason: other  PRN Comment: HR > 130, hold for SBP < 90  Start: 09/21/17 1336              mirtazapine (REMERON) tablet TABS 7.5 mg  Dose: 7.5 mg Freq: AT BEDTIME Route: PO  Start: 09/28/17 2200         2126 (7.5 mg)-Given        [ ] 2200           morphine solution 5-10 mg  Dose: 5-10 mg Freq: EVERY 2 HOURS PRN Route: PO  PRN Reason: moderate to severe pain  PRN Comment: or dyspnea  Start: 09/26/17 1043                        1205 (5 mg)-Given          Or  morphine sulfate HIGH CONCENTRATE (ROXANOL *CONCENTRATED*) 100 MG/5ML (HIGH CONC) solution 5-10 mg  Dose: 5-10 mg Freq: EVERY 2 HOURS PRN Route: SL  PRN Reasons: moderate to severe pain,other  PRN Comment: or dyspnea  Start: 09/26/17 1043   Admin Instructions: Caution: Solution is 10 times more concentrated than standard solution. Verify dose volume.         0126 (5 mg)-Given        2146 (10 mg)-Given                   naloxone (NARCAN) injection 0.1-0.4 mg  Dose: " 0.1-0.4 mg Freq: EVERY 2 MIN PRN Route: IV  PRN Reason: opioid reversal  Start: 09/16/17 0121   Admin Instructions: For respiratory rate LESS than or EQUAL to 8.  Partial reversal dose:  0.1 mg titrated q 2 minutes for Analgesia Side Effects Monitoring Sedation Level of 3 (frequently drowsy, arousable, drifts to sleep during conversation).Full reversal dose:  0.4 mg bolus for Analgesia Side Effects Monitoring Sedation Level of 4 (somnolent, minimal or no response to stimulation).               nicotine polacrilex (NICORETTE) gum 2 mg  Dose: 2 mg Freq: EVERY 1 HOUR PRN Route: BU  PRN Reason: smoking cessation  Start: 09/16/17 0751   Admin Instructions: Gum should be chewed slowly until it tingles, then placed between cheek and gum:  when tingle gone, repeat process until tingle gone (about 30 minutes).      1058 (2 mg)-Given       1251 (2 mg)-Given       1459 (2 mg)-Given       1645 (2 mg)-Given         1312 (2 mg)-Given       1557 (2 mg)-Given        (1842)-Not Given [C]         0616 (2 mg)-Given           nicotine polacrilex (NICORETTE) gum 4 mg  Dose: 4 mg Freq: EVERY 1 HOUR PRN Route: BU  PRN Reason: smoking cessation  Start: 09/26/17 1601   Admin Instructions: Gum should be chewed slowly until it tingles, then placed between cheek and gum:  when tingle gone, repeat process until tingle gone (about 30 minutes).         0315 (4 mg)-Given       1845 (4 mg)-Given        0345 (4 mg)-Given       1747 (4 mg)-Given        0100 (4 mg)-Given [C]           nitroGLYcerin (NITROSTAT) sublingual tablet 0.4 mg  Dose: 0.4 mg Freq: EVERY 5 MIN PRN Route: SL  PRN Reason: chest pain  Start: 09/20/17 1031   Admin Instructions: Maximum 3 doses in 15 minutes.  Notify provider if no relief after 3 doses.    Do NOT give nitroglycerin SL IF the patient has taken avanafil (STENDRA), sildenafil (VIAGRA) or (REVATIO) within the last 8 hours, vardenafil (LEVITRA) or (STAXYN) within the last 18 hours, tadalafil (CIALIS) or (ADCIRCA) within  the last 36 hours. Inform provider if patient has taken one of these medications.  If patient is still having acute angina requiring treatment, an alternative treatment option may be used such as: IV beta-blocker [2.5 mg - 5 mg metoprolol (LOPRESSOR)] if ordered by a provider.               ondansetron (ZOFRAN-ODT) ODT tab 4 mg  Dose: 4 mg Freq: EVERY 6 HOURS PRN Route: PO  PRN Reasons: nausea,vomiting  Start: 09/16/17 0121   Admin Instructions: This is Step 1 of nausea and vomiting management.  If nausea not resolved in 15 minutes, go to Step 2 prochlorperazine (COMPAZINE). Do not push through foil backing. Peel back foil and gently remove. Place on tongue immediately. Administration with liquid unnecessary              Or  ondansetron (ZOFRAN) injection 4 mg  Dose: 4 mg Freq: EVERY 6 HOURS PRN Route: IV  PRN Reasons: nausea,vomiting  Start: 09/16/17 0121   Admin Instructions: This is Step 1 of nausea and vomiting management.  If nausea not resolved in 15 minutes, go to Step 2 prochlorperazine (COMPAZINE).  Irritant.               oxymetazoline (AFRIN) 0.05 % spray 2 spray  Dose: 2 spray Freq: 2 TIMES DAILY Route: BOTH NOSTRIL  Start: 09/26/17 2100   Admin Instructions: Use for more than 3 consecutive days may cause rebound vasodilation.        2340 (2 spray)-Given        0841 (2 spray)-Given       2011 (2 spray)-Given        0856 (2 spray)-Given       2000 (2 spray)-Given        0925 (2 spray)-Given       [ ] 2100           Patient may continue current oral medications  Freq: CONTINUOUS PRN Route: XX  Start: 09/17/17 0321              polyethylene glycol (MIRALAX/GLYCOLAX) Packet 17 g  Dose: 17 g Freq: DAILY PRN Route: PO  PRN Reason: constipation  Start: 09/16/17 0121   Admin Instructions: Give in 8oz of  water, juice, or soda. Hold for loose stools.  This is the second step of a three step constipation treatment protocol.  1 Packet = 17 grams. Mixed prescribed dose in 8 ounces of water. Follow with 8 oz. of  water.               pseudoePHEDrine (SUDAFED) tablet 30 mg  Dose: 30 mg Freq: DAILY PRN Route: PO  PRN Reason: congestion  Start: 09/16/17 0118         2212 (30 mg)-Given            Reason ACE/ARB order not selected  Freq: DOES NOT GO TO MAR Route: OTHER  PRN Reason: other  Start: 09/29/17 1331   Admin Instructions: Patient is now comfort cares               senna-docusate (SENOKOT-S;PERICOLACE) 8.6-50 MG per tablet 1-2 tablet  Dose: 1-2 tablet Freq: 2 TIMES DAILY PRN Route: PO  PRN Comment: constipation   Start: 09/16/17 0121   Admin Instructions: If no bowel movement in 24 hours, increase to 2 tablets PO BID.  Hold for loose stools.   This is the first step of a three step constipation treatment protocol.               sodium chloride (OCEAN) 0.65 % nasal spray 1-2 spray  Dose: 1-2 spray Freq: EVERY 1 HOUR PRN Route: NA  PRN Reason: other  PRN Comment: dry nasal passages  Start: 09/28/17 1915   Admin Instructions: To affected nostril(s)          2000 (2 spray)-Given       2150 (1 spray)-Given            sodium chloride (PF) 0.9% PF flush 10 mL  Dose: 10 mL Freq: ONCE Route: IK  Start: 09/17/17 1045              tamsulosin (FLOMAX) capsule 0.4 mg  Dose: 0.4 mg Freq: WEEKLY Route: PO  Start: 09/16/17 0130   Admin Instructions: Administer 30 minutes after the same meal each day.  Capsules should be swallowed whole; do not crush chew or open.     (0054)-Not Given [C]       1253 (0.4 mg)-Given                 tiotropium (SPIRIVA) capsule 18 mcg  Dose: 18 mcg Freq: DAILY Route: IN  Start: 09/16/17 0800   Admin Instructions: Use only ONE capsule. To ensure the full dose is administered, TWO inhalations should be performed at a rate sufficient to hear or feel the capsule vibrate.     0802 (18 mcg)-Given        0809 (18 mcg)-Given        (1425)-Not Given        0851 (18 mcg)-Given        0935 (18 mcg)-Given        (1431)-Not Given        0924 (18 mcg)-Given          Discontinued Medications  Medications 09/23/17 09/24/17  09/25/17 09/26/17 09/27/17 09/28/17 09/29/17         Dose: 1-2 spray Freq: EVERY 1 HOUR PRN Route: NA  PRN Reason: other  PRN Comment: dry nasal passages  Start: 09/17/17 1445   End: 09/28/17 1923   Admin Instructions: To affected nostril(s)          1425 (2 spray)-Given       1923-Med Discontinued          Dose: 3 mL Freq: EVERY 8 HOURS Route: IK  Start: 09/20/17 1045   End: 09/27/17 1642   Admin Instructions: And Q1H PRN, to lock peripheral IV dormant line.     (0318)-Not Given       (1230)-Not Given       2205 (3 mL)-Given        0429 (3 mL)-Given       1251 (3 mL)-Given       1622 (3 mL)-Given        0157 (3 mL)-Given [C]              1704 (3 mL)-Given        (0136)-Not Given       0853 (3 mL)-Given       1805 (3 mL)-Given        0125 (3 mL)-Given       (0936)-Not Given              1642-Med Discontinued           Dose: 3 mL Freq: EVERY 1 HOUR PRN Route: IK  PRN Reason: line flush  PRN Comment: for peripheral IV flush post IV meds  Start: 09/20/17 1031   End: 09/27/17 1642        1642-Med Discontinued

## 2017-09-15 NOTE — IP AVS SNAPSHOT
[place] [address] ONCOLOGY: [phone]                                              INTERAGENCY TRANSFER FORM - PHYSICIAN ORDERS   9/15/2017                   CHF Orders/Instructions for Nursing Home/TCU     CHF Orders and Instructions for Nursing Home/TCU  1.  Have the patient get a scale to put in their room and then use at home.  2.  Post a weight chart or calendar in room next to the scale.  3.  Ask patient to weigh themselves daily first thing when they get up in the morning, before breakfast, with only their night gown on and record on the chart/calendar (if able).  4.  Record NH/TCU admission weight.  5.  Record daily am weight, with same clothes every day. If patient is in a wheelchair, note weight of wheel chair.   6.  Provide patient CHF education booklet and review with patient and family.  7.  Vital signs twice daily and prn. Check oxygen sats prn dyspnea.  8.  Apply Oxygen 2 or 3 liters/min by nasal cannula prn O2 Sat < 90%.   9.  Notify NP/MD:   1. If HR <50 bpm, SBP <90mmHg, or O2 Sat < 90%.   2. If weight is up more than 2 lbs. in one day or 5 lbs. in one week from their admission weight OR if patient has signs or symptoms of CHF, such as worsening dyspnea or leg edema.  10.  ACE-wraps or support stockings (knee high) to bilateral lower extremities prn edema. On in am and off at HS.   11.  Diet: 2 gm sodium cardiac diet, with additional diet modifications if ordered elsewhere.  12.  Fluid restriction:  1500cc/day, if hospital discharge sodium < 134.  13.  Dietician: CHF education  14.  PT/OT to Evaluate and Treat for deconditioning and CHF.  12.  Activity as tolerated   13.  PT to notify RN if wheelchair equipment has been modified (change in weight should also be noted on the patients weight calendar).    Heart Failure Follow-up Appointments and Instructions for TCU Discharge   _____An appointment to enroll the patient into [facility] may already have been made (see section  Your next  "appointments already scheduled ).  If there is a question about the appointment or you would like to speak with a [facility] nurse, please call one of the following locations:     [facility]):                                                    [phone]    [facility] ([place]):                                                   [phone]    [facility] ([place]):                   [phone]    _____If no [facility] appointment was made, please call one of the locations and schedule:    NP/PA appointment 14 days after hospital discharge if still in TCU    NP/PA appointment for 3-5 days after discharge from TCU    _____Have patient bring their med list and daily vitals/weight chart with them to appointment.    _____At discharge from the TCU give the patient the \"General Recommendations to Control Heart Failure When You Get Home  information for them to take home and their scale.  _____Upon discharge from the TCU reinforce the importance of home management of CHF including follow up in [facility].  What is the [facility]?  Cardiomyopathy  Optimization  Rehabilitation  Education  [facility] is a [facility].  It is an outpatient disease management program that is based on a phase-by-phase approach, which is tailored to each patient s individual needs.  The cardiologist, nurse practitioner, physician assistant and nurses provide an ongoing outpatient care and treatment plan that guides heart failure and cardiomyopathy patients from evaluation and education to stabilization. This team works with the current primary care doctor and cardiologist to help patients:    Avoid hospitalizations    Slow the progression of the disease    Improve length and quality of life    Know who and when to call if heart failure symptoms appear    Receive easy access to quality health care and " advice    Better understand your condition and treatment    Decrease the tremendous cost burden of heart failure care    Detect future heart problems before they become life threatening         Hospital Admission Date: 9/15/2017  KAYLA MADRIGAL   : 1935  Sex: Male        Attending Provider: Monalisa Koehler MD     Allergies:  Ciprofloxacin, Clindamycin Hcl, Hydrocodone, Tylenol With Codeine [Acetaminophen-codeine]    Infection:  None   Service:  HOSPITALIST    Ht:  --   Wt:  65.5 kg (144 lb 6.4 oz)   Admission Wt:  65 kg (143 lb 4.8 oz)    BMI:  --   BSA:  --            Patient PCP Information     Provider PCP Type    Chaparro Carroll MD General      ED Clinical Impression     Diagnosis Description Comment Added By Time Added    Pneumonia due to infectious organism, unspecified laterality, unspecified part of lung [J18.9] Pneumonia due to infectious organism, unspecified laterality, unspecified part of lung [J18.9]  Malick Mendoza MD 9/15/2017 10:49 PM    Acute respiratory failure with hypoxia (H) [J96.01] Acute respiratory failure with hypoxia (H) [J96.01]  Malick Mendoza MD 9/15/2017 10:49 PM    Severe sepsis (H) [A41.9, R65.20] Severe sepsis (H) [A41.9, R65.20]  Malick Mendoza MD 9/15/2017 10:49 PM    History of COPD [Z87.09] History of COPD [Z87.09]  Malick Mendoza MD 9/15/2017 10:49 PM    Acute kidney injury (H) [N17.9] Acute kidney injury (H) [N17.9]  Malick Mendoza MD 9/15/2017 10:49 PM    Abnormal urinalysis [R82.90] Abnormal urinalysis [R82.90]  Malick Mendoza MD 9/15/2017 11:28 PM      Hospital Problems as of 2017              Priority Class Noted POA    Pneumonia Medium  2017 Yes      Non-Hospital Problems as of 2017              Priority Class Noted    COPD (chronic obstructive pulmonary disease) (H) Medium  10/9/2012    Health Care Home Medium  2014    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 Medium  1/30/2015    Cachectic (H) Medium  10/13/2016      Code Status History     Date Active Date Inactive Code Status Order ID Comments User Context    9/22/2017 11:14 AM 9/23/2017  2:24 PM DNR/DNI [id number]  Jag Linda MD Inpatient    9/17/2017  7:55 AM 9/22/2017 11:14 AM Full Code [id number]  Mariano Singer DO Inpatient    9/16/2017  1:23 AM 9/17/2017  7:55 AM DNR/DNI [id number]  Monalisa Koehler MD Inpatient         Medication Review      START taking        Dose / Directions Comments    acetaminophen 650 MG Suppository   Commonly known as:  TYLENOL   Used for:  End of life care        Dose:  650 mg   Place 1 suppository (650 mg) rectally every 4 hours as needed for fever   Quantity:  60 suppository   Refills:  0        atropine 1 % Soln   Used for:  End of life care        Dose:  2 drop   Place 2 drops under the tongue every 2 hours as needed for secretions   Refills:  0        bisacodyl 10 MG Suppository   Commonly known as:  DULCOLAX   Used for:  End of life care        Dose:  10 mg   Place 1 suppository (10 mg) rectally daily as needed for constipation   Quantity:  25 suppository   Refills:  1        haloperidol 0.5 MG tablet   Commonly known as:  HALDOL   Used for:  End of life care        Dose:  0.5 mg   Take 1 tablet (0.5 mg) by mouth every 6 hours as needed for agitation   Refills:  0        LORazepam 0.5 MG tablet   Commonly known as:  ATIVAN   Used for:  End of life care        Dose:  0.25 mg   Take 0.5 tablets (0.25 mg) by mouth every 4 hours as needed for anxiety   Quantity:  60 tablet   Refills:  0        melatonin 3 MG tablet   Used for:  Acute respiratory failure with hypoxia (H)        Dose:  3 mg   Take 1 tablet (3 mg) by mouth nightly as needed   Refills:  0        mirtazapine 7.5 MG Tabs tablet   Commonly known as:  REMERON   Used for:  Insomnia, unspecified type        Dose:  7.5 mg   Take 1 tablet (7.5 mg) by mouth At Bedtime   Quantity:  60 tablet   Refills:   0        morphine sulfate HIGH CONCENTRATE 100 MG/5ML (HIGH CONC) solution   Commonly known as:  ROXANOL *CONCENTRATED*        Dose:  10 mg   Place 0.5 mLs (10 mg) under the tongue every 2 hours as needed for moderate to severe pain or other (or dyspnea)   Refills:  0        nicotine polacrilex 2 MG gum   Commonly known as:  NICORETTE   Used for:  Chronic obstructive pulmonary disease, unspecified COPD type (H)        Dose:  2 mg   Place 1 each (2 mg) inside cheek every hour as needed for smoking cessation   Quantity:  30 tablet   Refills:  0        senna 8.6 MG tablet   Commonly known as:  SENOKOT   Used for:  End of life care        Dose:  1 tablet   Take 1 tablet by mouth daily   Quantity:  120 tablet   Refills:  0          CONTINUE these medications which may have CHANGED, or have new prescriptions. If we are uncertain of the size of tablets/capsules you have at home, strength may be listed as something that might have changed.        Dose / Directions Comments    FLOMAX 0.4 MG capsule   This may have changed:  Another medication with the same name was removed. Continue taking this medication, and follow the directions you see here.   Generic drug:  tamsulosin        Dose:  0.4 mg   Take 0.4 mg by mouth once a week   Refills:  0        oxymetazoline 0.05 % spray   Commonly known as:  AFRIN   This may have changed:    - medication strength  - how much to take  - how to take this  - when to take this  - reasons to take this        Dose:  2 spray   Spray 2 sprays into both nostrils 2 times daily as needed for congestion   Refills:  0          CONTINUE these medications which have NOT CHANGED        Dose / Directions Comments    BREATHE RIGHT ADVANCED Strp   Used for:  Need for pneumococcal vaccination        daily   Refills:  0        fluticasone 50 MCG/ACT spray   Commonly known as:  FLONASE        Dose:  1 spray   Spray 1 spray into both nostrils daily as needed for rhinitis   Refills:  0        PULMICORT  FLEXHALER 180 MCG/ACT inhaler   Used for:  COPD (chronic obstructive pulmonary disease) (H)   Generic drug:  budesonide        TAKE 2 PUFF BY MOUTH TWICE DAILY   Quantity:  3 Inhaler   Refills:  2        SPIRIVA HANDIHALER 18 MCG capsule   Used for:  COPD (chronic obstructive pulmonary disease) (H)   Generic drug:  tiotropium        INHALE 1 CAPSULE BY MOUTH INTO THE LUNGS DAILY   Quantity:  90 capsule   Refills:  2        SUDAFED PO        Take by mouth every morning Dose unknown   Refills:  0                  Further instructions from your care team       [facility] [phone], Fax: [phone]    [facility]  [address] [address]  [phone]    Summary of Visit     Reason for your hospital stay       You were hospitalized for a pneumonia. You will discharged to a facility on hospice.             After Care     Activity - Up with nursing assistance           Advance Diet as Tolerated       Follow this diet upon discharge: Orders Placed This Encounter      Room Service      Combination Diet Regular Diet Adult       General info for SNF       Length of Stay Estimate: Long Term Care  Condition at Discharge: Terminal  Level of care:skilled   Rehabilitation Potential: Poor  Admission H&P remains valid and up-to-date: Yes  Recent Chemotherapy: N/A  Use Nursing Home Standing Orders: N/A       Mantoux instructions       Give two-step Mantoux (PPD) Per Facility Policy Yes             Procedures     Oxygen - Nasal cannula       1 Lpm by nasal cannula to keep O2 sats 92% or greater.             Follow-Up Appointment Instructions     Future Labs/Procedures    Follow Up     Comments:    Dr. Trujillo's surgery scheduler, Randee, has scheduled your TURBT bladder cancer procedure on 10/3/17 at 10:30am. Please allow her 3-4 business days to call you at home with pre-operative instructions. If you have not heard from her after a week, you can call Randee at [phone] to inquire about when  it will be scheduled.     [facility]  [address], [address]  [place]. [address]      Follow-Up Appointment Instructions     Follow Up       Dr. Trujillo's surgery scheduler, Randee, has scheduled your TURBT bladder cancer procedure on 10/3/17 at 10:30am. Please allow her 3-4 business days to call you at home with pre-operative instructions. If you have not heard from her after a week, you can call Randee at [phone] to inquire about when it will be scheduled.     [facility]  [address], [address]  [place]. [address]             Statement of Approval     Ordered          09/29/17 1331  I have reviewed and agree with all the recommendations and orders detailed in this document.  EFFECTIVE NOW     Approved and electronically signed by:  David Olivarez MD               General Recommendations To Control Heart Failure When You Get Home (Give at DC from TCU)       Heart Failure Instructions for Patients and Families: Please read and check off each of these important instructions as you do them when you get home.          Weight and Symptoms       ___ Put a scale in your bathroom.    ___ Post a weight chart or calendar next to your scale.    ___ Weigh yourself everyday as soon as you get up in the morning (before breakfast).  You should only be wearing your pajamas.  Write your weight on the chart/calendar.  ___ Bring your weight chart/calendar with you to all appointments.  ___ Call your doctor or nurse practitioner if you gain 2 pounds (in 1 day) or 5 pounds in (1 week) from your goal  good  weight.  Your good weight is also called your  dry  weight.  Your doctor or nurse will tell you what your good weight should be.    ___ Call your doctor or nurse practitioner if you have shortness of breath that gets worse over time, leg swelling or fatigue.       Medications and Diet       ___ Make sure to take your medication as prescribed.    ___ Bring a current list of  your medication and all of your medicine bottles with you to all appointments.    ___ Limit fluids if you still have swelling or shortness of breath, or if your doctor tells you to do so.    ___ Eat less than 2000 mg of sodium (salt) every day. Read food labels, and do not add salt to meals. Remember, if you eat less salt you retain less fluid.  ___ Follow a heart healthy diet that is low in saturated fat.        Activity and Suggested Lifestyle Changes      ___ Stay active. Talk to your doctor about an exercise program that is safe for your heart.  ___ Stop smoking. Reduce alcohol use.      ___ Lose weight if you are overweight. Extra weight puts a lot of stress on the heart.                 Control for Leg Swelling     ___ Keep your legs elevated (raised) as needed for swelling. If swelling is uncomfortable or elevation doesn t help, ask your doctor about using ACE wraps or support stockings.        What is the [facility]?  Cardiomyopathy  Optimization  Rehabilitation  Education    The [facility] is a heart failure specialty clinic within [facility].  It is an outpatient disease management program that is based on a phase-by-phase approach, which is tailored to each patient s individual needs.  The cardiologist, nurse practitioner, physician assistant and nurses provide an ongoing outpatient care and treatment plan that guides heart failure and cardiomyopathy patients from evaluation and education to stabilization. This team works with your current primary care doctor and cardiologist to help you:    - Avoid hospitalizations  - Slow the progression of the disease  - Improve length and quality of life  - Know who and when to call if heart failure symptoms appear  - Receive easy access to quality health care and advice  - Better understand your condition and treatment  - Decrease the tremendous cost burden of heart failure care  - Detect future heart problems before they become life  threatening                                 Follow-up Appointments     ___ An appointment with the [facility] may already have been made for you (see section   Your next appointments already scheduled ).  If you have a question about your appointment, would like to speak with a [facility] nurse, or would like to become a [facility] patient, please call one of the following locations:     - [facility]):                                             [phone]  - [facility]):                                            [phone]  - [facility] ([place]):                 [phone]      ___ Your [facility] Clinic Team will continue to educate you on your heart failure and may adjust medications based on your vital signs, lab work, and how you are feeling.  Therefore, it is very important to bring the following to all [facility] appointments:    - An accurate list of your medications  - Your medicine bottles  - Your weight chart/calendar

## 2017-09-15 NOTE — IP AVS SNAPSHOT
` [place] [address] ONCOLOGY: [phone]                                              INTERAGENCY TRANSFER FORM - NURSING   9/15/2017                    Hospital Admission Date: 9/15/2017  KAYLA MADRIGAL   : 1935  Sex: Male        Attending Provider: Monalisa Koehler MD     Allergies:  Ciprofloxacin, Clindamycin Hcl, Hydrocodone, Tylenol With Codeine [Acetaminophen-codeine]    Infection:  None   Service:  HOSPITALIST    Ht:  --   Wt:  65.5 kg (144 lb 6.4 oz)   Admission Wt:  65 kg (143 lb 4.8 oz)    BMI:  --   BSA:  --            Patient PCP Information     Provider PCP Type    Chaparro Carroll MD General      Current Code Status     Date Active Code Status Order ID Comments User Context       2017  2:24 PM DNR/DNI [id number] Code status discussion is appropriately documented in the chart. Jag Linda MD Inpatient       Code Status History     Date Active Date Inactive Code Status Order ID Comments User Context    2017 11:14 AM 2017  2:24 PM DNR/DNI [id number]  Jag Linda MD Inpatient    2017  7:55 AM 2017 11:14 AM Full Code [id number]  Mariano Singer DO Inpatient    2017  1:23 AM 2017  7:55 AM DNR/DNI [id number]  Monalisa Koehler MD Inpatient      Advance Directives        Does patient have a scanned Advance Directive/ACP document in EPIC?           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    Cachectic (H) Medium  10/13/2016      Immunizations     Name Date      Influenza (High Dose) 3 valent vaccine defer-17     Deferral:       Influenza (High Dose) 3 valent vaccine  12/09/15     Influenza (IIV3) 11/01/13     Influenza (IIV3) 09/26/11     Pneumococcal (PCV 13) 06/30/16     Pneumococcal 23 valent 07/20/17          END      ASSESSMENT     Discharge Profile Flowsheet     EXPECTED DISCHARGE     Other Resources  Transportation Services 09/29/17 1512    Expected Discharge Date  09/29/17 09/29/17 1512   Transportation Agency  [facility] 09/29/17 1512    DISCHARGE NEEDS ASSESSMENT     Transportation Contact Info  [phone] 09/29/17 1512    Concerns To Be Addressed  discharge planning concerns 09/29/17 1512   Transportation Status  Active 09/29/17 1512    Patient/family verbalizes understanding of discharge plan recommendations?  Yes 09/29/17 1512   Hospice  [facility] [phone], Fax: [phone] 09/29/17 1512    Medical Team notified of plan?  yes 09/29/17 1512   Skilled Nursing Facility  [facility] [phone], Fax: [phone] 09/29/17 1512    Anticipated Changes Related to Illness  inability to care for self 09/29/17 1512   PAS Number  [id number] 09/29/17 1512    Equipment Currently Used at Home  cane, straight (Uses for longer distances, uses walls within home) 09/21/17 1152   Senior Linkage Line Referral Placed  09/29/17 09/29/17 1512    Transportation Available  none 09/21/17 1152   SKIN      # of Referrals Placed by CTS  Post Acute Facilities 09/28/17 1530   Inspection of bony prominences  Full 09/29/17 1247    GASTROINTESTINAL (ADULT,PEDIATRIC,OB)     Skin WDL  ex 09/29/17 0943    GI WDL  ex (deferred) 09/29/17 0943   Skin Temperature  warm 09/28/17 1620    Abdominal Appearance  concave 09/25/17 1210   Skin Integrity  pressure ulcer(s) (coccyx) 09/29/17 0943    All Quadrants Bowel Sounds  audible and normoactive 09/25/17 1210   Full except areas not inspected   Hip, left;Hip, right;Buttock, left;Buttock, right;Sacrum;Coccyx 09/22/17 0433    Last Bowel Movement  09/28/17 09/28/17 1620   Skin Color/Characteristics  pale;alec;other (see comments);redness  "blanchable (alec BLE; red groin; powder applied) 09/29/17 1247    GI Signs/Symptoms  fecal incontinence 09/28/17 1620   Skin Moisture  dry 09/28/17 1119    Passing flatus  yes 09/28/17 1620   Skin Elasticity  slow return to original state 09/28/17 1620    COMMUNICATION ASSESSMENT     Additional Documentation  -- (mepilex CDI) 09/23/17 1429    Patient's communication style  spoken language (English or Bilingual) 09/15/17 2147   SAFETY      FINAL RESOURCES     Safety WDL  WDL 09/29/17 0943    Resources List  Skilled Nursing Facility;Hospice 09/29/17 1512   All Alarms  alarm(s) activated and audible 09/29/17 0943                 Assessment WDL (Within Defined Limits) Definitions           Safety WDL     Effective: 09/28/15    Row Information: <b>WDL Definition:</b> Bed in low position, wheels locked; call light in reach; upper side rails up x 2; ID band on<br> <font color=\"gray\"><i>Item=AS safety wdl>>List=AS safety wdl>>Version=F14</i></font>      Skin WDL     Effective: 09/28/15    Row Information: <b>WDL Definition:</b> Warm; dry; intact; elastic; without discoloration; pressure points without redness<br> <font color=\"gray\"><i>Item=AS skin wdl>>List=AS skin wdl>>Version=F14</i></font>      Vitals     Vital Signs Flowsheet     VITAL SIGNS     Side Effects Monitoring: Respiratory Depth  N 09/29/17 1427    Temp  98.7  F (37.1  C) 09/29/17 1554   Side Effects Monitoring: Sedation Level  1 09/29/17 1427    Temp src  Oral 09/29/17 1554   HEIGHT AND WEIGHT      Resp  16 09/29/17 1554   Weight  65.5 kg (144 lb 6.4 oz) 09/23/17 0537    Pulse  71 09/23/17 1705   Weight Method  Bed scale 09/23/17 0537    Heart Rate  91 09/29/17 1554   Bed Scale  Standard (fitted sheet, draw sheet/ pad, cover/flat sheet, blanket, two pillows) 09/23/17 0537    Pulse/Heart Rate Source  Monitor 09/29/17 1554   VIMAL COMA SCALE      BP  103/62 09/29/17 1554   Best Eye Response  4-->(E4) spontaneous 09/17/17 1041    BP Location  Right arm " 09/29/17 1554   Best Motor Response  6-->(M6) obeys commands 09/17/17 1041    OXYGEN THERAPY     Best Verbal Response  5-->(V5) oriented 09/17/17 1041    SpO2  92 % 09/29/17 1554   Nicci Coma Scale Score  15 09/17/17 1041    O2 Device  Nasal cannula 09/29/17 1554   POSITIONING      FiO2 (%)  21 % 09/21/17 1021   Body Position  side-lying, right 09/29/17 1228    Oxygen Delivery  3 LPM 09/29/17 1554   Head of Bed (HOB)  HOB at 30-45 degrees 09/29/17 1228    PAIN/COMFORT     Positioning/Transfer Devices  pillows;in use 09/29/17 1228    Patient Currently in Pain  denies 09/29/17 0917   Chair  Shifted weight 09/23/17 0811    Preferred Pain Scale  number (Numeric Rating Pain Scale) 09/28/17 2259   DAILY CARE      Patient's Stated Pain Goal  No pain 09/23/17 0810   Activity Management  activity adjusted per tolerance 09/29/17 0943    0-10 Pain Scale  -- (lung/chest discomfort) 09/29/17 1206   Activity Assistance Provided  assistance, 2 people 09/29/17 0943    Pain Location  Chest 09/28/17 2259   Assistive Device Utilized  gait belt;walker 09/28/17 1620    Pain Orientation  Mid 09/23/17 0810   ECG      Pain Descriptors  Aching 09/28/17 2259   Equipment  electrodes changed 09/18/17 1735    Pain Intervention(s)  Medication (See eMAR) 09/28/17 2259   ECG Rhythm  Sinus rhythm 09/22/17 0304    Response to Interventions  Decrease in pain 09/29/17 1427   Ectopy  PAC 09/22/17 0304    ANALGESIA SIDE EFFECTS MONITORING     Ectopy Frequency  Frequent 09/22/17 0304    Side Effects Monitoring: Respiratory Quality  R 09/29/17 1427                 Patient Lines/Drains/Airways Status    Active LINES/DRAINS/AIRWAYS     Name: Placement date: Placement time: Site: Days: Last dressing change:    Wound 09/18/17 Coccyx redness, blanchable 09/18/17   0900   Coccyx   11     Wound 09/19/17 Mid Back Abrasion(s) 09/19/17   0930   Back   10             Patient Lines/Drains/Airways Status    Active PICC/CVC     None            Intake/Output Detail  Report     Date Intake     Output Net    Shift P.O. I.V. IV Piggyback Total Urine Total       Day 09/28/17 0700 - 09/28/17 1459 -- -- -- -- 475 475 -475    Fabi 09/28/17 1500 - 09/28/17 2259 -- -- -- -- 200 200 -200    Noc 09/28/17 2300 - 09/29/17 0659 -- -- -- -- 700 700 -700    Day 09/29/17 0700 - 09/29/17 1459 20 -- -- 20 225 225 -205    Fabi 09/29/17 1500 - 09/29/17 2259 -- -- -- -- -- -- 0      Last Void/BM       Most Recent Value    Urine Occurrence 1 at 09/29/2017 0124    Stool Occurrence 1 at 09/28/2017 1203      Case Management/Discharge Planning     Case Management/Discharge Planning Flowsheet     REFERRAL INFORMATION     ASSESSMENT/CONCERNS TO BE ADDRESSED      Did the Initial Social Work Assessment result in a Social Work Case?  Yes 09/28/17 1530   Concerns To Be Addressed  discharge planning concerns 09/29/17 1512    Admission Type  inpatient 09/28/17 1530   DISCHARGE PLANNING      Arrived From  home or self-care 09/28/17 1530   Patient/family verbalizes understanding of discharge plan recommendations?  Yes 09/29/17 1512    Referral Source  physician 09/28/17 1530   Medical Team notified of plan?  yes 09/29/17 1512    # of Referrals Placed by CTS  Post Acute Facilities 09/28/17 1530   Anticipated Changes Related to Illness  inability to care for self 09/29/17 1512    Reason For Consult  discharge planning;end of life/hospice 09/28/17 1530   Transportation Available  none 09/21/17 1152    Record Reviewed  medical record 09/28/17 1530   PATIENT PLACEMENT INFORMATION      CTS Assigned to Case  Shonda AguilaSocoMadeleine, LSW 09/29/17 1512   Did the patient choose [facility]?  Yes 09/29/17 1512    Primary Care MD Name  Dr. Chaparro Carroll 09/22/17 1209   Placement Choice Reason  [facility] 09/29/17 1512    LIVING ENVIRONMENT     Did [facility] accept the patient?  Yes 09/29/17 1512    Lives With  alone 09/22/17 1235   FINAL NOTE      Living Arrangements  house 09/22/17 1235   Final Note  Patient is to d/c  to [facility] via stretcehr at 1830 on 9/29 with [facility] 09/29/17 1512    Provides Primary Care For  no one 09/22/17 1235   FINAL RESOURCES      Quality Of Family Relationships  supportive;involved 09/22/17 1235   Equipment Currently Used at Home  cane, straight (Uses for longer distances, uses walls within home) 09/21/17 1152    HOME SAFETY     Resources List  Skilled Nursing Facility;Hospice 09/29/17 1512    Patient Feels Safe Living in Home?  yes 09/22/17 1235   Other Resources  Transportation Services 09/29/17 1512    ASSESSMENT OF FAMILY/SOCIAL SUPPORT     Transportation Agency  [facility] 09/29/17 1512    Marital Status   09/22/17 1236   Transportation Contact Info  [phone] 09/29/17 1512    Who is your support system?  Sibling(s) 09/22/17 1236   Transportation Status  Active 09/29/17 1512    Description of Support System  Uninvolved 09/22/17 1442   Hospice  [facility] [phone], Fax: [phone] 09/29/17 1512    Support Assessment  Adequate family and caregiver support 09/22/17 1236   Skilled Nursing Facility  [facility] [phone], Fax: [phone] 09/29/17 1512    Quality of Family Relationships  non-evident 09/22/17 1442   PAS Number  [phone] 09/29/17 1512    Communication preferences  phone 09/22/17 1236   Senior Linkage Line Referral Placed  09/29/17 09/29/17 1512    COPING/STRESS     ABUSE RISK SCREEN      Major Change/Loss/Stressor  hospitalization 09/22/17 1236   QUESTION TO PATIENT:  Has a member of your family or a partner(now or in the past) intimidated, hurt, manipulated, or controlled you in any way?  no 09/15/17 2158    Patient Personal Strengths  able to adapt;expressive of needs 09/22/17 1236   QUESTION TO PATIENT: Do you feel safe going back to the place where you are living?  yes 09/15/17 2158    Sources Of Support  other (see comments) (nephew) 09/22/17 1442   OBSERVATION: Is there reason to believe there has been maltreatment of a vulnerable  adult (ie. Physical/Sexual/Emotional abuse, self neglect, lack of adequate food, shelter, medical care, or financial exploitation)?  no 09/15/17 2158    Reaction To Health Status  accepting 09/22/17 1236   (R) MENTAL HEALTH SUICIDE RISK      Understanding Of Condition And Treatment  adequate understanding of medical condition 09/22/17 1236   Are you depressed or being treated for depression?  No 09/16/17 1726    EXPECTED DISCHARGE     HOMICIDE RISK      Expected Discharge Date  09/29/17 09/29/17 1512   Feels Like Hurting Others  no 09/15/17 1273

## 2017-09-15 NOTE — IP AVS SNAPSHOT
"` `     [place] [address] ONCOLOGY: [phone]                 INTERAGENCY TRANSFER FORM - NOTES (H&P, Discharge Summary, Consults, Procedures, Therapies)   9/15/2017                    Hospital Admission Date: 9/15/2017  KAYLA MADRIGAL   : 1935  Sex: Male        Patient PCP Information     Provider PCP Type    Chaparro Carroll MD General         History & Physicals      H&P signed by Monalisa Koehler MD at 2017  9:18 PM      Author:  Monalisa Koehler MD Service:  Hospitalist Author Type:  Physician    Filed:  2017  9:18 PM Date of Service:  2017  1:38 AM Creation Time:  2017  3:31 AM    Status:  Signed :  Monalisa Koehler MD (Physician)         PRIMARY CARE PHYSICIAN:  Chaparro Carroll MD      CHIEF COMPLAINT:  Generalized weakness.      HISTORY OF PRESENT ILLNESS:  Kayla Madrigal is an 82-year-old gentleman who lives alone, and called 911 today due to progressive generalized weakness which has been coming on gradually, in his words, for \"years.\"  Unfortunately, he is not very forthcoming in information, is resistant to answering all of the questions, repeatedly stating, \"It's on my list.\"  He has a list of clinic and doctor visits that I reviewed as well as discussed with the patient.  Basically, he has been using a walker at home, able to get around, but it has been more and more difficult.  He has had significant weight loss in the last several years, but it has stabilized more recently.  Weight was 155 in , 149 in , and 136 in 2016, and has remained in the 130s.  He has chronic shortness of breath with exertion, and he states this has not changed.  He also has a chronic cough that is productive of yellow sputum which he states has not changed.  He has chronic pain in his chest as well with coughing and deep breathing.  Again, he states there has been no recent change in this.  He denies any acute changes.      However, in the ED, multiple abnormalities were " found which appear acute in nature.  He was afebrile.  His heart rate was slightly elevated at 108.  Initial blood pressure was 86/61, respirations 24, and oxygenation was 93% on 3 liters.  His labs are significant for an elevated creatinine of 1.97, which is up from 0.71 on 07/20.  His sodium and potassium were normal at 139 and 3.9, and BUN was up at 85.  His white blood cell count was 15.7.  Urinalysis was not that exciting, with 13 white blood cells and 7 RBC, and he denies any urinary symptoms at this time.  His lactic acid was 2.2.  Procalcitonin was 5.22.  VBG reveals pH of 7.43, CO2 of 34, O2 of 64.      His chest x-ray reveals an extensive opacity of the left lower lobe, but otherwise, lungs are clear without any evidence of pleural effusion or edema.      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  It appears he was supposed to follow up for that, but has not yet done so.  He has an appointment with Dr. Trujillo in the next couple of months.      The rest of his 10-point review of systems was unremarkable, and again he denies any acute symptoms bringing him in, just generalized weakness.  It should also be noted that when the EMTs went to go to his house, he had multiple sticky notes around the house with things that stated future appointments, and also a reminder to breathe deeply, but when the patient is asked questions of orientation, he is able to answer everything but the day of the week, stating that he no longer keeps track of this.  Otherwise he appears oriented.      PAST MEDICAL HISTORY:   1.  Chronic obstructive pulmonary disease.   2.  Cachexia, as outlined above.   3.  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   4.  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  It looks like this was discovered in 07/2016, and he has yet to follow up and have this removed.   5.  I suspect  he has benign prostatic hypertrophy, as he is on Flomax.      PAST SURGICAL HISTORY:  Status post bilateral total knee arthroplasty.      SOCIAL HISTORY:  He lives alone.  He quit smoking in 2007.  He now chews nicotine gum.  He drinks about one can of beer per week, no more.      FAMILY HISTORY:  The patient was resistant to all of my questioning, and nothing is on file.      ALLERGIES:  Cipro and clindamycin caused unknown reactions.  There is a reported intolerance to hydrocodone, but it appears that this just did not help.  Tylenol with codeine, he thinks, made him pass out.      MEDICATIONS:[MM1.1]   Prior to Admission Medications   Prescriptions Last Dose Informant Patient Reported? Taking?   Nasal Dilators (BREATHE RIGHT ADVANCED) STRP   Yes No   Sig: daily   Oxymetazoline HCl (AFRIN 12 HOUR NA)   Yes Yes   Sig: Spray in nostril twice a week    PULMICORT FLEXHALER 180 MCG/ACT inhaler   No Yes   Sig: TAKE 2 PUFF BY MOUTH TWICE DAILY   Pseudoephedrine HCl (SUDAFED PO)   Yes Yes   Sig: Take by mouth every morning Dose unknown   SPIRIVA HANDIHALER 18 MCG inhalation capsule   No Yes   Sig: INHALE 1 CAPSULE BY MOUTH INTO THE LUNGS DAILY   fluticasone (FLONASE) 50 MCG/ACT spray prn  Yes Yes   Sig: Spray 1 spray into both nostrils daily as needed for rhinitis    tamsulosin (FLOMAX) 0.4 MG capsule Past Month at Unknown time  Yes Yes   Sig: Take 0.4 mg by mouth once a week      Facility-Administered Medications: None[MM1.2]          REVIEW OF SYSTEMS:  A complete 10-point review of systems was performed and is unremarkable except for what I have mentioned above.      PHYSICAL EXAM:   VITAL SIGNS:  His blood pressure is currently 117/64, temperature 97.4, respiratory rate 24, oxygenation 97% on 3 liters.   GENERAL:  He appears cachectic but in no acute distress.   NEUROPSYCH:  His is alert.  He is oriented x3.  Although he is resistant to giving his history, he gives appropriate answers.  His face is symmetric.  Cranial  nerves II-XII are grossly intact.  He is moving all four extremities without gross focal neurological deficit.   HEENT:  Sclerae nonicteric, noninjected.  His oropharynx is dry without erythema or swelling.   NECK:  Supple without lymphadenopathy or thyromegaly.   HEART:  Regular without murmur, gallop or rub.   LUNGS:  Decreased breath sounds bilaterally, more pronounced on the left than on the right.  There are no wheezes or rhonchi or crackles.   ABDOMEN:  Soft.  I do palpate an induration in the mid abdomen, suggesting a possible mass.  Otherwise, no other masses or hepatosplenomegaly are noted.  Bowel sounds are of normal pitch and frequency. Right inguinal hernia.   EXTREMITIES:  Warm without edema.   SKIN:  No acute rashes, ecchymoses or petechiae.   JOINTS:  No swelling or warmth.      LABS AND OTHER STUDIES:  As above in HPI.      ASSESSMENT:  Bong Noguera is an 82-year-old man with history of COPD, and a bladder tumor which appeared to be papillary bladder cancer noted in 07/2016 by Dr. Trujillo.  He has had no follow-up procedure for this.  He presents today just due to generalized weakness, and was found to be hypoxic, initially in the mid-80s on room air. Chest x-ray reveals diffuse extensive airspace opacity throughout the left lower lung.   His white cell count is elevated at 15.7.  Creatinine is elevated at 1.97, with last creatinine being 0.71 in July.  His lactic acid is mildly elevated at 2.2.  He is afebrile, but his heart rate was up a little bit at 186, and blood pressure was 86/61, and this came up with fluid.       PLAN:   1.  Acute hypoxic respiratory failure with sepsis secondary to left lower lobe pneumonia:  In the ED, he received a 1 liter bolus of IV fluids, and was started on ceftriaxone and azithromycin.  I am going to give him another 500 cc of fluids, and will continue him on normal saline at 100 cc per hour, and continue both ceftriaxone and azithromycin.  Sputum culture has been  ordered as well as Strep pneumo and urine antigen.  Will have guaifenesin for cough.  Will continue his prior to admission Spiriva and Pulmicort inhaler.  He does not sound to have acute obstruction or COPD exacerbation on exam.   2.  Acute renal failure secondary to dehydration and sepsis:  As above, he received 1 liter of fluid.  I am going to give him another 500 cc.  We will follow up his basic metabolic panel in the morning.   3.  Bladder tumor, suspicious for papillary bladder cancer, noted a year ago by Dr. Trujillo.  He has not yet had follow-up for this, and it is worrisome, as I palpate a mass  in his mid-abdomen, and he has had progressive failure to thrive and weakness.  I am going to consult Urology for their input of the likelihood of him suffering metastatic disease.  I would consider getting a CT of the chest, abdomen and pelvis prior to discharge, depending on their take on this.  Otherwise, this can be done as an outpatient.   4.  Benign prostatic hypertrophy:  We will continue his prior to admission Flomax.   5.  Chronic rhinitis:  We will continue his Sudafed.  I have not ordered his Afrin which he takes twice weekly.   6.  DVT prophylaxis:  He will be on Lovenox subq daily.   7.  CODE STATUS:  Discussed with the patient, and he is clear that he does not want resuscitation attempts, and would like to be DNR/DNI.   8.  Disposition:  He will be admitted as an inpatient.  I anticipate he will be here for at least two nights.         ELIZABETH GAVIRIA MD             D: 2017 01:38   T: 2017 03:31   MT: [id number]      Name:     KAYLA MADRIGAL   MRN:      [id number]        Account:      [id number]   :      1935           Admitted:     [id number]      Document: [id number]       cc: Chaparro Trujillo MD[MM1.1]        Revision History        User Key Date/Time User Provider Type Action    > [N/A] 2017  9:18 PM Elizabeth Gaviria MD Physician Sign     MM1.1 2017   9:18 PM Monalisa Koehler MD Physician Edit     MM1.2 9/16/2017  9:14 PM Monalisa Koehler MD Physician Edit     [N/A] 9/16/2017  3:31 AM Monalisa Koehler MD Physician Edit                     Discharge Summaries      Discharge Summaries by David Olivarez MD at 9/29/2017  1:32 PM     Author:  David Olivarez MD Service:  Hospitalist Author Type:  Physician    Filed:  9/29/2017  5:33 PM Date of Service:  9/29/2017  1:32 PM Creation Time:  9/29/2017  1:13 PM    Status:  Signed :  David Olivarez MD (Physician)         [facility]    Discharge Summary  Hospitalist    Date of Admission:  9/15/2017  Date of Discharge:[SF1.1]  9/29/2017[SF1.2]  Discharging Provider:[SF1.1] David Olivarez[SF1.3], MD[SF1.1]    Discharge Diagnoses[SF1.3]   Comfort end of life cares  Acute hypoxemic respiratory failure  LLL PNA  COPD exacerbation  New congestive heart failure, demand ischemia  ROBINSON resolved  AAA  Bladder mass[SF1.4]    History of Present Illness[SF1.3]   Bong Noguera is a 82 year old male with PMH COPD, suspected bladder cancer, BPH who was admitted on 9/15/2017 with acute hypoxic respiratory failure and severe sepsis. FTH elevated troponins and possible acute systolic CHF exacerbation. Following nonimprovement on initial treatment, goals of care discussion was conducted, and patient elected to be transitioned to comfort cares. Will discharge to SNF as a hosp[facility] patient[facility].[SF1.4]    Hospital Course[SF1.3]   Bong Noguera was admitted on 9/15/2017.  The following problems were addressed during his hospitalization:[SF1.1]    Goals of care: During this admission patient has refused PEG placement, aggressive measures, and has indicated that he prefers comfort cares measures. He has refused PT evaluation and has not been fully evaluated for TCU. Has failed swallow study with significant aspiration risk. Patient has acknowledged his aspiration risk and has decided  to continue eating. Nephew is POA and has been helping patient with this transition process. Palliative consulted, now signed off.     Acute hypoxemic respiratory failure  LLL PNA  COPD exacerbation  On admission noted hypotension, elevated lactate, elevated procalcitonin with large LLL infiltrate, and increased O2 reqs on HF. Noted WBC increase on 9/22 to 24.3k. Treated with a total 10 day course starting with Ceft/Azithro -> Zosyn, ending 9/24/17. Given IV steroids for possible COPD component. On 9/25 patient appears symptomatically improved however no further evaluations given comfort cares approach. Sputum cx with Candida, likely contaminant, Bcx NGTD.   - Continue Fluticasone and Spiriva     New congestive heart failure, demand ischemia: Elevated troponins in the setting of sepsis. TTE showed EF 30-35% and inferoapical akinesis which is new. Cardiology consulted and have recommended medication management.    ROBINSON, resolved.    AAA 7cm: Further treatment deferred    Bladder mass: Dx in 2016 but reportedly unable to make appt for TURBT. Hematuria was reported at home. Further evaluation stopped due to comfort cares.  - Cont Tamsulosin[SF1.4]    Medications recommendations for hospice:  Morphine sulfate 20mg/ml give 10mg or 0.5ml SL every 2 hrs as needed for pain/dyspnea  Lorazepam 0.25mg PO/SL every 4 hours PRN anxiety/restlessness  Haloperidol 0.5mg PO/SL every 6 hours PRN agitation/nausea  Atropine 1% 2 drops PO/SL every 2 hours PRN terminal congestion  Bisacodyl supp 10mg NV daily PRN constipation  Acetaminophen supp 650mg NV every 4 hours PRN fever/mild pain  Senna 8.6mg 1 tab po 2x day as needed for constipation  Thank you.Donna Bishop RN, BSN  [facility] Admission nurse  [phone][SF1.1]    Active Problems:    Pneumonia[SF1.3]      David Olivarez MD[SF1.1]    Significant Results and Procedures[SF1.3]   Echo 9/17[SF1.1]    Pending Results[SF1.3]   none[SF1.1]  Unresulted Labs Ordered in the Past 30  Days of this Admission     No orders found from 7/17/2017 to 9/16/2017.          Code Status[SF1.3]   DNR / DNI[SF1.1]       Primary Care Physician   Chaparro Carroll    Physical Exam   Temp: 98.7  F (37.1  C) Temp src: Oral BP: 103/62   Heart Rate: 91 Resp: 16 SpO2: 92 % O2 Device: Nasal cannula Oxygen Delivery: 3 LPM  Vitals:    09/22/17 0658 09/23/17 0536   Weight: 65 kg (143 lb 4.8 oz) 65.5 kg (144 lb 6.4 oz)[SF1.3]     Vital Signs with Ranges[SF1.1]  Temp:  [98.7  F (37.1  C)] 98.7  F (37.1  C)  Heart Rate:  [91] 91  Resp:  [16-18] 16  BP: (103)/(62) 103/62  SpO2:  [92 %-94 %] 92 %  I/O last 3 completed shifts:  In: 20 [P.O.:20]  Out: 1125 [Urine:1125][SF1.3]    Constitutional: Male in NAD, no respiratory distress, cachectic  Eyes: PERRL, nonicteric, normal ocular movements  HEENT: Normocephalic, atraumatic, oral mucosa moist  Neurologic: A&Ox3  Psychiatric: Appropriate affect and mood[SF1.1]    Discharge Disposition[SF1.3]   Discharged to long-term care facility[SF1.4]  Condition at discharge:[SF1.1] Stable[SF1.4]    Consultations This Hospital Stay   UROLOGY IP CONSULT  PHYSICAL THERAPY ADULT IP CONSULT  OCCUPATIONAL THERAPY ADULT IP CONSULT  NUTRITION SERVICES ADULT IP CONSULT  SPEECH LANGUAGE PATH ADULT IP CONSULT  CARDIOLOGY IP CONSULT  VASCULAR SURGERY IP CONSULT  [place] VASCULAR MEDICINE IP CONSULT  WOUND OSTOMY CONTINENCE NURSE  IP CONSULT  NUTRITION SERVICES ADULT IP CONSULT  PALLIATIVE CARE ADULT IP CONSULT  SOCIAL WORK IP CONSULT  OCCUPATIONAL THERAPY ADULT IP CONSULT  PSYCHOLOGY ADULT IP CONSULT  PSYCHIATRY IP CONSULT    Time Spent on this Encounter[SF1.3]   IDavid, personally saw the patient today and spent less than or equal to 30 minutes discharging this patient.[SF1.4]    Discharge Orders     Follow Up   Dr. Trujillo's surgery scheduler, Randee, has scheduled your TURBT bladder cancer procedure on 10/3/17 at 10:30am. Please allow her 3-4 business days to call you at home with  pre-operative instructions. If you have not heard from her after a week, you can call Randee at [phone] to inquire about when it will be scheduled.     [facility]  [address], [address]  [place]. [address]     General info for SNF   Length of Stay Estimate: Long Term Care  Condition at Discharge: Terminal  Level of care:skilled   Rehabilitation Potential: Poor  Admission H&P remains valid and up-to-date: Yes  Recent Chemotherapy: N/A  Use Nursing Home Standing Orders: N/A     Mantoux instructions   Give two-step Mantoux (PPD) Per Facility Policy Yes     Reason for your hospital stay   You were hospitalized for a pneumonia. You will discharged to a facility on hospice.     Activity - Up with nursing assistance     Oxygen - Nasal cannula   1 Lpm by nasal cannula to keep O2 sats 92% or greater.     Advance Diet as Tolerated   Follow this diet upon discharge: Orders Placed This Encounter     Room Service     Combination Diet Regular Diet Adult       Discharge Medications   Current Discharge Medication List      START taking these medications    Details   melatonin 3 MG tablet Take 1 tablet (3 mg) by mouth nightly as needed    Associated Diagnoses: Pneumonia due to infectious organism, unspecified laterality, unspecified part of lung; Acute respiratory failure with hypoxia (H)      morphine sulfate HIGH CONCENTRATE (ROXANOL *CONCENTRATED*) 100 MG/5ML (HIGH CONC) solution Place 0.5 mLs (10 mg) under the tongue every 2 hours as needed for moderate to severe pain or other (or dyspnea)  Refills: 0    Associated Diagnoses: Pneumonia due to infectious organism, unspecified laterality, unspecified part of lung      mirtazapine (REMERON) 7.5 MG TABS tablet Take 1 tablet (7.5 mg) by mouth At Bedtime  Qty: 60 tablet    Associated Diagnoses: Insomnia, unspecified type      nicotine polacrilex (NICORETTE) 2 MG gum Place 1 each (2 mg) inside cheek every hour as needed for smoking cessation  Qty: 30  tablet    Associated Diagnoses: Chronic obstructive pulmonary disease, unspecified COPD type (H)      LORazepam (ATIVAN) 0.5 MG tablet Take 0.5 tablets (0.25 mg) by mouth every 4 hours as needed for anxiety  Qty: 60 tablet    Associated Diagnoses: End of life care      haloperidol (HALDOL) 0.5 MG tablet Take 1 tablet (0.5 mg) by mouth every 6 hours as needed for agitation    Associated Diagnoses: End of life care      bisacodyl (DULCOLAX) 10 MG Suppository Place 1 suppository (10 mg) rectally daily as needed for constipation  Qty: 25 suppository, Refills: 1    Associated Diagnoses: End of life care      senna (SENOKOT) 8.6 MG tablet Take 1 tablet by mouth daily  Qty: 120 tablet    Associated Diagnoses: End of life care      acetaminophen (TYLENOL) 650 MG Suppository Place 1 suppository (650 mg) rectally every 4 hours as needed for fever  Qty: 60 suppository    Associated Diagnoses: End of life care      atropine 1 % SOLN Place 2 drops under the tongue every 2 hours as needed for secretions    Associated Diagnoses: End of life care         CONTINUE these medications which have CHANGED    Details   oxymetazoline (AFRIN) 0.05 % spray Spray 2 sprays into both nostrils 2 times daily as needed for congestion    Associated Diagnoses: Pneumonia due to infectious organism, unspecified laterality, unspecified part of lung         CONTINUE these medications which have NOT CHANGED    Details   tamsulosin (FLOMAX) 0.4 MG capsule Take 0.4 mg by mouth once a week      fluticasone (FLONASE) 50 MCG/ACT spray Spray 1 spray into both nostrils daily as needed for rhinitis       Pseudoephedrine HCl (SUDAFED PO) Take by mouth every morning Dose unknown      PULMICORT FLEXHALER 180 MCG/ACT inhaler TAKE 2 PUFF BY MOUTH TWICE DAILY  Qty: 3 Inhaler, Refills: 2    Associated Diagnoses: COPD (chronic obstructive pulmonary disease) (H)      SPIRIVA HANDIHALER 18 MCG inhalation capsule INHALE 1 CAPSULE BY MOUTH INTO THE LUNGS DAILY  Qty: 90  capsule, Refills: 2    Associated Diagnoses: COPD (chronic obstructive pulmonary disease) (H)      Nasal Dilators (BREATHE RIGHT ADVANCED) STRP daily    Associated Diagnoses: Need for pneumococcal vaccination           Allergies   Allergies   Allergen Reactions     Ciprofloxacin      Clindamycin Hcl      Hydrocodone      Was no help in the past     Tylenol With Codeine [Acetaminophen-Codeine]      He things he passed out with this     Data[SF1.3]   Most Recent 3 CBC's:[SF1.4]  Recent Labs   Lab Test  09/22/17   0717  09/21/17   0510  09/20/17   0845   WBC  24.3*  17.0*  24.7*   HGB  14.2  14.7  13.6   MCV  97  97  97   PLT  405  315  290[SF1.3]      Most Recent 3 BMP's:[SF1.4]  Recent Labs   Lab Test  09/22/17   0717  09/21/17   0510  09/20/17   1650  09/19/17   0310   09/17/17   0340   NA  141  140   --    --    --   139   POTASSIUM  3.8  3.9  3.2*   --    < >  3.4   CHLORIDE  100  101   --    --    --   107   CO2  34*  33*   --    --    --   24   BUN  52*  38*   --    --    --   50*   CR  0.76  0.72   --   0.66   --   0.94   ANIONGAP  7  6   --    --    --   8   LETHA  8.0*  7.8*   --    --    --   8.2*   GLC  144*  148*   --    --    --   146*    < > = values in this interval not displayed.[SF1.3]     Most Recent 2 LFT's:[SF1.4]  Recent Labs   Lab Test  09/21/17   0510  09/15/17   2149   AST  35  20   ALT  29  15   ALKPHOS  126  93   BILITOTAL  0.4  0.8[SF1.3]     Most Recent INR's and Anticoagulation Dosing History:  Anticoagulation Dose History     There is no flowsheet data to display.        Most Recent 3 Troponin's:[SF1.4]  Recent Labs   Lab Test  09/17/17   1214  09/17/17   0617  09/17/17   0340   TROPI  0.567*  0.972*  0.470*[SF1.3]     Most Recent Cholesterol Panel:[SF1.4]  Recent Labs   Lab Test  09/18/17   0845   CHOL  58   LDL  26   HDL  13*   TRIG  97[SF1.3]     Most Recent 6 Bacteria Isolates From Any Culture (See EPIC Reports for Culture Details):[SF1.4]  Recent Labs   Lab Test  09/20/17   1231   "09/20/17   1220  09/16/17   1850  09/16/17   0408  09/15/17   2252  09/15/17   2242   CULT  No growth  No growth  Light growth  Candida albicans / dubliniensis  Candida albicans and Candida dubliniensis are not routinely speciated  Susceptibility testing not routinely done  *  >10 Squamous epithelial cells/low power field indicates oral contamination. Please   recollect.  *  Canceled, Test credited  Notification of test cancellation was given to  Victorina Owens RN [id number], @5773 9/16/17.DH.    No growth  No growth[SF1.3]     Most Recent TSH, T4 and A1c Labs:[SF1.4]  Recent Labs   Lab Test  05/30/17   1620  02/09/16   1215   T4  1.33   --    A1C   --   5.8*[SF1.3]          Revision History        User Key Date/Time User Provider Type Action    > SF1.3 9/29/2017  5:33 PM David Olivarez MD Physician Sign     SF1.4 9/29/2017  5:30 PM David Olivarez MD Physician      SF1.2 9/29/2017  2:26 PM David Olivarez MD Physician      SF1.1 9/29/2017  1:13 PM David Olivarez MD Physician                      Consult Notes      Consults signed by King Garcia MD at 9/28/2017  8:35 AM      Author:  King Garcia MD Service:  Psychiatry Author Type:  Physician    Filed:  9/28/2017  8:35 AM Date of Service:  9/28/2017  7:33 AM Creation Time:  9/28/2017  7:58 AM    Status:  Signed :  King Garcia MD (Physician)         DATE OF SERVICE:  09/28/2017      REQUESTING PHYSICIAN:  Dr. Lugo.        REASON FOR CONSULTATION:  \"The patient is tired of being in the hospital and appears stressed.\"      IDENTIFYING DATA:  Mr. Bong Noguera is an 82-year-old   male admitted with generalized weakness.  He lives alone and has multiple medical problems, called 911 due to the fact that he has been unable to take care of himself.      CHIEF COMPLAINT:  \"I guess I'm just tired of being in the hospital.\"        HISTORY OF PRESENT ILLNESS:  This is an 82-year-old gentleman " "who was admitted 09/16/2017 with failure to thrive, weight loss and weakness.  He lives alone, uses a walker and has had very significant weight loss over many years.  He has been short of breath and has significant medical disease including chronic obstructive pulmonary disease, sinus problems, bladder tumor and BPH.  He is currently convalescing on station 66.  He presents as somewhat lethargic, weak and generally despondent.  He is not reporting thoughts of self-harm and denies a history of being treated for depression.  He has significant sleep disruption.  He has no appetite.  He speaks in a very soft voice and physically looks quite debilitated.  He has a number of issues that they are currently trying to remedy, the most significant of which seems to be a bad COPD exacerbation in the context of other problems including his weight loss.  He told the staff that he has been feeling trapped in the hospital because he cannot walk and asked the staff \"to find somebody to kill me.\"  He says it was a joke and denies that he would try to hurt himself.      On further questioning, he denies a history of treatment for depression, hypomania, taqueria, generalized anxiety disorder, panic disorder, obsessive-compulsive disorder, psychosis or other psychiatric disturbance.  He is not currently on any psychotropic medications.  I did have a chance to review his labs and his general electrolytes have been normal.  He has a markedly elevated WBC, but his liver function tests are normal.  His total protein is quite low at 5.4 along with a low albumin of 1.4, suggesting significant malnutrition.      PAST PSYCHIATRIC HISTORY:  Unremarkable, though he has had failure to thrive picture for quite some time.      PAST CHEMICAL DEPENDENCY HISTORY:  None reported.      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      MEDICATIONS:  Spiriva, Flomax, Afrin, albuterol nebulizer, fluticasone, furoate, Tylenol p.r.n.,   Proventil p.r.n., Biotene, Dulcolax, Atropine ophthalmic solution, Flonase, Robitussin, lidocaine, melatonin, metoprolol, morphine p.r.n., Nicorette, Nitrostat, Zofran, Sudafed, Senokot-S, [place].      FAMILY HISTORY:  He denies mental illness, chemical dependency or suicide.      SOCIAL HISTORY:  I know that he typically lives alone.  He is a retired TV repairman with some college education.  He does not have children and is .  He is currently DNR/DNI.      REVIEW OF SYSTEMS:   A 10-point review of systems is notable for cachexia on constitutional exam and lethargy on his neurologic exam.  The remainder of the 10-point review of systems is negative.  Most recent vital signs show oxygen saturation 91%.  Additional vital signs not currently available.      MENTAL STATUS EXAMINATION:  Appearance:  The patient is a cachectic man with a nasogastric tube in place.  He is unshaven, appears thin and slightly lethargic.  Speech is soft and hypophonic, use of language fair.  Motor exam slowed and withdrawn.  Coordination, station, gait impaired due to weakness.  Muscle strength and tone are diminished.  Affect is flat.  Mood was subjectively depressed.  Thought process is slowed but generally logical, coherent and goal directed.  No loosening of associations, no flight of ideas.  Thought content negative for current hallucinations, delusions, paranoia, suicidal or homicidal ideation.  He endorses some depressive cognitions and difficulty with sleep and appetite.  He denies specific thoughts of wanting to hurt himself or others, but projects a sense of hopelessness about his medical condition.  Insight and judgment fair.  Cognitive exam, the patient is slightly lethargic, rouses quickly to voice and can attend a conversation.  Concentration is poor.   Recent memory fair.  Remote memory grossly intact.  General fund of knowledge average.      IMPRESSION:  The patient is an 82-year-old gentleman currently DNR/DNI presenting with failure to thrive and multiple medical issues.  He likely has symptoms of a major depressive disorder secondary to his general medical condition.  I think it is reasonable to offer him mirtazapine to target sleep issues and appetite.  This may also help his mood.  We will start with a low dose.  His prognosis is guarded, obviously he is going to need transitional care for rehabilitation once he is stabilized medically.  He does not appear to be an imminent risk to himself.      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   Axis II:  Failure to thrive with weight loss.   Axis III:   COPD  exacerbation.   Axis IV:  Bladder cancer.      PLAN:   1.  Initiate Remeron 7.5 mg each day at bedtime.   2.  Continue medical management as you are.  Be judicious with narcotics and other potentially deliriogenic medications as he is quite deconditioned and high risk for delirium.   3.  We will follow as needed.         IZZY AMADO MD             D: 2017 07:33   T: 2017 07:57   MT: SANDRA      Name:     KAYLA MADRIGAL   MRN:      [id number]        Account:       [id number]   :      1935           Consult Date:  2017      Document: [id number]    [PR1.1]   Revision History        User Key Date/Time User Provider Type Action    > PR1.1 2017  8:35 AM Izzy Amado MD Physician Sign            Consults by Izzy Amado MD at 2017  7:23 AM     Author:  Izzy Amado MD Service:  Psychiatry Author Type:  Physician    Filed:  2017  7:23 AM Date of Service:  2017  7:23 AM Creation Time:  2017  7:23 AM    Status:  Signed :  Izzy Amado MD (Physician)     Consult Orders:    1. Psychiatry IP Consult:  depression; Consultant may enter orders: Yes; Patient to be seen: Routine; Call back #: na [[id number]] ordered by King Garcia MD at 09/28/17 0647                Pt seen for new psychiatric consultation, see my dictation.    King Garcia MD[PR1.1]      Revision History        User Key Date/Time User Provider Type Action    > PR1.1 9/28/2017  7:23 AM King Garcia MD Physician Sign            Consults by Tesfaye Dean MD at 9/17/2017  4:03 PM     Author:  Tesfaye Dean MD Service:  Cardiology Author Type:  Physician    Filed:  9/26/2017 11:33 AM Date of Service:  9/17/2017  4:03 PM Creation Time:  9/17/2017  4:00 PM    Status:  Addendum :  Tesfaye Dean MD (Physician)     Consult Orders:    1. Cardiology IP Consult: Patient to be seen: Routine - within 24 hours; Rising troponins.  3rd set and echocardiogram ordered; Consultant may enter orders: Yes [[id number]] ordered by Mariano Singer DO at 09/17/17 0748                [facility]    Cardiology Consultation     Date of Admission:  9/15/2017    Assessment & Plan   Bong Noguera is a 82 year old male who was admitted on 9/15/2017.    A/P[TV1.1]  1. Elevated troponin in setting of left lower lobe pneumonia  Patient does not have any chest pain. His troponins are plateaued and not consistent with acute coronary syndrome. They're likely due to type II leak. At this time, I would recommend an echocardiogram to assess wall motion and ejection fraction. Peak troponin is 0.97. If ejection fraction is stable, we should consider an pharmacological stress imaging procedure like stress MRI. We'll start him on empiric statin and check lipid profile given that he has abdominal aortic aneurysm. Start baby aspirin.    2. Left lower lobe pneumonia  On antibiotics. Management per hospitalist.    3. Bladder cancer  By urology    4. Large abdominal aortic aneurysm found incidentally  Vascular surgery  "following.[TV1.2]          Tesfaye Dean MD    Primary Care Physician   [facility]    Reason for Consult   Reason for consult: I was asked by[TV1.1] hospitalist[TV1.2]  to evaluate this patient for[TV1.1]elevated troponin[TV1.2].    History of Present Illness      Bong Noguera is an 82-year-old gentleman who lives alone, and called 911[TV1.1]on day of admission[TV1.2]e to progressive generalized weakness which has been coming on gradually, in his words, for \"years.\"[TV1.1]He is a poor historian.[TV1.2]   Unfortunately, he is not very forthcoming in information.  Basically, he has been using a walker at home, able to get around, but it has been more and more difficult.  He has had significant weight loss in the last several years, but it has stabilized more recently.  He has chronic shortness of breath with exertion, and he states this has not changed.  He also has a chronic cough that is productive of yellow sputum which he states has not changed.  He has chronic pain in his chest as well with coughing and deep breathing.  Again, he states there has been no recent change in this.        However, in the ED, multiple abnormalities were found[TV1.1]but[TV1.2]  was afebrile.  His heart rate was slightly elevated at 108.  Initial blood pressure was 86/61, respirations 24, and oxygenation was 93% on 3 liters.  His labs are significant for an elevated creatinine of 1.97, which is up from 0.71 on 07/20.  His sodium and potassium were normal at 139 and 3.9, and BUN was up at 85.  His white blood cell count was 15.7.  Urinalysis was not that exciting, with 13 white blood cells and 7 RBC, and he denies any urinary symptoms at this time.  His lactic acid was 2.2.  Procalcitonin was 5.22.  VBG reveals pH of 7.43, CO2 of 34, O2 of 64.       His chest x-ray reveals an extensive opacity of the left lower lobe, but otherwise, lungs are clear without any evidence of pleural effusion or edema.     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     Cachectic (H)     Pneumonia       Past Surgical History   No relevant history    Prior to Admission Medications   Prior to Admission Medications   Prescriptions Last Dose Informant Patient Reported? Taking?   Nasal Dilators (BREATHE RIGHT ADVANCED) STRP   Yes No   Sig: daily   Oxymetazoline HCl (AFRIN 12 HOUR NA)   Yes Yes   Sig: Spray in nostril twice a week    PULMICORT FLEXHALER 180 MCG/ACT inhaler   No Yes   Sig: TAKE 2 PUFF BY MOUTH TWICE DAILY   Pseudoephedrine HCl (SUDAFED PO)   Yes Yes   Sig: Take by mouth every morning Dose unknown   SPIRIVA HANDIHALER 18 MCG inhalation capsule   No Yes   Sig: INHALE 1 CAPSULE BY MOUTH INTO THE LUNGS DAILY   fluticasone (FLONASE) 50 MCG/ACT spray prn  Yes Yes   Sig: Spray 1 spray into both nostrils daily as needed for rhinitis    tamsulosin (FLOMAX) 0.4 MG capsule Past Month at Unknown time  Yes Yes   Sig: Take 0.4 mg by mouth once a week      Facility-Administered Medications: None     Current Facility-Administered Medications   Medication Dose Route Frequency     enoxaparin  40 mg Subcutaneous Q24H     sodium chloride (PF)  10 mL Intracatheter Once     fluticasone furoate  1 puff Inhalation Daily     tiotropium  18 mcg Inhalation Daily     tamsulosin  0.4 mg Oral Weekly     cefTRIAXone  2 g Intravenous Q24H     azithromycin  250 mg Oral Q24H     Current Facility-Administered Medications   Medication Last Rate     - MEDICATION INSTRUCTIONS -       - MEDICATION INSTRUCTIONS -       Allergies   Allergies   Allergen Reactions     Ciprofloxacin      Clindamycin Hcl      Hydrocodone      Was no help in the past     Tylenol With Codeine [Acetaminophen-Codeine]      He things he passed out with this        Social History    reports that he has been smoking Cigars.  He has been smoking about 0.10 packs per day. He has never used smokeless tobacco. He reports that he drinks about 0.5 oz of alcohol per week  He reports that he does not use illicit drugs.    Family History[TV1.1]   Reviewed and no h/o cardiomyopathy or premature CAD in family[TV1.3]  Review of Systems   The comprehensive 10 point Review of Systems is negative other than noted in the HPI or here.     Physical Exam   Vital Signs with Ranges  Temp:  [97.3  F (36.3  C)-98  F (36.7  C)] 97.5  F (36.4  C)  Pulse:  [120-133] 121  Heart Rate:  [] 96  Resp:  [18-34] 20  BP: ()/() 101/49  FiO2 (%):  [100 %] 100 %  SpO2:  [85 %-96 %] 95 %  Wt Readings from Last 4 Encounters:   08/10/17 64 kg (141 lb)   07/20/17 63.5 kg (140 lb)   05/30/17 65.4 kg (144 lb 3.2 oz)   08/16/16 61.2 kg (135 lb)     I/O last 3 completed shifts:  In: 740 [P.O.:540; I.V.:200]  Out: 1000 [Urine:1000]      Vitals: /49 (BP Location: Left arm)  Pulse 121  Temp 97.5  F (36.4  C) (Oral)  Resp 20  SpO2 95%[TV1.1]    Constitutional:   fatigued   Eyes:   extra-ocular muscles intact   ENT:   sinuses nontender on palpation   Neck:   no jugular venous distension   Hematologic / Lymphatic:   no cervical lymphadenopathy   Back:   symmetric   Lungs:   crackles left base and left anterior   Cardiovascular:   normal S1 and S2 and no murmur noted   Abdomen:   normal bowel sounds and non-distended     Musculoskeletal:   no lower extremity pitting edema present   Neurologic:   Sleepy, moves all 4 limbs     Skin:   no rashes[TV1.2]         Recent Labs  Lab 09/17/17  1214 09/17/17  0617 09/17/17  0340   TROPI 0.567* 0.972* 0.470*         Recent Labs  Lab 09/17/17  1214 09/17/17  0617 09/17/17  0340 09/16/17  0750 09/15/17  2319   WBC  --   --  19.4*  --  15.7*   HGB  --   --  15.6  --  14.9   MCV  --   --  95  --  93   PLT  --   --  249  --  253   NA  --   --  139 139 136    POTASSIUM  --   --  3.4 3.9 3.9   CHLORIDE  --   --  107 104 100   CO2  --   --  24 27 23   BUN  --   --  50* 69* 85*   CR  --   --  0.94 1.36* 1.97*   GFRESTIMATED  --   --  77 50* 33*   GFRESTBLACK  --   --  >90 61 40*   ANIONGAP  --   --  8 8 13   LETHA  --   --  8.2* 8.0* 8.8   GLC  --   --  146* 98 154*   ALBUMIN  --   --  1.6*  --  2.0*   PROTTOTAL  --   --   --   --  6.2*   BILITOTAL  --   --   --   --  0.8   ALKPHOS  --   --   --   --  93   ALT  --   --   --   --  15   AST  --   --   --   --  20   TROPI 0.567* 0.972* 0.470*  --   --      No results for input(s): CHOL, HDL, LDL, TRIG, CHOLHDLRATIO in the last 82527 hours.    Recent Labs  Lab 09/17/17  0340 09/15/17  2149   WBC 19.4* 15.7*   HGB 15.6 14.9   HCT 45.7 42.1   MCV 95 93    253       Recent Labs  Lab 09/15/17  2205   PHV 7.43   PO2V 59*   PCO2V 34*   HCO3V 23       Recent Labs  Lab 09/17/17  0340   NTBNPI 962     No results for input(s): DD in the last 168 hours.  No results for input(s): SED, CRP in the last 168 hours.    Recent Labs  Lab 09/17/17  0340 09/15/17  2149    253     No results for input(s): TSH in the last 168 hours.    Recent Labs  Lab 09/15/17  2252   COLOR Yellow   APPEARANCE Slightly Cloudy   URINEGLC Negative   URINEBILI Negative   URINEKETONE Negative   SG 1.019   UBLD Moderate*   URINEPH 5.5   PROTEIN 30*   NITRITE Negative   LEUKEST Negative   RBCU 7*   WBCU 13*       Imaging:  Recent Results (from the past 48 hour(s))   Chest XR,  PA & LAT    Narrative    CHEST TWO VIEWS  9/15/2017 10:29 PM     COMPARISON: None.    HISTORY: Cough and hypoxic.      Impression    IMPRESSION: There is extensive airspace opacity throughout the left  lower lobe. The lungs are otherwise clear. There is no pleural  effusion or pneumothorax. Heart size is normal with no evidence for  congestive failure.    JAYDON GOMES MD   XR Chest Port 1 View    Narrative    XR CHEST PORT 1 VIEW  9/17/2017 3:36 AM      HISTORY: Chest pain.      COMPARISON: 9/15/2017.    FINDINGS: Upright portable chest. There is again extensive infiltrate  in the left lung, particularly at the lung base. Probable left pleural  effusion. The right lung is clear. No pneumothorax.      Impression    IMPRESSION: Large left lung infiltrate.    LIU MARTINI MD   CT Chest/Abdomen/Pelvis w Contrast    Narrative    CT CHEST, ABDOMEN AND PELVIS WITH CONTRAST 9/17/2017 9:50 AM    HISTORY: Hypoxic. Tachycardia. Pleuritic chest pain. History of  bladder cancer.      TECHNIQUE: 73 mL Isovue 370. Axial images with coronal  reconstructions. Radiation dose for this scan was reduced using  automated exposure control, adjustment of the mA and/or kV according  to patient size, or iterative reconstruction technique.    COMPARISON: None.    FINDINGS:      Chest: The pulmonary arteries are well-opacified. No CT evidence for  acute pulmonary embolus. No convincing significant adenopathy. Marked  paucity of subcutaneous fat.    Extensive advanced bullous and emphysematous changes in both lungs.  Prominent dependent atelectasis at the posterior right lung base.  There is an unusual appearance to the left lung. Extensive infiltrate  involving the majority of the left lower lobe and likely portions of  the posterior left upper lobe. Scattered throughout the infiltrate are  numerous tiny air collections. These likely represent the numerous  blebs or bullae within the left lung surrounded by the pneumonia. This  process has an unusual flattened anterior surface superiorly,  suggesting that there may be a significant liquid component to the  infiltrate. It is possible that this represents diffusely edematous  lung rather than pneumonia.    Abdomen/pelvis: Several liver cysts. There may be mild intrahepatic  biliary dilatation. Common bile duct is within normal limits for age.  Large right renal cyst. Small left renal cyst. Multiple gallstones.  The upper abdominal organs are otherwise within  normal limits.    Infrarenal abdominal aortic aneurysm measuring 7 x 6 cm in the lower  abdomen small amount of peripheral atherosclerotic plaque. Large soft  plaque most prominent distally where the opacified lumen is 1.8 cm.  The aneurysm extends to the proximal aspect of the common iliac  arteries. 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 The left common iliac artery originates from  the lower aspect of the abdominal aortic aneurysm.    The urinary bladder is moderately distended. Along the posterior right  lateral wall there is a focal mass arising from the wall measuring 2.9  x 1.8 cm, suspicious for recurrence of the known bladder cancer. It  has some peripheral calcification. Bowel and mesentery grossly normal.      Impression    IMPRESSION:    1. Large area of consolidation in the left lung, fairly low  attenuation and likely edematous lung, occupying a significant portion  of the left hemithorax. Further clarification from the patient's  physician states that the patient has been coughing up large amounts  of mucus. There could be mucoid component.  2. Extensive advanced emphysematous and bullous changes in both lungs.  3. Gallstones.  4. 7 cm infrarenal abdominal aortic aneurysm with a large amount of  soft plaque causing significant luminal narrowing. 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  5. Suspicious for recurrence of the urinary bladder malignancy.       Echo:  No results found for this or any previous visit (from the past 4320 hour(s)).[TV1.1]              Revision History        User Key Date/Time User Provider Type Action    > TV1.3 9/26/2017 11:33 AM Tesfaye Dean MD Physician Addend     TV1.2 9/17/2017  4:28 PM Tesfaye Dean MD Physician Sign     TV1.1 9/17/2017  4:00 PM Tesfaye Dean MD Physician             Consults by Ronnie Rubio RN at 9/25/2017 10:28 AM     Author:   Ronnie Rubio RN Service:  Hospice Author Type:  Registered Nurse    Filed:  9/25/2017 10:33 AM Date of Service:  9/25/2017 10:28 AM Creation Time:  9/25/2017 10:28 AM    Status:  Signed :  Ronnie Rubio RN (Registered Nurse)         Met with pt and his nephew Brooks to discuss the hospice benefit and philosophy.  Reviewed that hospice does not provide 24 hour care.  Medications, equipment and services were discussed.  Provided bridge sheet with hospice info to nephew Brooks per pt request.  Brooks states they will need placement at a nursing facility, as moving home with 24 hr help does not seem like a workable option.  Brooks also asked to speak to MD regarding plan of care.  Discussed meeting with JEFF Merchant and JUAREZ Olsen RN.  Shonda to follow up with family about placement options and nurse Motley to page MD for family.  No consent signed as discharge plan is not known at this time.  Please contact [facility] when plans are known so discharge can be coordinated.  Thank you for this consult.      [phone][AH1.1]     Revision History        User Key Date/Time User Provider Type Action    > AH1.1 9/25/2017 10:33 AM Ronnie Rubio RN Registered Nurse Sign            Consults by Vida Larson APRN CNP at 9/22/2017  3:34 PM     Author:  Vida Larson APRN CNP Service:  Palliative Author Type:  Nurse Practitioner    Filed:  9/22/2017  8:45 PM Date of Service:  9/22/2017  3:34 PM Creation Time:  9/22/2017  3:34 PM    Status:  Signed :  Vida Larson APRN CNP (Nurse Practitioner)         [facility]    Palliative Care Consultation     Bong Noguera  MRN# [id number]  Date of Admission:  9/15/2017  Date of Service (when I saw the patient): 09/22/17  Reason for consult: Consulted by Dr. Linda for Goals of care    Assessment & Plan   Mr. Noguera is a markedly pleasant 82 year old gentleman with COPD, suspected papillary bladder cancer and BPH who was  "admitted 9/16/2017 for acute hypoxemic respiratory failure and severe sepsis due to left lower lobe aspiration pneumonia as well as NSTEMI and acute systolic CHF exacerbation.    Symptoms/Recommendations   1. Goals of Care. Met with Bong this morning. Discussed his wish to continue with oral intake despite the risk of developing a recurrent aspiration pneumonia. We discussed transitioning our focus to how we can keep him comfortable for whatever time he has left. We discussed hospice cares and how that services can help support him after discharge from the hospital. We discussed discharging to home with 24 hour hired nursing care vs to a LTC facility. Recommended he meet with hospice to further discuss how their services can be helpful for him.[KW1.1]     ADDENDUM: Pt's nephew Brooks arrived at hospital and I met with him and Mr Noguera together. I reviewed our discussion which included exploring plans to discharge to home with hospice. Mr Noguera stated, \"so if I eat or drink anything I might die?\" I explained the risk of recurrent aspiration pneumonia and stated that he had expressed a wish to eat and drink despite this risk. Mr Noguera stated, \"I did no such thing.\" I then reviewed with him his two options. One being to have a PEG tube placed for ongoing nutrition with a plan to d/c to TCU in an effort to regain some strength but understanding that the underlying suspected malignancy is likely what is causing his global weakness and he may not get much better. Also discussed the continued risk for aspiration pneumonia with the feeding tube in place. The second option would be to shift our focus to his comfort, allow him to eat and drink for comfort/pleasure and work with hospice to develop a safe discharge plan, potentially in his home with 24 hour caregivers. Brooks verbalized understanding and tells me they will continue to discuss these options together. Unit SW updated and will arrange an informational hospice " "meeting. I also provided a short form health care directive so Mr Noguera could name his nephew Brooks as his HCA.[KW1.2]     Support/Coping  -nephew Brooks at bedside   -Will involve Palliative LICSW, Tish Chaidez, and/or Palliative , Herlinda Kendall    Decisional Support, Goals of Care, Counseling & Coordination  Decisional Capacity Intact?  -Yes[KW1.1] - though appears possibly to have some short term memory troubles[KW1.2]  Health Care Directive on File?  -No[KW1.1] - provided short form Health care directive[KW1.2]   Code Status/Resuscitation Preferences?  -DNR/DNI  Plan of Care?[KW1.1]  -pending ongoing conversations[KW1.2]    Discussion  Introduced the scope of our practice to Mr Noguera. Discussed our potential roles for symptom management, support/coping, and decisional support (aka goals of care).[KW1.1]     Met with Mr Noguera this afternoon. He is seen sitting up in bed eating jello. He appears slightly annoyed by my presence and is sarcastic with some of his remarks. I reviewed the discussion he had had earlier today with Dr Linda regarding his risk for aspiration and recurrent pneumonias. I reviewed that he had expressed his wish to be allowed to eat and drink despite these risks, Mr Noguera agreed and requested ice water. The nursing assistant brought him a glass and as he took a drink of it he said, \"that is worth dying for.\" We continued our conversation regarding what our plans should be moving forward. I explained to him what a comfort focused pathway would involved and recommended he meet with the hospice team to obtain more information regarding their services. Mr Noguera was agreeable to meeting with them. I then discussed the 3 places hospice cares can be provided (home, LTC facility, free standing [facility]). Mr Noguera states he would prefer to be home but didn't think he would be able to care for himself in his weakened state. I explained that sometimes people hire in nursing help if " "they have the means. Mr Noguera thought this would be a good plan. I offered to call Brooks to update him on our conversation and he said that would be okay. He took another drink of water and said, \"so what you are saying is each drink makes me closer to death.\" I told him the more he eats and drinks, the higher the risk that he will develop a pneumonia and that may be life threatening. I asked if he had any worries about dying and he said none-what-so-ever. I asked if he felt he would be comfortable with the plan to not re-hospitalize him when he develops his next infection and he stated, \"I don't know, I can't predict the future.\" I explained to him that when people discharge with hospice cares, they do not return to the hospital for aggressive treatment. Instead we allow the natural dying process to happen. Mr Noguera nodded. I again asked if it would be okay if I contacted Brooks and Mr Noguera said that would be fine.[KW1.2]     Thank you for involving us in the care of this patient and family. We will continue to follow. Please do not hesitate to contact me with questions or concerns or the on-call provider for our team if evening or weekend.    Vida AVENDANO, [facility]  Palliative Medicine   Pager [phone]    Attestation:  Total time on the floor involved in the patient's care: 70 minutes  Total time spent in counseling/care coordination: >50%    Chief Complaint   Goals of Care    History is obtained from the patient, staff, and extensive chart review.     Adopted from H&P  Bong Noguera is an 82-year-old gentleman who lives alone, and called 911 today due to progressive generalized weakness which has been coming on gradually, in his words, for \"years.\"  Unfortunately, he is not very forthcoming in information, is resistant to answering all of the questions, repeatedly stating, \"It's on my list.\"  He has a list of clinic and doctor visits that I reviewed as well as discussed with the patient.  Basically, he " has been using a walker at home, able to get around, but it has been more and more difficult.  He has had significant weight loss in the last several years, but it has stabilized more recently.  Weight was 155 in 2011, 149 in 2014, and 136 in 2016, and has remained in the 130s.  He has chronic shortness of breath with exertion, and he states this has not changed.  He also has a chronic cough that is productive of yellow sputum which he states has not changed.  He has chronic pain in his chest as well with coughing and deep breathing.  Again, he states there has been no recent change in this.  He denies any acute changes.       However, in the ED, multiple abnormalities were found which appear acute in nature.  He was afebrile.  His heart rate was slightly elevated at 108.  Initial blood pressure was 86/61, respirations 24, and oxygenation was 93% on 3 liters.  His labs are significant for an elevated creatinine of 1.97, which is up from 0.71 on 07/20.  His sodium and potassium were normal at 139 and 3.9, and BUN was up at 85.  His white blood cell count was 15.7.  Urinalysis was not that exciting, with 13 white blood cells and 7 RBC, and he denies any urinary symptoms at this time.  His lactic acid was 2.2.  Procalcitonin was 5.22.  VBG reveals pH of 7.43, CO2 of 34, O2 of 64.       His chest x-ray reveals an extensive opacity of the left lower lobe, but otherwise, lungs are clear without any evidence of pleural effusion or edema.       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  It appears he was supposed to follow up for that, but has not yet done so.  He has an appointment with Dr. Trujillo in the next couple of months.       The rest of his 10-point review of systems was unremarkable, and again he denies any acute symptoms bringing him in, just generalized weakness.  It should also be noted that when the EMTs went to go to his house, he  had multiple sticky notes around the house with things that stated future appointments, and also a reminder to breathe deeply, but when the patient is asked questions of orientation, he is able to answer everything but the day of the week, stating that he no longer keeps track of this.  Otherwise he appears oriented.     Past Medical History    I have reviewed this patient's medical history and updated it with pertinent information if needed.   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 1/30/2015       Past Surgical History   I have reviewed this patient's surgical history and updated it with pertinent information if needed.    Social History   Living situation: lives independently in his home in [facility]    Family system: not , no children. 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     Allergies   Allergies   Allergen Reactions     Ciprofloxacin      Clindamycin Hcl      Hydrocodone      Was no help in the past     Tylenol With Codeine [Acetaminophen-Codeine]      He things he passed out with this       Medications   Current Facility-Administered Medications Ordered in Epic   Medication Dose Route Frequency Last Rate Last Dose     nicotine Patch in Place   Transdermal Q8H         [START ON 9/23/2017] nicotine patch REMOVAL   Transdermal Daily          nicotine (NICODERM CQ) 14 MG/24HR 24 hr patch 1 patch  1 patch Transdermal Daily   1 patch at 09/22/17 1306     metoprolol (LOPRESSOR) injection 2.5 mg  2.5 mg Intravenous Q4H PRN         artificial saliva (BIOTENE MT) spray 1 spray  1 spray Mouth/Throat Q6H PRN   1 spray at 09/21/17 2054     0.9% sodium chloride infusion   Intravenous Continuous 50 mL/hr at 09/21/17 2218       piperacillin-tazobactam (ZOSYN) 4.5 g vial to attach to  mL bag  4.5 g Intravenous Q6H 200 mL/hr at 09/22/17 0628 4.5 g at 09/22/17 1257     lidocaine 1 % 1 mL  1 mL Other Q1H PRN         lidocaine (LMX4) cream   Topical Q1H PRN         sodium chloride (PF) 0.9% PF flush 3 mL  3 mL Intracatheter Q1H PRN         sodium chloride (PF) 0.9% PF flush 3 mL  3 mL Intracatheter Q8H   3 mL at 09/21/17 1804     nitroGLYcerin (NITROSTAT) sublingual tablet 0.4 mg  0.4 mg Sublingual Q5 Min PRN         influenza Vac Split High-Dose (FLUZONE) injection 0.5 mL  0.5 mL Intramuscular Prior to discharge         albuterol neb solution 2.5 mg  3 mL Nebulization Q4H While awake   2.5 mg at 09/22/17 1215     albuterol (PROVENTIL) neb solution 2.5 mg  2.5 mg Nebulization Q2H PRN         methylPREDNISolone sodium succinate (solu-MEDROL) injection 125 mg  125 mg Intravenous Q12H   125 mg at 09/22/17 0403     melatonin tablet 3 mg  3 mg Oral At Bedtime PRN         carvedilol (COREG) tablet 3.125 mg  3.125 mg Oral BID w/meals   3.125 mg at 09/20/17 0806     No lozenges or gum should be given while patient on BIPAP/AVAPS/AVAPS AE   Does not apply Continuous PRN         hypromellose-dextran (ARTIFICAL TEARS) ophthalmic solution 1 drop  1 drop Both Eyes Q1H PRN         Patient may continue current oral medications   Does not apply Continuous PRN         enoxaparin (LOVENOX) injection 40 mg  40 mg Subcutaneous Q24H   40 mg at 09/21/17 0912     sodium chloride (PF) 0.9% PF flush 10 mL  10 mL Intracatheter Once         sodium chloride (OCEAN) 0.65 % nasal spray 1-2  spray  1-2 spray Nasal Q1H PRN   2 spray at 09/22/17 0027     aspirin EC EC tablet 81 mg  81 mg Oral Daily   81 mg at 09/21/17 0912     atorvastatin (LIPITOR) tablet 20 mg  20 mg Oral Daily   20 mg at 09/21/17 0912     fluticasone (FLONASE) 50 MCG/ACT spray 1 spray  1 spray Both Nostrils Daily PRN   1 spray at 09/18/17 1825     pseudoePHEDrine (SUDAFED) tablet 30 mg  30 mg Oral Daily PRN   30 mg at 09/18/17 1824     fluticasone furoate (ARNUITY ELLIPTA) 100 MCG/ACT inhalation powder 1 puff  1 puff Inhalation Daily   1 puff at 09/21/17 0924     tiotropium (SPIRIVA) capsule 18 mcg  18 mcg Inhalation Daily   18 mcg at 09/21/17 1216     tamsulosin (FLOMAX) capsule 0.4 mg  0.4 mg Oral Weekly   0.4 mg at 09/16/17 0239     potassium chloride SA (K-DUR/KLOR-CON M) CR tablet 20-40 mEq  20-40 mEq Oral Q2H PRN         potassium chloride (KLOR-CON) Packet 20-40 mEq  20-40 mEq Oral or Feeding Tube Q2H PRN         potassium chloride 10 mEq in 100 mL intermittent infusion  10 mEq Intravenous Q1H PRN         potassium chloride 10 mEq in 100 mL intermittent infusion with 10 mg lidocaine  10 mEq Intravenous Q1H PRN   10 mEq at 09/20/17 2141     potassium chloride 20 mEq in 50 mL intermittent infusion  20 mEq Intravenous Q1H PRN         acetaminophen (TYLENOL) tablet 650 mg  650 mg Oral Q4H PRN   650 mg at 09/19/17 0923     naloxone (NARCAN) injection 0.1-0.4 mg  0.1-0.4 mg Intravenous Q2 Min PRN         senna-docusate (SENOKOT-S;PERICOLACE) 8.6-50 MG per tablet 1-2 tablet  1-2 tablet Oral BID PRN         polyethylene glycol (MIRALAX/GLYCOLAX) Packet 17 g  17 g Oral Daily PRN         bisacodyl (DULCOLAX) Suppository 10 mg  10 mg Rectal Daily PRN         ondansetron (ZOFRAN-ODT) ODT tab 4 mg  4 mg Oral Q6H PRN        Or     ondansetron (ZOFRAN) injection 4 mg  4 mg Intravenous Q6H PRN         guaiFENesin (ROBITUSSIN) 20 mg/mL solution 10 mL  10 mL Oral Q4H PRN   10 mL at 09/19/17 0945     nicotine polacrilex (NICORETTE) gum 2 mg  2 mg  Buccal Q1H PRN   2 mg at 09/22/17 1306     Current Outpatient Prescriptions Ordered in Epic   Medication     tamsulosin (FLOMAX) 0.4 MG capsule       Review of Systems   The comprehensive review of systems is negative other than noted in the assessment/plan.    Physical Exam   Temp: 95.4  F (35.2  C) Temp src: Oral BP: 104/59   Heart Rate: 80 Resp: 18 SpO2: 97 % O2 Device: Nasal cannula Oxygen Delivery: 3 LPM  Vitals:    09/22/17 0658   Weight: 65 kg (143 lb 4.8 oz)     CONSTITUTIONAL: NAD, A&Ox3[KW1.1], though maybe a little forgetful[KW1.2]. Calm and cooperative.  HEENT: NCAT, MMM  NECK: Supple  CARDIOVASCULAR: exam deferred   RESPIRATORY: NL respiratory effort on oxygen via NC, productive cough of yellow sputum noted during conversation   GASTROINTESTINAL: exam deferred  MUSCULOSKELETAL: No extremity edema. Moving freely in bed   SKIN: Warm and intact. No concerning lesions or rashes on exposed skin surfaces   NEUROLOGIC: Appropriately responsive during interview  PSYCH: Affect congruent     Data   Results for orders placed or performed during the hospital encounter of 09/15/17 (from the past 24 hour(s))   Basic metabolic panel   Result Value Ref Range    Sodium 141 133 - 144 mmol/L    Potassium 3.8 3.4 - 5.3 mmol/L    Chloride 100 94 - 109 mmol/L    Carbon Dioxide 34 (H) 20 - 32 mmol/L    Anion Gap 7 3 - 14 mmol/L    Glucose 144 (H) 70 - 99 mg/dL    Urea Nitrogen 52 (H) 7 - 30 mg/dL    Creatinine 0.76 0.66 - 1.25 mg/dL    GFR Estimate >90 >60 mL/min/1.7m2    GFR Estimate If Black >90 >60 mL/min/1.7m2    Calcium 8.0 (L) 8.5 - 10.1 mg/dL   CBC with platelets differential   Result Value Ref Range    WBC 24.3 (H) 4.0 - 11.0 10e9/L    RBC Count 4.41 4.4 - 5.9 10e12/L    Hemoglobin 14.2 13.3 - 17.7 g/dL    Hematocrit 42.8 40.0 - 53.0 %    MCV 97 78 - 100 fl    MCH 32.2 26.5 - 33.0 pg    MCHC 33.2 31.5 - 36.5 g/dL    RDW 13.7 10.0 - 15.0 %    Platelet Count 405 150 - 450 10e9/L    Diff Method Automated Method     %  Neutrophils 91.3 %    % Lymphocytes 3.6 %    % Monocytes 2.7 %    % Eosinophils 0.0 %    % Basophils 0.1 %    % Immature Granulocytes 2.3 %    Nucleated RBCs 0 0 /100    Absolute Neutrophil 22.2 (H) 1.6 - 8.3 10e9/L    Absolute Lymphocytes 0.9 0.8 - 5.3 10e9/L    Absolute Monocytes 0.7 0.0 - 1.3 10e9/L    Absolute Eosinophils 0.0 0.0 - 0.7 10e9/L    Absolute Basophils 0.0 0.0 - 0.2 10e9/L    Abs Immature Granulocytes 0.6 (H) 0 - 0.4 10e9/L    Absolute Nucleated RBC 0.0    XR Video Swallow w/o Esophagram    Narrative    VIDEO SWALLOWING EVALUATION   9/22/2017 10:31 AM     HISTORY: Overt signs and symptoms of aspiration on bedside.    COMPARISON: None.    FLUOROSCOPY TIME: 0.8 minutes.  SPOT IMAGES OR CINE RUNS: 5.    FINDINGS:    Thin: Pooling in the vallecula with a small sip spoon. When given a  drink by cup, there was silent aspiration.    Nectar: Silent aspiration.    Honey: Not administered.    Pudding: Silent aspiration. There is moderate residual debris in the  vallecula.    Semisolid: Not administered.    Solid: Not administered.    This study only includes the cervical esophagus.    ANDRE MAURICIO MD[KW1.1]                    Revision History        User Key Date/Time User Provider Type Action    > KW1.2 9/22/2017  8:45 PM Vida Larson APRN CNP Nurse Practitioner Sign     KW1.1 9/22/2017  3:34 PM Vida Larson APRN CNP Nurse Practitioner             Consults by Agapito Nath MD at 9/17/2017 12:07 PM     Author:  Agapito Nath MD Service:  Vascular Surgery Author Type:  Physician    Filed:  9/20/2017 12:41 PM Date of Service:  9/17/2017 12:07 PM Creation Time:  9/17/2017 12:05 PM    Status:  Signed :  Agapito Nath MD (Physician)     Consult Orders:    1. Vascular Surgery IP Consult: Patient to be seen: Routine - within 24 hours; AAA seen on CT chest/abd/pelvis.  Max diameter is 7 cm per radiology report.  Appears to be enlarged in comparison to CT scan from 1  year ago (5.7 cm at that time per repo... [[id number]] ordered by Mariano Singer DO at 09/17/17 1139                Reason for consult: 7cm Infrarenal Abdominal aortic aneurysm    HPI: 81 Yo Male w/ history of COPD and bladder[SL1.1] cancer[SL1.2] admitted on 9/15 for LLE pneumonia[SL1.1]/weakness[SL1.2], currently with Acute respiratory failure, shock - septic and possible cardiogenic component, NSTEMI and cachexia, ROBINSON.  Vascular surgery consulted for finding of 7cm infrarenal abdominal aortic aneurysm.[SL1.1]  Patient had no knowledge of this prior.  Denies any abdominal pain, indicates occasional sharp pain on left chest with deep breath.  Had RRT overnight for hypoxia, was on 15L supplemental O2. He received a breathing treatment and lasix and now is down to 5L simple mask talking comfortably.  Patient is on zithromax and rocephin for pneumonia.[SL1.2]    PMH:  COPD  Papillary bladder cancer, BPH on flomax  Previous tobacco use    PSH:  Bilateral knee arthroplasty    Allergies: Cipro, clindamycin, hydrocodone, tylenol with codeine    Social History: Quit tobacco in 2007,[SL1.1]  Significant use prior[SL1.2], uses nicotine gum; one beer per week, lives alone    Family History:[SL1.1] Denies family history of aneurysms, arterial or venous disorders, bleeding or clotting problems[SL1.2]    Vitals:[SL1.1]  97.3, SBP 80-90s, HR 90-100s, 5L mask 91%, RR 24[SL1.2]    Physical Exam:[SL1.1]  Alert, no acute distress, thin  Decreased breath sounds on Left, mild wheeze  RRR  Abd soft, palpable abdominal mass, nontender, no guarding or rigidity  Palpable radial, femoral, DP pulses bilaterally  No significant LE edema, prominent LE veins    WBC 19 from 15  Cr 0.9  Hg 15  Troponin 0.97 from 0.47 this am  Albumin 1.6    Sputum culture: mixed G+  Echo completed today    CTA chest/abd/pelvis: 7cm infrarenal abdominal aortic aneurysm, mass in pelvis noted, LLL pneumonia[SL1.2]    Assessment:  83 yo male with bladder  cancer, COPD, severe pneumonia, NSTEMI with 7cm infrarenal AAA    Plan:  Patient currently is high risk for repair due to active medical problems[SL1.1], infection needs to resolve prior to consideration for repair.[SL1.2]    Endovascular repair is a possibility although left external iliac artery is small[SL1.1] for access[SL1.2] and thus patient would require endologix graft.  Left common femoral disease is present also and would necessitate cut down.    Will follow closely for patient's medical course.  If he[SL1.1] continues to improve will[SL1.2] consider repair in 2-3 weeks.[SL1.1]    Encouraged increased nutrition. DVT prophylaxis.  Antibiotics for pneumonia.  Patient understands and all questions were answered.  Further recommendations to follow.    Patient seen[SL1.2] with Dr. Nath who is in agreement of the above plan.    Laureen Morales MD  Vascular Surgery Fellow  Pager [phone][SL1.1]    I have seen and examined the patient and I agree with Dr. Morales's documentation. 82 year old male with recently found asymptomatic 7 cm AAA. The abdomen and AAA are non tender. He has an ongoing pneumonia and also has had a NSTEMI. In Light of this we will have to delay the repair of this anueyrsm. Will wait 2-3 weeks for resolution of infection before doing placing an endograft in this gentleman. He will not survive and open operation due to the pulmonary and cardiovascular morbidity involved. We will follow him closely.  Agapiot Nath M.D.[KK1.1]     Revision History        User Key Date/Time User Provider Type Action    > KK1.1 9/20/2017 12:41 PM Agapito Nath MD Physician Sign     SL1.2 9/17/2017  1:01 PM Lejeune, Stacey, MD Fellow Sign     SL1.1 9/17/2017 12:05 PM Lejeune, Stacey, MD Fellow             Consults by Jaron Kilpatrick MD at 9/18/2017  1:27 PM     Author:  Jaron Kilpatrick MD Service:  Vascular Medicine Author Type:  Physician    Filed:  9/18/2017  1:30 PM Date  of Service:  9/18/2017  1:27 PM Creation Time:  9/18/2017 12:42 PM    Status:  Signed :  Jaron Kilpatrick MD (Physician)     Consult Orders:    1. [place] [facility] IP Consult: Patient to be seen: Routine - within 24 hours; AAA; Consultant may enter orders: Yes [[id number]] ordered by Agapito Nath MD at 09/18/17 0942                [facility]    Vascular Medicine Consultation[ST1.1]     Attestation: I have examined the patient independently of Bela Hill PA-C and agree with the examination and plan as delineated below.    Jaron Kilpatrick MD[CF1.1]     Date of Admission:  9/15/2017  Date of Consult (When I saw the patient): 09/18/17    Assessment & Plan   1. Asymptomatic 7.0 cm infrarenal AAA     This was incidentally noted on CT this admission. It will ultimately need repair to prevent rupture. However, he needs to fully recover from his pneumonia prior to this taking place. No further recommendations from a Vascular Medicine standpoint. Should he survive this hospitalization and eventually go on to have AAA repair, then we can follow again at that time. Otherwise, Vascular Medicine will sign off. Please call if we can be of any further assistance this admission ([phone]).     2. Hyperlipidemia    His lipid panel today was significant for an LDL of 26. He was already started on Lipitor by Cardiology. He should continue the same.     3. Nicotine abuse    He has a prior history of smoking cigars, but states he has not smoked any in over 10 years. Ongoing cessation from nicotine products was encouraged as this would only increase the size of his abdominal aneurysm.     4. Left lower lobe pneumonia    This is currently being treated with antibiotics. His white blood cell count remains high and he still is requiring 10 liters of oxygen via nasal cannula. Continue current cares per Hospitalist service.    5. Acute renal failure, resolved    His renal  function was poor upon admission, likely secondary to dehydration. This has since improved and his renal function has normalized.     6. Cardiomyopathy    Moderate to severe left ventricular systolic dysfunction with an EF of 30-35% on echocardiogram this admission. Cardiology is following.     7. Type 2 myocardial infarction    This likely occurred in the setting of pneumonia and cardiomyopathy. Cardiology following. Continue medical management and get a stress nuclear study later.     8. Bladder Cancer    Urology evaluated the patient. He had failed to follow up as an outpatient. They are recommending a TURBT prior to discharge if he is medically cleared.       Reason for Consult   Reason for consult: Asked by Dr. Nath to evaluate vascular risk factors in this 82 year old male former smoker who was incidentally noted on CT to have a 7 cm infrarenal AAA.     Primary Care Physician   Chaparro Carroll      History of Present Illness   Bong Noguera is a 82 year old male former smoker with bladder cancer who presented to the ER with progressive generalized weakness over the past few years. He has had chornic pain in left chest with deep breathing, productive cough of yellow sputum, and shortness of breath. On presentation, he was noted to be tachycardic and hypotensive with leukocytosis and acute renal failure. Chest x-ray was significant for a left lower lobe opacity. He was started on antibiotics and fluids and admitted for dehydration, sepsis, and pneumonia. During additional work-up, a CT of the chest/abdomen/pelvis was undertaken and incidentally noted a 7 cm infrarenal AAA. He was seen by Vascular Surgery and plan would be for endovascular repair, but only after he recovers from his pneumonia. We were asked by Vascular Surgery to evaluate him and optimize any vascular risk factors.     Past Medical History   Past Medical History:   Diagnosis Date     unusual bilateral infraorbital nerve pathway through  paranasal sinuses. 1/30/2015   Bladder cancer  COPD  Cachexia    Past Surgical History   Bilateral total knees    Prior to Admission Medications   Prior to Admission Medications   Prescriptions Last Dose Informant Patient Reported? Taking?   Nasal Dilators (BREATHE RIGHT ADVANCED) STRP   Yes No   Sig: daily   Oxymetazoline HCl (AFRIN 12 HOUR NA)   Yes Yes   Sig: Spray in nostril twice a week    PULMICORT FLEXHALER 180 MCG/ACT inhaler   No Yes   Sig: TAKE 2 PUFF BY MOUTH TWICE DAILY   Pseudoephedrine HCl (SUDAFED PO)   Yes Yes   Sig: Take by mouth every morning Dose unknown   SPIRIVA HANDIHALER 18 MCG inhalation capsule   No Yes   Sig: INHALE 1 CAPSULE BY MOUTH INTO THE LUNGS DAILY   fluticasone (FLONASE) 50 MCG/ACT spray prn  Yes Yes   Sig: Spray 1 spray into both nostrils daily as needed for rhinitis    tamsulosin (FLOMAX) 0.4 MG capsule Past Month at Unknown time  Yes Yes   Sig: Take 0.4 mg by mouth once a week   tamsulosin (FLOMAX) 0.4 MG capsule   No No   Sig: TAKE 1 CAPSULE BY MOUTH DAILY      Facility-Administered Medications: None     Allergies   Allergies   Allergen Reactions     Ciprofloxacin      Clindamycin Hcl      Hydrocodone      Was no help in the past     Tylenol With Codeine [Acetaminophen-Codeine]      He things he passed out with this       Social History   Bong ASTER Noguera reports that he has not been smoking Cigars for over 10 years. He reports that he drinks about 0.5 oz of alcohol per week  He reports that he does not use illicit drugs.    Family History   No family history on file.    Review of Systems   The 10 point Review of Systems is negative other than noted in the HPI or here.     Physical Exam   Temp: 97.8  F (36.6  C) Temp src: Oral BP: 108/64   Heart Rate: 93 Resp: 18 SpO2: 93 % O2 Device: High Flow Nasal Cannula (HFNC) Oxygen Delivery: 10 LPM  Vital Signs with Ranges  Temp:  [97.5  F (36.4  C)-99.7  F (37.6  C)] 97.8  F (36.6  C)  Heart Rate:  [] 93  Resp:  [18-20] 18  BP:  ()/(49-68) 108/64  FiO2 (%):  [100 %] 100 %  SpO2:  [88 %-97 %] 93 %  0 lbs 0 oz    Constitutional: awake, alert, cooperative, no apparent distress, and appears stated age  Eyes: Lids and lashes normal, pupils equal, round and reactive to light, extra ocular muscles intact, sclera clear, conjunctiva normal  ENT: normocepalic, without obvious abnormality, oropharynx pink and moist  Hematologic / Lymphatic: no lymphadenopathy  Respiratory: No increased work of breathing, good air exchange, clear to auscultation bilaterally, no crackles or wheezing  Cardiovascular: regular rate and rhythm, normal S1 and S2 and no murmur noted  GI: Normal bowel sounds, soft, non-distended, non-tender. Palpable AAA pulse  Skin: no redness, warmth, or swelling, no rashes and no lesions  Musculoskeletal: There is no redness, warmth, or swelling of the joints.  Full range of motion noted.  Motor strength is 5 out of 5 all extremities bilaterally.  Cachectic.   Neurologic: Awake, alert, oriented to name, place and time.  Cranial nerves II-XII are grossly intact.  Motor is 5 out of 5 bilaterally.    Neuropsychiatric:  Normal affect, memory, insight.  Pulses: Prominent pulse in AAA. Unable to palpate pedal pulses.     Data   Most Recent 3 CBC's:  Recent Labs   Lab Test  09/17/17   0340  09/15/17   2149  07/20/17   1438   WBC  19.4*  15.7*  9.9   HGB  15.6  14.9  13.5   MCV  95  93  93.8   PLT  249  253  165     Most Recent 3 BMP's:  Recent Labs   Lab Test  09/17/17   0340  09/16/17   0750  09/15/17   2149   NA  139  139  136   POTASSIUM  3.4  3.9  3.9   CHLORIDE  107  104  100   CO2  24  27  23   BUN  50*  69*  85*   CR  0.94  1.36*  1.97*   ANIONGAP  8  8  13   LETHA  8.2*  8.0*  8.8   GLC  146*  98  154*     Most Recent Cholesterol Panel:  Recent Labs   Lab Test  09/18/17   0845   CHOL  58   LDL  26   HDL  13*   TRIG  97     Most Recent Hemoglobin A1c:  Recent Labs   Lab Test  02/09/16   1215   A1C  5.8*[ST1.1]                Revision  History        User Key Date/Time User Provider Type Action    > CF1.1 2017  1:30 PM Jaron Kilpatrick MD Physician Sign     ST1.1 2017  1:27 PM Bela Hill PA-C Physician Assistant - C Sign            Consults by Leobardo Jean Baptiste MD at 2017  7:24 PM     Author:  Leobardo Jean Baptiste MD Service:  Urology Author Type:  Physician    Filed:  2017  7:24 PM Date of Service:  2017  7:24 PM Creation Time:  2017  7:06 PM    Status:  Signed :  Leobardo Jean Baptiste MD (Physician)     Consult Orders:    1. Urology IP Consult: please advise re: likelihood of metastatic disease from known bladder tumor. Mass noted on abdominal CT. Thank you.; Consultant may enter orders: Yes; Patient to be seen: Routine - within 24 hours; Requested Clinic/Group: [facility]... [[id number]] ordered by Monalisa Koehler MD at 17 0123                UROLOGY CONSULTATION:    PATIENT: Bong HENRY (1935)  AGE: 82 year old year old male    Primary Care Provider / Referring Physician:  Chaparro Carroll    CHIEF COMPLAINT:  Pneumonia due to infectious organism, unspecified laterality, unspecified part of lung  Acute respiratory failure with hypoxia (H)  Severe sepsis (H)  History of COPD  Acute kidney injury (H)  Abnormal urinalysis    HPI:   81 YO WM ADMITTED WITH FAILURE TO THRIVE AND LIKELY LEFT LOWER LOBE PNEUMONIA. IN ADDITION, HE HAS HAD 20-30 LB WEIGHT LOSS PAST 5-6 YRS. 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 HE WAS SCHEDULED FOR TURBT THEN BUT NEVER PURSUED FOR REASONS NOT CLEAR. HE HAS THUS NEVER BEEN TREATED. HE DOES HAVE ELEVATED WBC AT 15K AND HIS CREATININE IS UP AT 1.97.  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  PAST SOCIAL HISTORY  Social  History     Social History Narrative     Social History   Substance Use Topics     Smoking status: Current Some Day Smoker     Packs/day: 0.10     Types: Cigars     Last attempt to quit: 1/1/2007     Smokeless tobacco: Never Used     Alcohol use 0.5 oz/week     1 Cans of beer per week     Review of Systems  @[email or web address]@             No current outpatient prescriptions on file.                Allergies   Allergen Reactions     Ciprofloxacin      Clindamycin Hcl      Hydrocodone      Was no help in the past     Tylenol With Codeine [Acetaminophen-Codeine]      He things he passed out with this       PHYSICAL EXAM:          BP 90/56 (BP Location: Left arm)  Pulse 103  Temp 97.9  F (36.6  C) (Oral)  Resp 18  SpO2 92%         General appearance shows A CACHETIC 81 YO WM NAD     Constitutional: awake, alert, cooperative, no apparent distress, and appears stated age  Eyes: Lids and lashes normal, pupils equal, round and reactive to light, extra ocular muscles intact, sclera clear, conjunctiva normal  ENT: Normocephalic, without obvious abnormality, atraumatic, sinuses nontender on palpation, external ears without lesions, oral pharynx with moist mucous membranes, tonsils without erythema or exudates, gums normal and good dentition.  Hematologic / Lymphatic: no cervical lymphadenopathy  Respiratory: Mild respiratory distress  GI: No scars, normal bowel sounds, soft, non-distended, non-tender, no masses palpated, no hepatosplenomegally, non-distended and hernia present RIGHT INGUINAL HERNIA MODERATELY SIZED, NON TENDER AND DECOMPRESSES MANUALLY EASILY  Genitounirinary: phallus meatus non-circumcised and right and left testis normal  Skin: normal skin color, texture, turgor  Musculoskeletal: no lower extremity pitting edema present  there is no redness, warmth, or swelling of the joints  Neuropsychiatric: General: restless and normal eye contact[MF1.1]    Data[MF1.2]   Results for orders placed or performed during the hospital  encounter of 09/15/17 (from the past 24 hour(s))   Comprehensive metabolic panel   Result Value Ref Range    Sodium 136 133 - 144 mmol/L    Potassium 3.9 3.4 - 5.3 mmol/L    Chloride 100 94 - 109 mmol/L    Carbon Dioxide 23 20 - 32 mmol/L    Anion Gap 13 3 - 14 mmol/L    Glucose 154 (H) 70 - 99 mg/dL    Urea Nitrogen 85 (H) 7 - 30 mg/dL    Creatinine 1.97 (H) 0.66 - 1.25 mg/dL    GFR Estimate 33 (L) >60 mL/min/1.7m2    GFR Estimate If Black 40 (L) >60 mL/min/1.7m2    Calcium 8.8 8.5 - 10.1 mg/dL    Bilirubin Total 0.8 0.2 - 1.3 mg/dL    Albumin 2.0 (L) 3.4 - 5.0 g/dL    Protein Total 6.2 (L) 6.8 - 8.8 g/dL    Alkaline Phosphatase 93 40 - 150 U/L    ALT 15 0 - 70 U/L    AST 20 0 - 45 U/L   CBC with platelets differential   Result Value Ref Range    WBC 15.7 (H) 4.0 - 11.0 10e9/L    RBC Count 4.53 4.4 - 5.9 10e12/L    Hemoglobin 14.9 13.3 - 17.7 g/dL    Hematocrit 42.1 40.0 - 53.0 %    MCV 93 78 - 100 fl    MCH 32.9 26.5 - 33.0 pg    MCHC 35.4 31.5 - 36.5 g/dL    RDW 13.7 10.0 - 15.0 %    Platelet Count 253 150 - 450 10e9/L    Diff Method Automated Method     % Neutrophils 85.1 %    % Lymphocytes 3.3 %    % Monocytes 9.6 %    % Eosinophils 0.1 %    % Basophils 0.2 %    % Immature Granulocytes 1.7 %    Nucleated RBCs 0 0 /100    Absolute Neutrophil 13.4 (H) 1.6 - 8.3 10e9/L    Absolute Lymphocytes 0.5 (L) 0.8 - 5.3 10e9/L    Absolute Monocytes 1.5 (H) 0.0 - 1.3 10e9/L    Absolute Eosinophils 0.0 0.0 - 0.7 10e9/L    Absolute Basophils 0.0 0.0 - 0.2 10e9/L    Abs Immature Granulocytes 0.3 0 - 0.4 10e9/L    Absolute Nucleated RBC 0.0    Blood culture   Result Value Ref Range    Specimen Description Blood Left Arm     Special Requests Aerobic and anaerobic bottles received     Culture Micro No growth after 17 hours    Procalcitonin   Result Value Ref Range    Procalcitonin 5.22 ng/ml   ISTAT gases lactate neha POCT   Result Value Ref Range    Ph Venous 7.43 7.32 - 7.43 pH    PCO2 Venous 34 (L) 40 - 50 mm Hg    PO2 Venous 59  (H) 25 - 47 mm Hg    Bicarbonate Venous 23 21 - 28 mmol/L    O2 Sat Venous 91 %    Lactic Acid 2.2 (H) 0.7 - 2.1 mmol/L   Chest XR,  PA & LAT    Narrative    CHEST TWO VIEWS  9/15/2017 10:29 PM     COMPARISON: None.    HISTORY: Cough and hypoxic.      Impression    IMPRESSION: There is extensive airspace opacity throughout the left  lower lobe. The lungs are otherwise clear. There is no pleural  effusion or pneumothorax. Heart size is normal with no evidence for  congestive failure.    JAYDON GOMES MD   Blood culture   Result Value Ref Range    Specimen Description Blood Right Arm     Special Requests Aerobic and anaerobic bottles received     Culture Micro No growth after 17 hours    UA with Microscopic   Result Value Ref Range    Color Urine Yellow     Appearance Urine Slightly Cloudy     Glucose Urine Negative NEG^Negative mg/dL    Bilirubin Urine Negative NEG^Negative    Ketones Urine Negative NEG^Negative mg/dL    Specific Gravity Urine 1.019 1.003 - 1.035    Blood Urine Moderate (A) NEG^Negative    pH Urine 5.5 5.0 - 7.0 pH    Protein Albumin Urine 30 (A) NEG^Negative mg/dL    Urobilinogen mg/dL Normal 0.0 - 2.0 mg/dL    Nitrite Urine Negative NEG^Negative    Leukocyte Esterase Urine Negative NEG^Negative    Source Catheterized Urine     WBC Urine 13 (H) 0 - 2 /HPF    RBC Urine 7 (H) 0 - 2 /HPF    Mucous Urine Present (A) NEG^Negative /LPF    Hyaline Casts 4 (H) 0 - 2 /LPF   Urine Culture Aerobic Bacterial   Result Value Ref Range    Specimen Description Midstream Urine     Special Requests Specimen received in preservative     Culture Micro Culture negative < 24 hours, reincubate    Lactic acid   Result Value Ref Range    Lactic Acid 1.6 0.4 - 2.0 mmol/L   Strep pneumo Agn Ur less than 13yrs or CSF any age   Result Value Ref Range    Specimen Description Urine     BAD S pneumoniae       Canceled, Test credited  Test canceled - Lab  error      BAD S pneumoniae See BASTP    Strep pneumo Agn Ur  greater or equal to 13yrs or CSF any age   Result Value Ref Range    Specimen Description Urine     S Pneumoniae Antigen       Negative, no Streptococcus pneumoniae antigen detected by immunochromatographic membrane   assay. A negative Streptococcus pneumoniae antigen result does not rule out infection with   Streptococcus pneumoniae.     Sputum Culture Aerobic Bacterial   Result Value Ref Range    Specimen Description Sputum     Culture Micro (A)      >10 Squamous epithelial cells/low power field indicates oral contamination. Please   recollect.      Culture Micro Canceled, Test credited     Culture Micro       Notification of test cancellation was given to  Victorina Owens RN SH66, @7089 9/16/17..     Gram stain   Result Value Ref Range    Specimen Description Sputum     Special Requests screen     Gram Stain (A)      >10 Squamous epithelial cells/low power field indicates oral contamination. Please   recollect.      Gram Stain <25 PMNs/low power field     Gram Stain Many  Mixed Gram positive bacteria present.   (A)    Basic metabolic panel   Result Value Ref Range    Sodium 139 133 - 144 mmol/L    Potassium 3.9 3.4 - 5.3 mmol/L    Chloride 104 94 - 109 mmol/L    Carbon Dioxide 27 20 - 32 mmol/L    Anion Gap 8 3 - 14 mmol/L    Glucose 98 70 - 99 mg/dL    Urea Nitrogen 69 (H) 7 - 30 mg/dL    Creatinine 1.36 (H) 0.66 - 1.25 mg/dL    GFR Estimate 50 (L) >60 mL/min/1.7m2    GFR Estimate If Black 61 >60 mL/min/1.7m2    Calcium 8.0 (L) 8.5 - 10.1 mg/dL   Nutrition Services Adult IP Consult    Narrative    Katherine Garcia RD, LD     9/16/2017  9:54 AM  CLINICAL NUTRITION SERVICES  -  ASSESSMENT NOTE      RECOMMENDATIONS FOR MD/PROVIDER TO ORDER:     RECOMMEND SLP DEV (pt notes difficulty swallowing solids and   liquids)       Recommendations Ordered by Registered Dietitian (RD):     Will make pt Room Service with Assist (he will be seen daily for   meal orders)    Will send a strawberry PLUS2 milkshake with every  "meal (each   provides 625 omero and 19 gm pro)    Ordered current wt       Malnutrition:   % Weight Loss:  Weight loss does not meet criteria for   malnutrition (3% in the past month and up from 1 yr ago)  % Intake:  No decreased intake noted (pt states he is eating per   usual - but suspect inadequate)  Subcutaneous Fat Loss:  Upper arm region  - severe depletion  Muscle Loss:  Temporal region  - severe depletion, Clavicle bone   region  - severe depletion and Acromion bone region  - severe   depletion  Fluid Retention:  None noted    Malnutrition Diagnosis: Severe malnutrition  In Context of:  Chronic illness or disease          REASON FOR ASSESSMENT  Bong Noguera is a 82 year old male seen by Registered   Dietitian for Provider Order - \"weight loss\"      NUTRITION HISTORY  Visited with pt this morning   Seemed a bit crabby and not very forthcoming with information -   not wanting to answer my questions  Notes that he has had difficulty swallowing \"for years\"  Says that he even has some trouble swallowing water  States that he tries to eat 4 times per day  Makes himself strawberry malts  Likes to have a blueberry muffin for breakfast  Does not like eggs  Drinks malts instead of milk  Does not note any decrease in his typical intake      CURRENT NUTRITION ORDERS  Diet Order:     Regular     Pt questioning if he will be able to get any breakfast down with   his difficulty swallowing  Ordered him a blueberry muffin, strawberry PLUS2 milkshake and   orange wedges (per his request)      PHYSICAL FINDINGS  Observed  Muscle Wasting  - temples, cheeks, clavicle, shoulder  Subcutaneous fat loss  - upper arms  Obtained from Chart/Interdisciplinary Team  Cachectic    ANTHROPOMETRICS  Height: 6'5\"  Weight: No wt taken since admit (put in order for current wt)  IBW: 94.5 kg  Weight History: Pt states he has a scale at home and has weighed   himself in the past few days - \"want to say it was 137#\"  (62.3   kg).  Pt has had " gradual wt loss over the past few years - but   seems to have leveled off ~140# (was in the 130s last year).    Note pt dehydrated on admit.  Wt Readings from Last 10 Encounters:   08/10/17 64 kg (141 lb)   07/20/17 63.5 kg (140 lb)   05/30/17 65.4 kg (144 lb 3.2 oz)   08/16/16 61.2 kg (135 lb)   07/07/16 59.9 kg (132 lb)   06/30/16 61.5 kg (135 lb 9.6 oz)   02/04/16 61.7 kg (136 lb)   10/09/14 67.8 kg (149 lb 6.4 oz)   06/05/14 69.5 kg (153 lb 3.2 oz)   04/24/14 70.9 kg (156 lb 6.4 oz)         LABS  Labs reviewed    MEDICATIONS  IVF (NaCl) at 100 mL/hr    Dosing Weight 62.3 kg (pt's reported wt)    ASSESSED NUTRITION NEEDS PER APPROVED PRACTICE GUIDELINES:  Estimated Energy Needs: 7567-0406 kcals (35-40 Kcal/Kg)  Justification: underweight  Estimated Protein Needs: 75-95 grams protein (1.2-1.5 g pro/Kg)  Justification: Repletion    MALNUTRITION:  % Weight Loss:  Weight loss does not meet criteria for   malnutrition (3% in the past month and up from 1 yr ago)  % Intake:  No decreased intake noted (pt states he is eating per   usual - but suspect inadequate)  Subcutaneous Fat Loss:  Upper arm region  - severe depletion  Muscle Loss:  Temporal region  - severe depletion, Clavicle bone   region  - severe depletion and Acromion bone region  - severe   depletion  Fluid Retention:  None noted    Malnutrition Diagnosis: Severe malnutrition  In Context of:  Chronic illness or disease    NUTRITION DIAGNOSIS:  Predicted inadequate nutrient intake related to swallowing   difficulty as evidenced by pt with muscle and fat wasting      NUTRITION INTERVENTIONS  Recommendations / Nutrition Prescription  Regular diet  Nutrition supplements    RECOMMEND SLP EVAL (pt notes difficulty swallowing solids and   liquids)  .      Implementation  Nutrition education ---> Reviewed current diet order and   supplements  Will make pt Room Service with Assist (he will be seen daily for   meal orders)  Will send a strawberry PLUS2 milkshake with  every meal (each   provides 625 omero and 19 gm pro)  Ordered a current wt  .      Nutrition Goals  Pt to consume 50% meals and 100% of the shakes being sent with   meals  .      MONITORING AND EVALUATION:  Progress towards goals will be monitored and evaluated per   protocol and Practice Guidelines                 Lactic acid level STAT   Result Value Ref Range    Lactic Acid 1.6 0.7 - 2.0 mmol/L[MF1.3]       LABS:   No results found for: PSA  UA RESULTS:  Recent Labs   Lab Test  09/15/17   2252  07/20/17   1355   COLOR  Yellow  Red*   APPEARANCE  Slightly Cloudy   --    URINEGLC  Negative   --    URINEBILI  Negative   --    URINEKETONE  Negative   --    SG  1.019  1.020   UBLD  Moderate*   --    URINEPH  5.5  6.5   PROTEIN  30*   --    UROBILINOGEN   --   1    NITRITE  Negative  Neg   LEUKEST  Negative   --    RBCU  7*  packed   WBCU  13*  0     Creatinine   Date Value Ref Range Status   09/16/2017 1.36 (H) 0.66 - 1.25 mg/dL Final     ]  Hemoglobin   Date Value Ref Range Status   09/15/2017 14.9 13.3 - 17.7 g/dL Final   ]      ASSESSMENT:   1. Pneumonia due to infectious organism, unspecified laterality, unspecified part of lung    2. Acute respiratory failure with hypoxia (H)    3. Severe sepsis (H)    4. History of COPD    5. Acute kidney injury (H)    6. Abnormal urinalysis    7. HISTORY UNTREATED PAPILLARY BLADDER TUMOR      PLAN:    1. APPROPRIATE MANAGEMENT OF LIKELY LLL PNEUMONIA PER PRIMARY  2. WOULD ADVISE CT CHEST , ABD, AND PELVIS THIS HOSPITAL STAY. HOPEFULLY WITH IV CONTRAST AFTER DEHYDRATION STATUS REVERSED.  3. MAY BE BEST TO CONSIDER TURBT PRIOR TO DISMISSAL IF PATIENT ALLOWS AND MEDICALLY STABLE/ CLEARED FOR PROCEDURE.  4. WE WILL FOLLOW CLOSELY AS HE IMPROVES. WILL DISCUSS WITH DR MARLEY.    Leobardo Jean Baptiste[MF1.1]     Revision History        User Key Date/Time User Provider Type Action    > MF1.3 9/16/2017  7:24 PM Leobardo Jean Baptiste MD Physician Sign     MF1.2 9/16/2017  7:14 PM Leobardo Jean Baptiste  "MD Klaus Physician      MF1.1 9/16/2017  7:06 PM Leobardo Jean Baptiste MD Physician             Consults by Katherine Garcia RD, LD at 9/16/2017  9:51 AM     Author:  Katherine Garcia RD, LD Service:  Nutrition Author Type:  Registered Dietitian    Filed:  9/16/2017  9:54 AM Date of Service:  9/16/2017  9:51 AM Creation Time:  9/16/2017  9:36 AM    Status:  Signed :  Katherine Garcia RD, LD (Registered Dietitian)     Consult Orders:    1. Nutrition Services Adult IP Consult [[id number]] ordered by Joseph Salvador MD at 09/16/17 0900                CLINICAL NUTRITION SERVICES  -  ASSESSMENT NOTE      RECOMMENDATIONS FOR MD/PROVIDER TO ORDER:     RECOMMEND SLP EVAL (pt notes difficulty swallowing solids and liquids)       Recommendations Ordered by Registered Dietitian (ROSALBA):     Will make pt Room Service with Assist[SJ1.1] (he will be seen daily for meal orders)    Will send a strawberry PLUS2 milkshake with every meal (each provides 625 omero and 19 gm pro)    Ordered current wt[SJ1.2]       Malnutrition:   % Weight Loss:  Weight loss does not meet criteria for malnutrition (3% in the past month and up from 1 yr ago)  % Intake:  No decreased intake noted (pt states he is eating per usual - but suspect inadequate)  Subcutaneous Fat Loss:  Upper arm region  - severe depletion  Muscle Loss:  Temporal region  - severe depletion, Clavicle bone region  - severe depletion and Acromion bone region  - severe depletion  Fluid Retention:  None noted    Malnutrition Diagnosis: Severe malnutrition  In Context of:  Chronic illness or disease          REASON FOR ASSESSMENT  Bong Noguera is a 82 year old male seen by Registered Dietitian for Provider Order - \"weight loss\"      NUTRITION HISTORY  Visited with pt this morning   Seemed a bit crabby and not very forthcoming with information - not wanting to answer my questions  Notes that he has had difficulty swallowing \"for years\"  Says that he even has " "some trouble swallowing water  States that he tries to eat 4 times per day  Makes himself strawberry malts  Likes to have a blueberry muffin for breakfast  Does not like eggs  Drinks malts instead of milk  Does not note any decrease in his typical intake      CURRENT NUTRITION ORDERS  Diet Order:     Regular     Pt questioning if he will be able to get any breakfast down with his difficulty swallowing  Ordered him a blueberry muffin, strawberry PLUS2 milkshake and orange wedges (per his request)      PHYSICAL FINDINGS  Observed  Muscle Wasting  - temples, cheeks, clavicle, shoulder  Subcutaneous fat loss  - upper arms  Obtained from Chart/Interdisciplinary Team  Cachectic    ANTHROPOMETRICS  Height: 6'5\"  Weight: No wt taken since admit (put in order for current wt)  IBW: 94.5 kg  Weight History: Pt states he has a scale at home and has weighed himself in the past few days - \"want to say it was 137#\"  (62.3 kg).  Pt has had gradual wt loss over the past few years - but seems to have leveled off ~140# (was in the 130s last year).  Note pt dehydrated on admit.[SJ1.1]  Wt Readings from Last 10 Encounters:   08/10/17 64 kg (141 lb)   07/20/17 63.5 kg (140 lb)   05/30/17 65.4 kg (144 lb 3.2 oz)   08/16/16 61.2 kg (135 lb)   07/07/16 59.9 kg (132 lb)   06/30/16 61.5 kg (135 lb 9.6 oz)   02/04/16 61.7 kg (136 lb)   10/09/14 67.8 kg (149 lb 6.4 oz)   06/05/14 69.5 kg (153 lb 3.2 oz)   04/24/14 70.9 kg (156 lb 6.4 oz)[SJ1.3]         LABS  Labs reviewed    MEDICATIONS  IVF (NaCl) at 100 mL/hr    Dosing Weight 62.3 kg (pt's reported wt)    ASSESSED NUTRITION NEEDS PER APPROVED PRACTICE GUIDELINES:  Estimated Energy Needs: 8623-9514 kcals (35-40 Kcal/Kg)  Justification: underweight  Estimated Protein Needs: 75-95 grams protein (1.2-1.5 g pro/Kg)  Justification: Repletion    MALNUTRITION:  % Weight Loss:  Weight loss does not meet criteria for malnutrition (3% in the past month and up from 1 yr ago)  % Intake:  No decreased " intake noted (pt states he is eating per usual - but suspect inadequate)  Subcutaneous Fat Loss:  Upper arm region  - severe depletion  Muscle Loss:  Temporal region  - severe depletion, Clavicle bone region  - severe depletion and Acromion bone region  - severe depletion  Fluid Retention:  None noted    Malnutrition Diagnosis: Severe malnutrition  In Context of:  Chronic illness or disease    NUTRITION DIAGNOSIS:  Predicted inadequate nutrient intake related to swallowing difficulty as evidenced by pt with muscle and fat wasting      NUTRITION INTERVENTIONS  Recommendations / Nutrition Prescription  Regular diet  Nutrition supplements    RECOMMEND SLP EVAL (pt notes difficulty swallowing solids and liquids)  .      Implementation  Nutrition education ---> Reviewed current diet order and supplements  Will make pt Room Service with Assist[SJ1.1] (he will be seen daily for meal orders)  Will send a strawberry PLUS2 milkshake with every meal (each provides 625 omero and 19 gm pro)  Ordered a current wt  .[SJ1.2]      Nutrition Goals[SJ1.1]  Pt to consume 50% meals and 100% of the shakes being sent with meals[SJ1.2]  .      MONITORING AND EVALUATION:[SJ1.1]  Progress towards goals will be monitored and evaluated per protocol and Practice Guidelines[SJ1.2]                   Revision History        User Key Date/Time User Provider Type Action    > SJ1.2 9/16/2017  9:54 AM Katherine Garcia RD, LD Registered Dietitian Sign     SJ1.3 9/16/2017  9:43 AM Katherine Garcia RD, JESSIE Registered Dietitian      SJ1.1 9/16/2017  9:36 AM Katherine Garcia RD, LD Registered Dietitian                      Progress Notes - Physician (Notes from 09/26/17 through 09/29/17)      Progress Notes by ZEYAD ULLOA at 9/29/2017  6:08 PM     Author:  ZEYAD ULLOA Service:  Respiratory Therapy Author Type:  Respiratory Therapist    Filed:  9/29/2017  6:09 PM Date of Service:  9/29/2017  6:08 PM Creation Time:  9/29/2017  6:08 PM     Status:  Signed :  AMY JARA, RT (Respiratory Therapist)         Patient is on 3L bubbler with SpO2 in the low 90's. BS diminished and coarse at times. All nebs were given as ordered.  Will cont to follow.[SB1.1]  9/29/2017[SB1.2]  Amy Jara RRT[SB1.1]     Revision History        User Key Date/Time User Provider Type Action    > SB1.2 9/29/2017  6:09 PM AMY JARA Respiratory Therapist Sign     SB1.1 9/29/2017  6:08 PM AMY JARA Respiratory Therapist             Progress Notes by Donna Bishop, RN at 9/29/2017  4:50 PM     Author:  Donna Bishop RN Service:  Hospice Author Type:  Registered Nurse    Filed:  9/29/2017  4:57 PM Date of Service:  9/29/2017  4:50 PM Creation Time:  9/29/2017  4:50 PM    Status:  Addendum :  Donna Bishop RN (Registered Nurse)          Hospice: Met with pts nephew Brooks to sign the hospice consent forms. The plan is for the pt to be discharged to [facility] with a 6.30pm [facility] Stretcher . I have ordered liquid oxygen from [facility] and Magdiel from [facility] told me the oxygen would be able to be delivered by 6:30pm to the facility. Patient may need an xtra long bed and that will be determined tomorrow when the  Hospice team completes the admission at the facility at 1p. Brooks was given copies of his signed hospice consent forms along with the hospice handbook with the 24 hr number. Hospice discharge meds have been filled. POLST completed but will need the hospice medical director signature. The hospice poc and meds have been reviewed with Annie MENDEZ Np. Thank you.[HR1.1]Donna Bishop RN, BSN  [facility] Admission nurse  [phone][HR1.2]     Revision History        User Key Date/Time User Provider Type Action    > [N/A] 9/29/2017  4:57 PM Donna Bishop, RN Registered Nurse Addend     [N/A] 9/29/2017  4:57 PM Donna Bishop, RN Registered Nurse Addend     HR1.2 9/29/2017  4:57 PM Donna Bishop, RN Registered Nurse Sign      HR1.1 9/29/2017  4:50 PM Donna Bishop, RN Registered Nurse             Progress Notes by Shonda Zurita LSW at 9/29/2017 10:07 AM     Author:  Shonda Zurita LSW Service:  (none) Author Type:      Filed:  9/29/2017  3:36 PM Date of Service:  9/29/2017 10:07 AM Creation Time:  9/29/2017 10:07 AM    Status:  Addendum :  Shonda Zurita LSW ()         JEFF  I: JEFF called and left messages with [facility]. JEFF called [facility] and was updated that family would need to have a check for $7500 upon admission and then would be reimbursed for the days that were not used if the [facility] covers hospice benefit. JEFF will update family.[SM1.1] [facility] will call SW back to confirm a bed is available.[SM1.2]    P: SW will continue to follow and assist as needed.[SM1.1]    ADDENDUM  I: JEFF spoke with nephew and he is ok with the fee required upon admission. Brooks stated to take the first bed that accepts and has a bed available. Brooks will be at hospital around 1500 to sign consent with  hospice. Hospice RN is putting in med reccs. Meds will need to be filled at hospital (no ranges and bubble packed). JEFF will update nephew once bed is confirmed.[SM1.3]     ADDENDUM  I: JEFF was updated that [facility] can accept patient today. JEFF will arrange stretcher for patient due to patient requiring O2 and being unable to regulate by self as well as being on bedrest and d/cing on hospice. JEFF arranged stretcher for 1830. JEFF will fax orders/PAS when complete.[SM1.4]     PAS-RR    D: Per DHS regulation, JEFF completed and submitted PAS-RR to [place] [employer] Direct Connect via the [employer].  PAS-RR confirmation # is : [id number]      I: JEFF spoke with nephew and they are aware a PAS-RR has been submitted.  JEFF reviewed with nephew that they may be contacted for a follow up appointment within 10 days of hospital discharge if their SNF stay is < 30 days.  Contact information for Senior  LinkAge Line was also provided.    A: nephew verbalized understanding.    P: Further questions may be directed to Senior Link[employer] Line at #[phone], option #4 for PAS- staff.[SM1.5]      SW confirmed d/c plan with nephew. JEFF also called [facility] contact Mirta to ensure they follow up with patient after he d/c;s from hospital. JEFF provided [facility] with address of d/c facility, admissions number, and nephews information. JEFF will also give nephew VA information. Orders were sent via DOD.[SM1.6]     ROSALEE Su   *[phone][SM1.1]       Revision History        User Key Date/Time User Provider Type Action    > SM1.6 9/29/2017  3:36 PM Shonda Zurita LSW  Addend     SM1.5 9/29/2017  3:10 PM Shonda Zurita LSW  Addend     SM1.4 9/29/2017  2:52 PM Shonda Zurita LSW  Addend     SM1.3 9/29/2017 10:42 AM Shonda Zurita LSW  Addend     [N/A] 9/29/2017 10:11 AM Shonda Zurita LSW  Addend     SM1.2 9/29/2017 10:11 AM Shonda Zurita LSW  Sign     SM1.1 9/29/2017 10:07 AM Shonda Zurita LSW              Progress Notes by Donna Bishop RN at 9/29/2017 10:46 AM     Author:  Dario, Donna, RN Service:  Hospice Author Type:  Registered Nurse    Filed:  9/29/2017 10:49 AM Date of Service:  9/29/2017 10:46 AM Creation Time:  9/29/2017 10:46 AM    Status:  Signed :  Donna Bishop RN (Registered Nurse)         [facility]: In anticipation for hospice discharge to a skilled nursing facility with hospice cares please consider ordering the following hospice comfort meds at discharge:  Morphine sulfate 20mg/ml give 10mg or 0.5ml SL every 2 hrs as needed for pain/dyspnea  Lorazepam 0.25mg PO/SL every 4 hours PRN anxiety/restlessness  Haloperidol 0.5mg PO/SL every 6 hours PRN agitation/nausea  Atropine 1% 2 drops PO/SL every 2 hours PRN terminal congestion  Bisacodyl supp 10mg PA daily PRN  constipation  Acetaminophen supp 650mg AL every 4 hours PRN fever/mild pain  Senna 8.6mg 1 tab po 2x day as needed for constipation  Thank you.Donna Bishop RN, BSN  [facility] Admission nurse  [phone]   [HR1.1]     Revision History        User Key Date/Time User Provider Type Action    > HR1.1 9/29/2017 10:49 AM Donna Bishop, RN Registered Nurse Sign            Progress Notes by Saul King RT at 9/29/2017  3:25 AM     Author:  Saul King RT Service:  Respiratory Therapy Author Type:  Respiratory Therapist    Filed:  9/29/2017  3:25 AM Date of Service:  9/29/2017  3:25 AM Creation Time:  9/29/2017  3:25 AM    Status:  Signed :  Saul King RT (Respiratory Therapist)         Pt stable on 2 lpm nc. BBS diminished/coarse. Will continue to follow.     Saul King RT[TK1.1]      Revision History        User Key Date/Time User Provider Type Action    > TK1.1 9/29/2017  3:25 AM Saul King RT Respiratory Therapist Sign            Progress Notes by AMY JARA at 9/28/2017  6:21 PM     Author:  AMY JARA Service:  Respiratory Therapy Author Type:  Respiratory Therapist    Filed:  9/28/2017  6:23 PM Date of Service:  9/28/2017  6:21 PM Creation Time:  9/28/2017  6:21 PM    Status:  Signed :  AMY JARA RT (Respiratory Therapist)         Patient is on a 2L NC with SpO2 in the low 90's. BS diminished. All nebs were given as ordered.  Will cont to follow.[SB1.1]  9/28/2017[SB1.2]  Amy Jara RRT[SB1.1]     Revision History        User Key Date/Time User Provider Type Action    > SB1.2 9/28/2017  6:23 PM AMY JARA Respiratory Therapist Sign     SB1.1 9/28/2017  6:21 PM AMY JARA Respiratory Therapist             Progress Notes by David Olivarez MD at 9/28/2017  4:37 PM     Author:  David Olivarez MD Service:  Hospitalist Author Type:  Physician    Filed:  9/28/2017  4:39 PM Date of Service:  9/28/2017  4:37 PM Creation Time:  9/28/2017   4:37 PM    Status:  Signed :  David Olivarez MD (Physician)         [facility]    Hospitalist Progress Note    Assessment & Plan   Summary: Bong Noguera is a 82 year old male with PMH COPD, suspected bladder cancer, BPH who was admitted on 9/15/2017 with acute hypoxic respiratory failure and severe sepsis. FTH elevated troponins and possible acute systolic CHF exacerbation. Transitioned to comfort cares, awaiting [facility] long term care placement.    Goals of care: During this admission patient has refused PEG placement, aggressive measures, and has indicated that he prefers comfort cares measures. He has refused PT evaluation and has not been fully evaluated for TCU. Has failed swallow study with significant aspiration risk. Patient has acknowledged his aspiration risk and has decided to continue eating. Nephew is POA and has been helping patient with this transition process. Palliative consulted, now signed off.  - O2, morphine, atropine SL for comfort  - Mirtazapine for sleep, appetite    Acute hypoxemic respiratory failure  LLL PNA  COPD exacerbation  On admission noted hypotension, elevated lactate, elevated procalcitonin with large LLL infiltrate, and increased O2 reqs on HF. Noted WBC increase on 9/22 to 24.3k. Treated with a total 10 day course starting with Ceft/Azithro -> Zosyn, ending 9/24/17. Given IV steroids for possible COPD component. On 9/25 patient appears symptomatically improved however no further evaluations given comfort cares approach. Sputum cx with Candida, likely contaminant, Bcx NGTD.   - Continue Fluticasone and Spiriva    Chronic/Resolved  Demand ischemia: Elevated troponins in the setting of sepsis. TTE showed EF 30-35% and inferoapical akinesis which is new. Cardiology consulted and have recommended medication management.  ROBINSON, improved.  AAA 7cm: Further treatment deferred  Bladder mass: Dx in 2016 but reportedly unable to make appt for TURBT. Hematuria was  reported at home. Further evaluation stopped due to comfort cares.  - Cont Tamsulosin    DVT Prophylaxis: Pneumatic Compression Devices  Code Status: DNR/DNI  PT/OT: OT ordered    Disposition: Expected discharge to [facility] long term [facility] hospice when able, patient is medically stable    David Olivarez MD  Text Page  (7am to 6pm)  Interval History   Patient sleeping comfortably, did not disturb.    -Data reviewed today: I reviewed all new labs and imaging results over the last 24 hours. I personally reviewed the chest x-ray image(s) showing and CT showing left PNA. Barium swallow showing aspiration.    Physical Exam             Resp: 18 SpO2: 92 % O2 Device: Nasal cannula Oxygen Delivery: 2 LPM  Vitals:    09/22/17 0658 09/23/17 0536   Weight: 65 kg (143 lb 4.8 oz) 65.5 kg (144 lb 6.4 oz)     Vital Signs with Ranges  Resp:  [18-20] 18  SpO2:  [91 %-93 %] 92 %  I/O last 3 completed shifts:  In: -   Out: 900 [Urine:900]    Constitutional: Cachectic male in NAD    Medications     - MEDICATION INSTRUCTIONS -         mirtazapine  7.5 mg Oral At Bedtime     oxymetazoline  2 spray Both Nostrils BID     influenza Vac Split High-Dose  0.5 mL Intramuscular Prior to discharge     albuterol  3 mL Nebulization Q4H While awake     sodium chloride (PF)  10 mL Intracatheter Once     fluticasone furoate  1 puff Inhalation Daily     tiotropium  18 mcg Inhalation Daily     tamsulosin  0.4 mg Oral Weekly       Data   No more labs or imaging given comfort cares[SF1.1]     Revision History        User Key Date/Time User Provider Type Action    > SF1.1 9/28/2017  4:39 PM David Olivarez MD Physician Sign            Progress Notes by Sonja Jasmine LSW at 9/28/2017  3:21 PM     Author:  Sonja Jasmine LSW Service:  (none) Author Type:      Filed:  9/28/2017  3:31 PM Date of Service:  9/28/2017  3:21 PM Creation Time:  9/28/2017  3:21 PM    Status:  Addendum :  Sonja Jasmine LSW ()         JEFF  I: JEFF  met with patient's nephew to further discuss discharge planning. Patient's nephew in agreement with patient going to a LTC contracted [facility] facility and privately paying until VA benefits become effective. Discussed VA contracted facilities, and patient's nephew would like referrals sent to [facility] due to location.[LS1.1] Patient's nephew stating they prefer a facility that accepts a credit card over a check.[LS1.2] SW sent referrals per protocol.  P: SW will continue to follow.     GENNY Zamora, BEN[LS1.1]     Revision History        User Key Date/Time User Provider Type Action    > LS1.2 9/28/2017  3:31 PM Sonja Jasmine LSW  Addend     LS1.1 9/28/2017  3:24 PM Sonja Jasmine LSW  Sign            Progress Notes by Ronnie Napier RN at 9/28/2017 10:55 AM     Author:  Ronnie Napier RN Service:  WO Nurse Author Type:  Registered Nurse    Filed:  9/28/2017 10:58 AM Date of Service:  9/28/2017 10:55 AM Creation Time:  9/28/2017 10:55 AM    Status:  Signed :  Ronnie Napier RN (Registered Nurse)         Pt is on comfort cares and waiting for placement. WOCN spoke to bedside nurse today who reports blanchable redness with open areas and not appropriate to assess at this time. WOCN discussed with bedside nurse appropriate Pressure Prevention Interventions and to notify if area becomes non blanchable or open. Skin not assessed today, will continue to check in with nursing.[AN1.1]          Revision History        User Key Date/Time User Provider Type Action    > AN1.1 9/28/2017 10:58 AM Ronnie Napier RN Registered Nurse Sign            Progress Notes by Shonda Zurita LSW at 9/28/2017 10:30 AM     Author:  Shonda Zurita LSW Service:  (none) Author Type:      Filed:  9/28/2017 10:47 AM Date of Service:  9/28/2017 10:30 AM Creation Time:  9/28/2017 10:30 AM    Status:  Signed :  Shonda Zurita LSW ()          JEFF  I: JEFF called [employer] to check on status of  Form. Staff stated it will be sent Oct 2nd. JEFF will discuss with patient about private paying until VA can get patient coverage or applying for MA. JEFF spoke with Nephew who is open to having patient private pay for LTC at a VA contracted facility until VA hospice benefit is effective. Nephew is planning to meet with JEFF to discuss further around 1200. JEFF called Mirta with the [facility] to update. Mirta was unsure about the process of private paying until VA benefit is effective and transferred SW to [facility]. JEFF left message and awaits a call back to confirm information.    P: JEFF will continue to follow and assist as needed.    ROSALEE Su   *[phone][SM1.1]       Revision History        User Key Date/Time User Provider Type Action    > SM1.1 9/28/2017 10:47 AM Shonda Zurita LSW  Sign            Progress Notes by Swapna Anderson RT at 9/27/2017  7:18 PM     Author:  Swapna Anderson RT Service:  Respiratory Therapy Author Type:  Respiratory Therapist    Filed:  9/27/2017  7:21 PM Date of Service:  9/27/2017  7:18 PM Creation Time:  9/27/2017  7:18 PM    Status:  Signed :  Swapna Anderson RT (Respiratory Therapist)         Patient is on 2 LPM NC and SpO2 low 90`s. Scheduled neb tx given and tolerated well. BBS diminished pre and post. Pt has strong productive cough. Cough up moderate amount of yellow and thick secretions. Will continue to follow.[SK1.1]     Revision History        User Key Date/Time User Provider Type Action    > SK1.1 9/27/2017  7:21 PM Swapna Anderson RT Respiratory Therapist Sign            Progress Notes by David Olivarez MD at 9/27/2017 12:31 PM     Author:  David Olivarez MD Service:  Hospitalist Author Type:  Physician    Filed:  9/27/2017 12:35 PM Date of Service:  9/27/2017 12:31 PM Creation Time:  9/27/2017 12:31 PM    Status:  Signed :  David Olivarez MD  (Physician)         [facility]    Hospitalist Progress Note    Assessment & Plan   Summary: Bong Noguera is a 82 year old male with PMH COPD, suspected bladder cancer, BPH who was admitted on 9/15/2017 with acute hypoxic respiratory failure and severe sepsis. FTH elevated troponins and possible acute systolic CHF exacerbation. Transitioned to comfort cares, awaiting [facility] long term care placement.    Goals of care: During this admission patient has refused PEG placement, aggressive measures, and has indicated that he prefers comfort cares measures. He has refused PT evaluation and has not been fully evaluated for TCU. Has failed swallow study with significant aspiration risk. Patient has acknowledged his aspiration risk and has decided to continue eating. Nephew is POA and has been helping patient with this transition process. Palliative consulted, now signed off.  - O2, morphine, atropine SL for comfort  - Following patient discussion with order OT to assess upper extremity function, as well as psychology given acute stress related to hospitalization    Acute hypoxemic respiratory failure  LLL PNA  COPD exacerbation  On admission noted hypotension, elevated lactate, elevated procalcitonin with large LLL infiltrate, and increased O2 reqs on HF. Noted WBC increase on 9/22 to 24.3k. Treated with a total 10 day course starting with Ceft/Azithro -> Zosyn, ending 9/24/17. Given IV steroids for possible COPD component. On 9/25 patient appears symptomatically improved however no further evaluations given comfort cares approach. Sputum cx with Candida, likely contaminant, Bcx NGTD.   - Continue Fluticasone and Spiriva    Demand ischemia: Elevated troponins in the setting of sepsis. TTE showed EF 30-35% and inferoapical akinesis which is new. Cardiology consulted and have recommended medication management.  ROBINSON, improved.  AAA 7cm: Further treatment deferred  Bladder mass: Dx in 2016 but reportedly unable  "to make appt for TURBT. Hematuria was reported at home. Further evaluation stopped due to comfort cares.  - Cont Tamsulosin    DVT Prophylaxis: Pneumatic Compression Devices  Code Status: DNR/DNI  PT/OT: OT ordered    Disposition: Expected discharge to [facility] long term care hospice when able, patient is medically stable    David Olivarez MD  Text Page  (7am to 6pm)  Interval History   Patient feeling well today. He endorses feeling trapped in the hospital because he can't walk. He asks, \"can you find someone to kill me\" as a joke. Patient denies SI. Afrin helps.    -Data reviewed today: I reviewed all new labs and imaging results over the last 24 hours. I personally reviewed the chest x-ray image(s) showing and CT showing left PNA. Barium swallow showing aspiration.    Physical Exam             Resp: 18 SpO2: 92 % O2 Device: Nasal cannula Oxygen Delivery: 2 LPM  Vitals:    09/22/17 0658 09/23/17 0536   Weight: 65 kg (143 lb 4.8 oz) 65.5 kg (144 lb 6.4 oz)     Vital Signs with Ranges  Resp:  [18] 18  SpO2:  [91 %-97 %] 92 %  I/O last 3 completed shifts:  In: -   Out: 845 [Urine:845]    Constitutional: Cachectic male in NAD  HEENT: Normocephalic, atraumatic, eyes nonicteric, oral mucosa moist  Skin/Integumen: No rashes or scars  Neuro/Psych: Appropriate affect and mood.    Medications     - MEDICATION INSTRUCTIONS -         oxymetazoline  2 spray Both Nostrils BID     sodium chloride (PF)  3 mL Intracatheter Q8H     influenza Vac Split High-Dose  0.5 mL Intramuscular Prior to discharge     albuterol  3 mL Nebulization Q4H While awake     sodium chloride (PF)  10 mL Intracatheter Once     fluticasone furoate  1 puff Inhalation Daily     tiotropium  18 mcg Inhalation Daily     tamsulosin  0.4 mg Oral Weekly       Data   No more labs or imaging given comfort cares[SF1.1]     Revision History        User Key Date/Time User Provider Type Action    > SF1.1 9/27/2017 12:35 PM David Olivarez MD Physician Sign       "      Progress Notes by Shonda Zurita LSW at 9/27/2017 11:03 AM     Author:  Shonda Zurita LSW Service:  (none) Author Type:      Filed:  9/27/2017 12:10 PM Date of Service:  9/27/2017 11:03 AM Creation Time:  9/27/2017 11:03 AM    Status:  Addendum :  Shonda Zurita LSW ()         JEFF  I: JEFF called Mirta with [facility] and left message requesting to know if they received  Form from [employer]. JEFF awaits a Call back.[SM1.1]    ADDENDUM  I: JEFF received message from Mirta stating that she has not received  from [employer] yet but will update JEFF when it arrives.[SM1.2]     ROSALEE Su   *[phone][SM1.1]       Revision History        User Key Date/Time User Provider Type Action    > SM1.2 9/27/2017 12:10 PM Shonda Zurita LSW  Addend     SM1.1 9/27/2017 11:03 AM Shonda Zurita LSW  Sign            Progress Notes by Shonda Zurita LSW at 9/26/2017 10:42 AM     Author:  Shonda Zurita LSW Service:  (none) Author Type:      Filed:  9/26/2017  2:41 PM Date of Service:  9/26/2017 10:42 AM Creation Time:  9/26/2017 10:42 AM    Status:  Addendum :  Shonda Zurita LSW ()         JEFF  I: JEFF was updated by Mirta with the [facility] that patient needs to include income and d/c paperwork from . JEFF discussed income with patient who updated JEFF that he does receive income from reverse mortgage $1,000 and social security $1,000. JEFF updated form and sent to VA. JEFF will discuss getting d/c paperwork with nephew.     P: JEFF will continue to follow and assist as needed.[SM1.1]    JEFF printed off request form for  form (discharge paperwork) and called Nephew. JEFF left message requesting he call SW back or stop up at RN station to fill out document. SW will fax request as soon as form is completed. Form is located on patient's chart.[SM1.2]    ADDENDUM  I: JEFF met with  patient's nephew and he filled out request form for . JEFF called [facility] to request patient's d/c paperwork be sent ASA due to medical emergency. JEFF faxed request form to [phone] and request the copy be sent to [facility] with [facility]. JEFF will continue to follow up and assist as needed. JEFF is unsure of how long it will take to get d/c paperwork sent to VA with stat order.[SM1.3]     ROSALEE Su   *[phone][SM1.1]       Revision History        User Key Date/Time User Provider Type Action    > SM1.3 9/26/2017  2:41 PM Shonda Zurita LSW  Addend     SM1.2 9/26/2017 11:10 AM Shonda Zurita LSW  Addend     SM1.1 9/26/2017 10:55 AM Shonda Zurita LSW  Sign            Progress Notes by Vida Larson APRN CNP at 9/26/2017 11:58 AM     Author:  Vida Larson APRN CNP Service:  Palliative Author Type:  Nurse Practitioner    Filed:  9/26/2017 11:59 AM Date of Service:  9/26/2017 11:58 AM Creation Time:  9/26/2017 11:58 AM    Status:  Signed :  Vida Larson APRN CNP (Nurse Practitioner)         Brief Palliative Progress note.    Chart reviewed. Pt now on comfort measures and plans being made for discharge with hospice cares. No symptom concerns noted from nursing notes. Palliative Care will sign off at this time. Please do not hesitate to re-consult our team if symptom needs arise.    Vida AVENDANO CNP  Pager: [phone]  Palliative Medicine  September 26, 2017[KW1.1]       Revision History        User Key Date/Time User Provider Type Action    > KW1.1 9/26/2017 11:59 AM Vida Larson APRN CNP Nurse Practitioner Sign            Progress Notes by David Olivarez MD at 9/26/2017 10:41 AM     Author:  David Olivarez MD Service:  Hospitalist Author Type:  Physician    Filed:  9/26/2017 10:53 AM Date of Service:  9/26/2017 10:41 AM Creation Time:  9/26/2017 10:41 AM    Status:  Addendum :   David Olivarez MD (Physician)         [facility]    Hospitalist Progress Note[SF1.1]    Assessment & Plan[SF1.2]   Summary: Bong Noguera is a 82 year old male with PMH COPD, suspected bladder cancer, BPH who was admitted on 9/15/2017 with acute hypoxic respiratory failure and severe sepsis. FTH elevated troponins and possible acute systolic CHF exacerbation. Transitioned to comfort cares, awaiting [facility] long term care placement.    Goals of care: During this admission patient has refused PEG placement, aggressive measures, and has indicated that he prefers comfort cares measures. He has refused PT/OT evaluation and has not been fully evaluated for TCU. Has failed swallow study with significant aspiration risk. Patient has acknowledged his aspiration risk and has decided to continue eating. Nephew is POA and has been helping patient with this transition process.  - Palliative consulted  - O[SF1.1]2, morphine, atropine SL for comfort[SF1.3]    Acute hypoxemic respiratory failure  LLL PNA  COPD exacerbation  On admission noted hypotension, elevated lactate, elevated procalcitonin with large LLL infiltrate, and increased O2 reqs on HF. Noted WBC increase on 9/22 to 24.3k. Treated with a total 10 day course starting with Ceft/Azithro -> Zosyn, ending 9/24/17. Given IV steroids for possible COPD component. On 9/25 patient appears symptomatically improved however no further evaluations given comfort cares approach. Sputum cx with Candida, likely contaminant, Bcx NGTD.   - Continue Fluticasone and Spiriva    Demand ischemia: Elevated troponins in the setting of sepsis. TTE showed EF 30-35% and inferoapical akinesis which is new. Cardiology consulted and have recommended medication management.  ROBINSON, improved.  AAA 7cm: Further treatment deferred  Bladder mass: Dx in 2016 but reportedly unable to make appt for TURBT. Hematuria was reported at home. Further evaluation stopped due to comfort cares.  -  Cont Tamsulosin    DVT Prophylaxis: Pneumatic Compression Devices  Code Status:[SF1.1] DNR/DNI[SF1.2]  PT/OT: Refused    Disposition: Expected discharge to [facility] long term care hospice when able, patient is medically stable[SF1.1]    David Olivarez[SF1.2], MD  Text Page  (7am to 6pm)[SF1.1]  Interval History[SF1.2]   Patient feeling well today. Denies SOB. No pain currently. Tongue  Is dry.    -Data reviewed today: I reviewed all new labs and imaging results over the last 24 hours. I personally reviewed the chest x-ray image(s) showing and CT showing left PNA. Barium swallow showing aspiration.[SF1.1]    Physical Exam   Temp: 98.5  F (36.9  C)   BP: 106/59   Heart Rate: 79 Resp: 18 SpO2: 96 % O2 Device: Nasal cannula Oxygen Delivery: 2 LPM  Vitals:    09/22/17 0658 09/23/17 0536   Weight: 65 kg (143 lb 4.8 oz) 65.5 kg (144 lb 6.4 oz)[SF1.2]     Vital Signs with Ranges[SF1.1]  Temp:  [97.7  F (36.5  C)-98.5  F (36.9  C)] 98.5  F (36.9  C)  Heart Rate:  [79-93] 79  Resp:  [16-18] 18  BP: (106-116)/(59-62) 106/59  SpO2:  [92 %-96 %] 96 %  I/O last 3 completed shifts:  In: 350 [P.O.:350]  Out: 600 [Urine:600][SF1.2]  O2 requirements: for comfort    Constitutional: Cachectic male in NAD  HEENT: Normocephalic, atraumatic, eyes nonicteric, oral mucosa moist  Cardiovascular: RRR, normal S1/2, no m/r/g  Respiratory: CTAB  Vascular: PIVs noted, no LE edema noted distal pedal pulses  GI: No organomegaly, normoactive bowel sounds, nontender, nondistended  Skin/Integumen: No rashes or scars  Neuro/Psych: Appropriate affect and mood. A&Ox3, moves all extremities[SF1.1]    Medications     - MEDICATION INSTRUCTIONS -         sodium chloride (PF)  3 mL Intracatheter Q8H     influenza Vac Split High-Dose  0.5 mL Intramuscular Prior to discharge     albuterol  3 mL Nebulization Q4H While awake     sodium chloride (PF)  10 mL Intracatheter Once     fluticasone furoate  1 puff Inhalation Daily     tiotropium  18 mcg Inhalation Daily      tamsulosin  0.4 mg Oral Weekly       Data     Recent Labs  Lab 09/22/17  0717 09/21/17  0510 09/20/17  1650 09/20/17  0845   WBC 24.3* 17.0*  --  24.7*   HGB 14.2 14.7  --  13.6   MCV 97 97  --  97    315  --  290    140  --   --    POTASSIUM 3.8 3.9 3.2*  --    CHLORIDE 100 101  --   --    CO2 34* 33*  --   --    BUN 52* 38*  --   --    CR 0.76 0.72  --   --    ANIONGAP 7 6  --   --    LETHA 8.0* 7.8*  --   --    * 148*  --   --    ALBUMIN  --  1.4*  --   --    PROTTOTAL  --  5.4*  --   --    BILITOTAL  --  0.4  --   --    ALKPHOS  --  126  --   --    ALT  --  29  --   --    AST  --  35  --   --[SF1.2]        Imaging:[SF1.1]   No results found for this or any previous visit (from the past 24 hour(s)).[SF1.2]       Revision History        User Key Date/Time User Provider Type Action    > [N/A] 9/26/2017 10:53 AM David Olivarez MD Physician Addend     SF1.3 9/26/2017 10:53 AM David Olivarez MD Physician Sign     SF1.2 9/26/2017 10:52 AM David Olivarez MD Physician      SF1.1 9/26/2017 10:41 AM David Olivarez MD Physician             Progress Notes by Valeria Rios PA-C at 9/26/2017  8:52 AM     Author:  Valeria Rios PA-C Service:  Urology Author Type:  Physician Assistant - BINA    Filed:  9/26/2017  8:54 AM Date of Service:  9/26/2017  8:52 AM Creation Time:  9/26/2017  8:52 AM    Status:  Signed :  Valeria Rios PA-C (Physician Assistant - C)         Urology Brief Note:  Dx: Bladder cancer, was previously a no-show for TURBT. Pt was re-scheduled for TURBT 10/3/17 with Dr. Trujillo    Assessment: Pt now going to hospice cares    Plan: Pt is now comfort cares  -We will cancel the TURBT scheduled for next week and sign off      Please contact me with any questions or concerns.     Valeria Rios PA-C  [facility]  Pager:  [phone]  After 4pm and on weekends, please call [phone][MS1.1]           Revision History         User Key Date/Time User Provider Type Action    > MS1.1 9/26/2017  8:54 AM Valeria Rios PA-C Physician Assistant - C Sign                  Procedure Notes     No notes of this type exist for this encounter.         Progress Notes - Therapies (Notes from 09/26/17 through 09/29/17)      Progress Notes by AMY JARA at 9/29/2017  6:08 PM     Author:  AMY JARA Service:  Respiratory Therapy Author Type:  Respiratory Therapist    Filed:  9/29/2017  6:09 PM Date of Service:  9/29/2017  6:08 PM Creation Time:  9/29/2017  6:08 PM    Status:  Signed :  AMY JARA, RT (Respiratory Therapist)         Patient is on 3L bubbler with SpO2 in the low 90's. BS diminished and coarse at times. All nebs were given as ordered.  Will cont to follow.[SB1.1]  9/29/2017[SB1.2]  Amy Jara RRT[SB1.1]     Revision History        User Key Date/Time User Provider Type Action    > SB1.2 9/29/2017  6:09 PM AMY JARA Respiratory Therapist Sign     SB1.1 9/29/2017  6:08 PM AMY JARA Respiratory Therapist             Progress Notes by Saul King RT at 9/29/2017  3:25 AM     Author:  Saul King RT Service:  Respiratory Therapy Author Type:  Respiratory Therapist    Filed:  9/29/2017  3:25 AM Date of Service:  9/29/2017  3:25 AM Creation Time:  9/29/2017  3:25 AM    Status:  Signed :  Saul King RT (Respiratory Therapist)         Pt stable on 2 lpm nc. BBS diminished/coarse. Will continue to follow.     Saul King RT[TK1.1]      Revision History        User Key Date/Time User Provider Type Action    > TK1.1 9/29/2017  3:25 AM Saul King RT Respiratory Therapist Sign            Progress Notes by AMY JARA at 9/28/2017  6:21 PM     Author:  AMY JARA Service:  Respiratory Therapy Author Type:  Respiratory Therapist    Filed:  9/28/2017  6:23 PM Date of Service:  9/28/2017  6:21 PM Creation Time:  9/28/2017  6:21 PM    Status:  Signed :  LAILA  RT AMY (Respiratory Therapist)         Patient is on a 2L NC with SpO2 in the low 90's. BS diminished. All nebs were given as ordered.  Will cont to follow.[SB1.1]  9/28/2017[SB1.2]  Amy Jara RRT[SB1.1]     Revision History        User Key Date/Time User Provider Type Action    > SB1.2 9/28/2017  6:23 PM AMY JARA Respiratory Therapist Sign     SB1.1 9/28/2017  6:21 PM AMY JARA Respiratory Therapist             Progress Notes by Swapna Anedrson RT at 9/27/2017  7:18 PM     Author:  Swapna Anderson RT Service:  Respiratory Therapy Author Type:  Respiratory Therapist    Filed:  9/27/2017  7:21 PM Date of Service:  9/27/2017  7:18 PM Creation Time:  9/27/2017  7:18 PM    Status:  Signed :  Swapna Anderson RT (Respiratory Therapist)         Patient is on 2 LPM NC and SpO2 low 90`s. Scheduled neb tx given and tolerated well. BBS diminished pre and post. Pt has strong productive cough. Cough up moderate amount of yellow and thick secretions. Will continue to follow.[SK1.1]     Revision History        User Key Date/Time User Provider Type Action    > SK1.1 9/27/2017  7:21 PM Swapna Anderson RT Respiratory Therapist Sign

## 2017-09-16 PROBLEM — J18.9 PNEUMONIA: Status: ACTIVE | Noted: 2017-01-01

## 2017-09-16 NOTE — ED NOTES
"[facility]  ED Nurse Handoff Report    ED Chief complaint: No chief complaint on file.      ED Diagnosis:   Final diagnoses:   Pneumonia due to infectious organism, unspecified laterality, unspecified part of lung   Acute respiratory failure with hypoxia (H)   Severe sepsis (H)   History of COPD   Acute kidney injury (H)       Code Status: Full Code    Allergies:   Allergies   Allergen Reactions     Ciprofloxacin      Clindamycin Hcl      Hydrocodone      Was no help in the past     Tylenol With Codeine [Acetaminophen-Codeine]      He things he passed out with this       Activity level - Baseline/Home:  Independent    Activity Level - Current:   Stand with Assist     Needed?: No    Isolation: No  Infection: Not Applicable    Bariatric?: No    Vital Signs:   Vitals:    09/15/17 2150 09/15/17 2153 09/15/17 2243 09/15/17 2307   BP: (!) 86/61 97/67 117/64    Resp: 24  24    Temp: 97.4  F (36.3  C)      TempSrc: Oral      SpO2: 93% 92% 96% 97%       Cardiac Rhythm: ,        Pain level:      Is this patient confused?: Pt is able to answer all questions appropriately, however EMS states that he has notes around his house with reminders.  Ex. In his study he has a note that says take deeps breaths and another note that says sit up before swallowing.    Patient Report: Initial Complaint: EMS called for generalized weakness.  PD was also called as EMS stated he \"gets easily agitated\".  Pt found in his study unable to ambulate or stand. Pt states he has been getting increasingly weak throughout the last 2 weeks.  Also states that he has had a cough for x 5 years.  Focused Assessment: A & O but forgetful at times.  Hypotensive & tachycardic upon arrival.  LS coarse throughout, crackles and wheezes to KELECHI & LLL.  BLE are alec.  Denies pain.  Tests Performed: EKG, labs, xray, straight cath  Abnormal Results: xray, labs  Treatments provided: Abx, bolus x 2    Family Comments: NA    OBS brochure/video " discussed/provided to patient: N/A    ED Medications:   Medications   cefTRIAXone (ROCEPHIN) 2 g vial to attach to  ml bag for ADULTS or NS 50 ml bag for PEDS (2 g Intravenous New Bag 9/15/17 231)   azithromycin (ZITHROMAX) 500 mg in NaCl 0.9 % 250 mL intermittent infusion (not administered)   ipratropium - albuterol 0.5 mg/2.5 mg/3 mL (DUONEB) neb solution 3 mL (3 mLs Nebulization Given 9/15/17 2304)   0.9% sodium chloride BOLUS (0 mLs Intravenous Stopped 9/15/17 2253)   0.9% sodium chloride BOLUS (1,000 mLs Intravenous New Bag 9/15/17 2250)       Drips infusing?:  Yes      ED NURSE PHONE NUMBER: [phone]

## 2017-09-16 NOTE — H&P
"PRIMARY CARE PHYSICIAN:  Chaparro Carroll MD      CHIEF COMPLAINT:  Generalized weakness.      HISTORY OF PRESENT ILLNESS:  Bong Noguera is an 82-year-old gentleman who lives alone, and called 911 today due to progressive generalized weakness which has been coming on gradually, in his words, for \"years.\"  Unfortunately, he is not very forthcoming in information, is resistant to answering all of the questions, repeatedly stating, \"It's on my list.\"  He has a list of clinic and doctor visits that I reviewed as well as discussed with the patient.  Basically, he has been using a walker at home, able to get around, but it has been more and more difficult.  He has had significant weight loss in the last several years, but it has stabilized more recently.  Weight was 155 in 2011, 149 in 2014, and 136 in 2016, and has remained in the 130s.  He has chronic shortness of breath with exertion, and he states this has not changed.  He also has a chronic cough that is productive of yellow sputum which he states has not changed.  He has chronic pain in his chest as well with coughing and deep breathing.  Again, he states there has been no recent change in this.  He denies any acute changes.      However, in the ED, multiple abnormalities were found which appear acute in nature.  He was afebrile.  His heart rate was slightly elevated at 108.  Initial blood pressure was 86/61, respirations 24, and oxygenation was 93% on 3 liters.  His labs are significant for an elevated creatinine of 1.97, which is up from 0.71 on 07/20.  His sodium and potassium were normal at 139 and 3.9, and BUN was up at 85.  His white blood cell count was 15.7.  Urinalysis was not that exciting, with 13 white blood cells and 7 RBC, and he denies any urinary symptoms at this time.  His lactic acid was 2.2.  Procalcitonin was 5.22.  VBG reveals pH of 7.43, CO2 of 34, O2 of 64.      His chest x-ray reveals an extensive opacity of the left lower lobe, but otherwise, " lungs are clear without any evidence of pleural effusion or edema.      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  It appears he was supposed to follow up for that, but has not yet done so.  He has an appointment with Dr. Trujillo in the next couple of months.      The rest of his 10-point review of systems was unremarkable, and again he denies any acute symptoms bringing him in, just generalized weakness.  It should also be noted that when the EMTs went to go to his house, he had multiple sticky notes around the house with things that stated future appointments, and also a reminder to breathe deeply, but when the patient is asked questions of orientation, he is able to answer everything but the day of the week, stating that he no longer keeps track of this.  Otherwise he appears oriented.      PAST MEDICAL HISTORY:   1.  Chronic obstructive pulmonary disease.   2.  Cachexia, as outlined above.   3.  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   4.  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  It looks like this was discovered in 07/2016, and he has yet to follow up and have this removed.   5.  I suspect he has benign prostatic hypertrophy, as he is on Flomax.      PAST SURGICAL HISTORY:  Status post bilateral total knee arthroplasty.      SOCIAL HISTORY:  He lives alone.  He quit smoking in 2007.  He now chews nicotine gum.  He drinks about one can of beer per week, no more.      FAMILY HISTORY:  The patient was resistant to all of my questioning, and nothing is on file.      ALLERGIES:  Cipro and clindamycin caused unknown reactions.  There is a reported intolerance to hydrocodone, but it appears that this just did not help.  Tylenol with codeine, he thinks, made him pass out.      MEDICATIONS:   Prior to Admission Medications   Prescriptions Last Dose Informant Patient Reported? Taking?    Nasal Dilators (BREATHE RIGHT ADVANCED) STRP   Yes No   Sig: daily   Oxymetazoline HCl (AFRIN 12 HOUR NA)   Yes Yes   Sig: Spray in nostril twice a week    PULMICORT FLEXHALER 180 MCG/ACT inhaler   No Yes   Sig: TAKE 2 PUFF BY MOUTH TWICE DAILY   Pseudoephedrine HCl (SUDAFED PO)   Yes Yes   Sig: Take by mouth every morning Dose unknown   SPIRIVA HANDIHALER 18 MCG inhalation capsule   No Yes   Sig: INHALE 1 CAPSULE BY MOUTH INTO THE LUNGS DAILY   fluticasone (FLONASE) 50 MCG/ACT spray prn  Yes Yes   Sig: Spray 1 spray into both nostrils daily as needed for rhinitis    tamsulosin (FLOMAX) 0.4 MG capsule Past Month at Unknown time  Yes Yes   Sig: Take 0.4 mg by mouth once a week      Facility-Administered Medications: None          REVIEW OF SYSTEMS:  A complete 10-point review of systems was performed and is unremarkable except for what I have mentioned above.      PHYSICAL EXAM:   VITAL SIGNS:  His blood pressure is currently 117/64, temperature 97.4, respiratory rate 24, oxygenation 97% on 3 liters.   GENERAL:  He appears cachectic but in no acute distress.   NEUROPSYCH:  His is alert.  He is oriented x3.  Although he is resistant to giving his history, he gives appropriate answers.  His face is symmetric.  Cranial nerves II-XII are grossly intact.  He is moving all four extremities without gross focal neurological deficit.   HEENT:  Sclerae nonicteric, noninjected.  His oropharynx is dry without erythema or swelling.   NECK:  Supple without lymphadenopathy or thyromegaly.   HEART:  Regular without murmur, gallop or rub.   LUNGS:  Decreased breath sounds bilaterally, more pronounced on the left than on the right.  There are no wheezes or rhonchi or crackles.   ABDOMEN:  Soft.  I do palpate an induration in the mid abdomen, suggesting a possible mass.  Otherwise, no other masses or hepatosplenomegaly are noted.  Bowel sounds are of normal pitch and frequency. Right inguinal hernia.   EXTREMITIES:  Warm without  edema.   SKIN:  No acute rashes, ecchymoses or petechiae.   JOINTS:  No swelling or warmth.      LABS AND OTHER STUDIES:  As above in HPI.      ASSESSMENT:  Bong Noguera is an 82-year-old man with history of COPD, and a bladder tumor which appeared to be papillary bladder cancer noted in 07/2016 by Dr. Trujillo.  He has had no follow-up procedure for this.  He presents today just due to generalized weakness, and was found to be hypoxic, initially in the mid-80s on room air. Chest x-ray reveals diffuse extensive airspace opacity throughout the left lower lung.   His white cell count is elevated at 15.7.  Creatinine is elevated at 1.97, with last creatinine being 0.71 in July.  His lactic acid is mildly elevated at 2.2.  He is afebrile, but his heart rate was up a little bit at 186, and blood pressure was 86/61, and this came up with fluid.       PLAN:   1.  Acute hypoxic respiratory failure with sepsis secondary to left lower lobe pneumonia:  In the ED, he received a 1 liter bolus of IV fluids, and was started on ceftriaxone and azithromycin.  I am going to give him another 500 cc of fluids, and will continue him on normal saline at 100 cc per hour, and continue both ceftriaxone and azithromycin.  Sputum culture has been ordered as well as Strep pneumo and urine antigen.  Will have guaifenesin for cough.  Will continue his prior to admission Spiriva and Pulmicort inhaler.  He does not sound to have acute obstruction or COPD exacerbation on exam.   2.  Acute renal failure secondary to dehydration and sepsis:  As above, he received 1 liter of fluid.  I am going to give him another 500 cc.  We will follow up his basic metabolic panel in the morning.   3.  Bladder tumor, suspicious for papillary bladder cancer, noted a year ago by Dr. Trujillo.  He has not yet had follow-up for this, and it is worrisome, as I palpate a mass  in his mid-abdomen, and he has had progressive failure to thrive and weakness.  I am going to consult  Urology for their input of the likelihood of him suffering metastatic disease.  I would consider getting a CT of the chest, abdomen and pelvis prior to discharge, depending on their take on this.  Otherwise, this can be done as an outpatient.   4.  Benign prostatic hypertrophy:  We will continue his prior to admission Flomax.   5.  Chronic rhinitis:  We will continue his Sudafed.  I have not ordered his Afrin which he takes twice weekly.   6.  DVT prophylaxis:  He will be on Lovenox subq daily.   7.  CODE STATUS:  Discussed with the patient, and he is clear that he does not want resuscitation attempts, and would like to be DNR/DNI.   8.  Disposition:  He will be admitted as an inpatient.  I anticipate he will be here for at least two nights.         ELIZABETH GAVIRIA MD             D: 2017 01:38   T: 2017 03:31   MT: [id number]      Name:     KAYLA MADRIGAL   MRN:      [id number]        Account:      [id number]   :      1935           Admitted:     [id number]      Document: [id number]       cc: Chaparro Trujillo MD

## 2017-09-16 NOTE — INTERIM SUMMARY
Michelle pino to the H&P note form earlier this morning  for the details of this presentation, in brief.   Bong Noguera is a 82 year old cachectic gentleman coming with Pneumonia and failure to thrive;    - -  PNA; continue with Abx  - -  ROBINSON improving with IV hydration  - - weight Loss and suspected  Calorie malnutriosn; nutrition consult consult  - - failure to thrive : lives alone, Pt/OT yina

## 2017-09-16 NOTE — PHARMACY-ADMISSION MEDICATION HISTORY
Admission medication history interview status for the 9/15/2017  admission is complete. See EPIC admission navigator for prior to admission medications     Medication history source reliability:Poor. Pt had a printed list of only med names.  Pt was annoyed, swearing, and unwilling to clearly answer my questions.     Actions taken by pharmacist (provider contacted, etc):Called [facility] for recent fills of Rx meds     Additional medication history information not noted on PTA med list : patient is probably medication non-compliant.    Medication reconciliation/reorder completed by provider prior to medication history? No    Time spent in this activity: 20 minutes    Prior to Admission medications    Medication Sig Last Dose Taking? Auth Provider   tamsulosin (FLOMAX) 0.4 MG capsule Take 0.4 mg by mouth once a week Past Month at Unknown time Yes Unknown, Entered By History   fluticasone (FLONASE) 50 MCG/ACT spray Spray 1 spray into both nostrils daily as needed for rhinitis  prn Yes Unknown, Entered By History   Pseudoephedrine HCl (SUDAFED PO) Take by mouth every morning Dose unknown  Yes Unknown, Entered By History   Oxymetazoline HCl (AFRIN 12 HOUR NA) Spray in nostril twice a week   Yes Reported, Patient   PULMICORT FLEXHALER 180 MCG/ACT inhaler TAKE 2 PUFF BY MOUTH TWICE DAILY  Yes Klaus Christie MD   SPIRIVA HANDIHALER 18 MCG inhalation capsule INHALE 1 CAPSULE BY MOUTH INTO THE LUNGS DAILY  Yes Klaus Christie MD

## 2017-09-16 NOTE — PLAN OF CARE
Problem: Goal Outcome Summary  Goal: Goal Outcome Summary  Outcome: No Change  Pt is A&O x 4, calm and cooperative, slightly [place]. VSS on 2LNC. Desaturates 86-88% on RA. Up x 1 assist. Lung sound fine crackles on upper lobes and expiratory wheezes on lower lobes with infrequent productive cough, thin sputum at times. Sputum and urine collected and sent to lab. Result pending. Blanchable reddened area on inner buttocks. IVF infusing at 100mL/hr. Discharge pending in progress. Will continue to monitor.

## 2017-09-16 NOTE — PLAN OF CARE
Problem: Goal Outcome Summary  Goal: Goal Outcome Summary  Discharge Planner SLP   Patient plan for discharge: none stated  Current status: Bedside swallow eval completed today. Pt presents with moderate oropharyngeal dysphagia in the setting of generalized weakness. Recommend downgrade to dysphagia diet 2 and nectar thick liquids. Pt should be fully upright and alert for all PO, take small single sips/bites, pace self, double swallow each bite, and alternate between consistencies.  Barriers to return to prior living situation: dysphagia   Recommendations for discharge: ongoing ST for dysphagia   Rationale for recommendations: pt is not yet at baseline diet level        Entered by: Brigette Benites 09/16/2017 12:36 PM

## 2017-09-16 NOTE — CONSULTS
"CLINICAL NUTRITION SERVICES  -  ASSESSMENT NOTE      RECOMMENDATIONS FOR MD/PROVIDER TO ORDER:     RECOMMEND SLP DEV (pt notes difficulty swallowing solids and liquids)       Recommendations Ordered by Registered Dietitian (RD):     Will make pt Room Service with Assist (he will be seen daily for meal orders)    Will send a strawberry PLUS2 milkshake with every meal (each provides 625 omero and 19 gm pro)    Ordered current wt       Malnutrition:   % Weight Loss:  Weight loss does not meet criteria for malnutrition (3% in the past month and up from 1 yr ago)  % Intake:  No decreased intake noted (pt states he is eating per usual - but suspect inadequate)  Subcutaneous Fat Loss:  Upper arm region  - severe depletion  Muscle Loss:  Temporal region  - severe depletion, Clavicle bone region  - severe depletion and Acromion bone region  - severe depletion  Fluid Retention:  None noted    Malnutrition Diagnosis: Severe malnutrition  In Context of:  Chronic illness or disease          REASON FOR ASSESSMENT  Bong Noguera is a 82 year old male seen by Registered Dietitian for Provider Order - \"weight loss\"      NUTRITION HISTORY  Visited with pt this morning   Seemed a bit crabby and not very forthcoming with information - not wanting to answer my questions  Notes that he has had difficulty swallowing \"for years\"  Says that he even has some trouble swallowing water  States that he tries to eat 4 times per day  Makes himself strawberry malts  Likes to have a blueberry muffin for breakfast  Does not like eggs  Drinks malts instead of milk  Does not note any decrease in his typical intake      CURRENT NUTRITION ORDERS  Diet Order:     Regular     Pt questioning if he will be able to get any breakfast down with his difficulty swallowing  Ordered him a blueberry muffin, strawberry PLUS2 milkshake and orange wedges (per his request)      PHYSICAL FINDINGS  Observed  Muscle Wasting  - temples, cheeks, clavicle, " "shoulder  Subcutaneous fat loss  - upper arms  Obtained from Chart/Interdisciplinary Team  Cachectic    ANTHROPOMETRICS  Height: 6'5\"  Weight: No wt taken since admit (put in order for current wt)  IBW: 94.5 kg  Weight History: Pt states he has a scale at home and has weighed himself in the past few days - \"want to say it was 137#\"  (62.3 kg).  Pt has had gradual wt loss over the past few years - but seems to have leveled off ~140# (was in the 130s last year).  Note pt dehydrated on admit.  Wt Readings from Last 10 Encounters:   08/10/17 64 kg (141 lb)   07/20/17 63.5 kg (140 lb)   05/30/17 65.4 kg (144 lb 3.2 oz)   08/16/16 61.2 kg (135 lb)   07/07/16 59.9 kg (132 lb)   06/30/16 61.5 kg (135 lb 9.6 oz)   02/04/16 61.7 kg (136 lb)   10/09/14 67.8 kg (149 lb 6.4 oz)   06/05/14 69.5 kg (153 lb 3.2 oz)   04/24/14 70.9 kg (156 lb 6.4 oz)         LABS  Labs reviewed    MEDICATIONS  IVF (NaCl) at 100 mL/hr    Dosing Weight 62.3 kg (pt's reported wt)    ASSESSED NUTRITION NEEDS PER APPROVED PRACTICE GUIDELINES:  Estimated Energy Needs: 4430-4184 kcals (35-40 Kcal/Kg)  Justification: underweight  Estimated Protein Needs: 75-95 grams protein (1.2-1.5 g pro/Kg)  Justification: Repletion    MALNUTRITION:  % Weight Loss:  Weight loss does not meet criteria for malnutrition (3% in the past month and up from 1 yr ago)  % Intake:  No decreased intake noted (pt states he is eating per usual - but suspect inadequate)  Subcutaneous Fat Loss:  Upper arm region  - severe depletion  Muscle Loss:  Temporal region  - severe depletion, Clavicle bone region  - severe depletion and Acromion bone region  - severe depletion  Fluid Retention:  None noted    Malnutrition Diagnosis: Severe malnutrition  In Context of:  Chronic illness or disease    NUTRITION DIAGNOSIS:  Predicted inadequate nutrient intake related to swallowing difficulty as evidenced by pt with muscle and fat wasting      NUTRITION INTERVENTIONS  Recommendations / Nutrition " Prescription  Regular diet  Nutrition supplements    RECOMMEND SLP EVAL (pt notes difficulty swallowing solids and liquids)  .      Implementation  Nutrition education ---> Reviewed current diet order and supplements  Will make pt Room Service with Assist (he will be seen daily for meal orders)  Will send a strawberry PLUS2 milkshake with every meal (each provides 625 omero and 19 gm pro)  Ordered a current wt  .      Nutrition Goals  Pt to consume 50% meals and 100% of the shakes being sent with meals  .      MONITORING AND EVALUATION:  Progress towards goals will be monitored and evaluated per protocol and Practice Guidelines

## 2017-09-16 NOTE — ED NOTES
Bed: ED28  Expected date: 9/15/17  Expected time: 9:40 PM  Means of arrival: Ambulance  Comments:  [facility] [place] 82[place]

## 2017-09-16 NOTE — PLAN OF CARE
Problem: Goal Outcome Summary  Goal: Goal Outcome Summary  Outcome: No Change  A&O x 4, up SBA to the BR, BP low b/n 89/ 57- 98/53, given IV bolus, LA 1.6. c/o of chest pain with coughing, relief with tylenol and cough syrup, seen by speech , on DD2 with nectar thick liquids, appetite good, IVF at 100cc/ hr, coughing up creamy sputum, LS wheezing and diminished bases, given nicorette gum few times, uses urinals, refused PT, OT.d/c pending progress.

## 2017-09-16 NOTE — ED PROVIDER NOTES
"  History     Chief Complaint:  Weakness     HPI   History is limited, available HPI complemented by chart review    Bong Noguera is a 82 year old male with history of COPD and presumed bladder cancer, who presents with concern for generalized weakness. He called EMS because he \"could not walk\" due to overall weakness.  No pain, incontinence, dysuria.   He reports cough of unclear duration.  He states he had some liquids today to eat. He normally ambulates with a walker, denies recent falls. He states he has bladder cancer and is scheduled to undergo surgery next month. He lives alone in his private residence.     Allergies:  Ciprofloxacin  Clindamycin  Tylenol-codeine     Medications:    Flonase  Afrin  Tamsulosin  Wal-phed  Pulmicort flexhaler  Spiriva      Past Medical History:    Bilateral intraorbital pathway through paranasal sinuses  COPD  Cachexia   Right inguinal hernia     Past Surgical History:    History is non-contributory.     Family History:    History is non-contributory.     Social History:  Marital Status:   [4]  Smoking status: current, 0.10 PPD   Alcohol status: 1 can of beer a week  Patient presents via EMS, alone.  PCP: [facility]      Review of Systems   Unable to perform ROS: Other   poor historian    Physical Exam     Patient Vitals for the past 24 hrs:   BP Temp Pulse Heart Rate Resp SpO2   09/16/17 0106 97/70 97.5  F (36.4  C) 86 86 18 93 %   09/16/17 0043 101/73 - 92 - 18 98 %   09/16/17 0030 101/73 - - 87 22 95 %   09/16/17 0011 98/60 - - 89 17 95 %   09/15/17 2307 - - - - - 97 %   09/15/17 2243 117/64 - - 93 24 96 %   09/15/17 2153 97/67 - - 111 - 92 %   09/15/17 2150 (!) 86/61 97.4  F (36.3  C) - 108 24 93 %      Physical Exam  General: chronically ill appearing male, somewhat cachectic  HENT: mucous membranes very dry, OP clear  CV: regular rate, regular rhythm, no lower extremity edema  Resp: very coarse at left base, no wheezing, no stridor, speaks in full " phrases, occasional cough observed   GI: abdomen soft and nontender, no guarding  External genitalia nontender  MSK: no bony tenderness, no CVAT  Skin: appropriately warm and dry  Neuro: alert, clear speech, oriented though poor historian, moves all extremities with symmetric strength though diffusely weak, no meningismus  Psych: expresses frustration at his condition, cooperative      Emergency Department Course     ECG (22:04:57):  Indication: generalized weakness.   Rate 102 bpm. UT interval 156 ms. QRS duration 90  ms. QT/QTc 352/458 ms. R axis -41.   Interpretation: sinus tachycardia with occasional PVC. Possible FRITZ. LAD. Abnormal ECG.  Agree with computer interpretation.   Interpreted at 2224 by Malick Mendoza MD.     Imaging:  Radiology findings were communicated with the patient who voiced understanding of the findings.    Chest XR, PA and LAT, per radiology:       There is extensive airspace opacity throughout the left lower lobe. The lungs are otherwise clear. There is no pleural effusion or pneumothorax. Heart size is normal with no evidence for congestive failure.    Laboratory:  Laboratory findings were communicated with the patient who voiced understanding of the findings.    CBC: WBC 15.7, HGB 14.9,     ISTAT lactate: pH 7.43, HCO3 23, Lactic acid 2.2  Blood cultures x2: Pending     CMP: Glucose 154, BUN 85, Creatinine 1.97, GFR 33     UA: slightly cloudy, yellow, Albumin 30, Blood moderate,  WBC 13, RBC 7, Hyaline casts 4  Urine culture: pending     Interventions:  2203: NS 1,000 mL, IV  2254: NS 1,000 mL, IV   2307: Duoneb 3ml solution, nebulized   2310: Ceftriaxone 2 g, IV   2346: Azithromycin 500 mg, IV      Emergency Department Course:  Nursing notes and vitals reviewed.  I performed an exam of the patient as documented above.   The patient was placed on continuous pulse oximetry and cardiac monitoring.     A peripheral IV was established and blood was drawn for laboratory  testing, results above.  The patient provided a urine sample here in the emergency department. This was sent for laboratory testing, findings above.   CXR obtained while in the emergency department, findings above.      233, patient was rechecked.   , I discussed the patient with Dr. Koehler, hospitalist service.     I rechecked the patient and the findings were explained to him, who consents to admission. I discussed the patient with Dr. Koehler, who will admit the patient for further monitoring, evaluation and treatment.      Impression & Plan      Medical Decision Makin year old presents with generalized weakness. I think this is multi-factorial, including dehydration, as evidenced by his dry mucous membranes and presenting hypotension that improved with IVF in the setting of poor oral intake. He also has abnormal left-sided breath sounds in the setting of a cough and CXR findings highly suspicious for pneumonia. He meets criteria for severe sepsis given his initial hypotension and elevated lactate. Given his elevated lactate, I ordered a repeat laboratory collect lactate at 0245, within the 6 hour bundle window.  IV antibiotics were initiated.  Metastatic disease also possible, and he will ultimately benefit from Urology evaluation at some point.  He requires hospitalization for further care and has been accepted by the hospitalist service.      Diagnosis:    ICD-10-CM   1. Pneumonia due to infectious organism, unspecified laterality, unspecified part of lung J18.9   2. Acute respiratory failure with hypoxia (H) J96.01   3. Severe sepsis (H) A41.9    R65.20   4. History of COPD Z87.09   5. Acute kidney injury (H) N17.9   6. Abnormal urinalysis R82.90       Disposition:   Adult Med/Surg    CMS Diagnoses:   The patient has signs of Severe Sepsis as evidenced by:    1. 2 SIRS criteria, AND  2. Suspected infection, AND   3. Organ dysfunction: Lactic Acid >2, SBP <90, MAP < 65, or SBP decrease of >40 from  baseline due to infection    Time severe sepsis diagnosis confirmed = 2211 as this was the time when Lactate resulted, and the level was >2     3 Hour Severe Sepsis Bundle Completion:  1. Initial Lactic Acid Result:   Recent Labs   Lab Test  09/15/17   2205   LACT  2.2*     2. Blood Cultures before Antibiotics: Yes  3. Broad Spectrum Antibiotics Administered: Yes     Anti-infectives (Future)    Start     Dose/Rate Route Frequency Ordered Stop    09/17/17 0900  azithromycin (ZITHROMAX) tablet 250 mg      250 mg Oral EVERY 24 HOURS 09/16/17 0123 09/21/17 0859    09/17/17 0000  cefTRIAXone (ROCEPHIN) 2 g vial to attach to  ml bag for ADULTS or NS 50 ml bag for PEDS      2 g  over 30 Minutes Intravenous EVERY 24 HOURS 09/16/17 0123          4. 2000 ml of IV fluids.  (>30cc/kg)  the patient was transferred out of the ED prior to the 6 hour bartloo.     Scribe Disclosure:  Layne DE LA TORRE, am serving as a scribe at 10:11 PM on 9/15/2017 to document services personally performed by Malick Mendoza MD, based on my observations and the provider's statements to me.   EMERGENCY DEPARTMENT       Malick Mendoza MD  09/16/17 1697

## 2017-09-16 NOTE — PROGRESS NOTES
MD Notification    Notified Person:  MD    Notified Persons Name:Dr. Salvador    Notification Date/Time:9/16/17    Notification Interaction:  Talked with Physician    Purpose of Notification: BP 90/ 56.  after iv bolus, patient was a symptomatic    Orders Received:no new orders    Comments:keep monitoring for now

## 2017-09-16 NOTE — PROGRESS NOTES
MD Notification    Notified Person:  MD    Notified Persons Name:Dr. Salvador.    Notification Date/Time:9/16/17 at 9am    Notification Interaction:  Talked with Physician    Purpose of Notification:BP 89/57, HR 93    Orders Received:yev IV bolus    Comments:

## 2017-09-16 NOTE — PLAN OF CARE
"Problem: Goal Outcome Summary  Goal: Goal Outcome Summary  OT: Attempted session however, pt adamantly declined. Provided encouragement and education regarding need for participation in order to return PLOF and determine safe discharge plan, but he continued to refuse and stated \"I will probably be dead tomorrow anyway.\" Pt declined therapist returning this afternoon as well. Will reschedule for 9/17.       "

## 2017-09-16 NOTE — PLAN OF CARE
"Problem: Goal Outcome Summary  Goal: Goal Outcome Summary  PT: Orders received, chart reviewed, eval attempted. Subjective portion of eval completed, pt educated on PT role and POC as well as goals during acute stay, requires reinforcement and motivation. Pt declining any mobility at this time 2/2 weakness and fatigue despite education, pt agreeable to LE exercises however breakfast arrived and pt requesting to eat. Also discussed with RN placing a SLP consult as pt reports difficulty swallowing, \"my throat doesn't work anymore\". Pt placed in fully upright position in bed before placing food near, RN aware breakfast present. Will attempt back as able to evaluate mobility and make discharge recommendation.      "

## 2017-09-17 NOTE — PROGRESS NOTES
X cover    Called about decrease in oxygenation with rales, patient was given IVF.    Also now hypertensive.  IVF discontinued.    Will order 40 mg Iv lasix.      King Mancia MD

## 2017-09-17 NOTE — PROGRESS NOTES
RRT: hypotension, worsening respiratory failure     Mr. Noguera is an 82 year old gentleman with severe COPD as noted on his PFTs in 2014. Per chart possible bladder tumor. He was admitted with left side pneumonia with extensive involvement. He received fluids and abx. He developed worsening sob and was given lasix. His blood pressure dropped after lasix. He notes left sided chest pain overlying the same area of the pneumonia on the cxr.     Blood pressure 104/70, pulse 121, temperature 98  F (36.7  C), temperature source Oral, resp. rate 24, SpO2 94 %.    Gen mild distress, increased work of breathing   HEENT: no JVD, dry mm  Chest diminished air entry both lung with left greater than right   Heart Tachy but regular  ABD soft benign non tender   EXT 0 edema   Neuro non focal     Course: CXR worsening pneumonia, EKG sinus tachycardia, BiPAP trial (worked well but he did not tolerate it), lab work sent     A/P  Worsening respiratory distress: improved with BiPAP but discontinued due to intolerance. Will give duoneb and will reassess. Chest pain is better.   Hypotension: gave small bolus with interval improvement in BP   Labs are currently pending. Patient felt better at the end of the encounter.   Will sign off.    Troponin was elevated, hospitalist notified and plans to repeat every 6 hrs.

## 2017-09-17 NOTE — PROVIDER NOTIFICATION
Dr Mancia pagelauren re: decreased Sats, LS-rales t/o, -122, pt c/o anxiety/dyspnea, order for Lasix IV received.

## 2017-09-17 NOTE — PLAN OF CARE
Problem: Goal Outcome Summary  Goal: Goal Outcome Summary  SLP-  Per nursing pt not stable for therapies today.  Will reschedule for tomorrow.

## 2017-09-17 NOTE — PLAN OF CARE
Problem: Goal Outcome Summary  Goal: Goal Outcome Summary  Outcome: No Change  Pt A/O#3, forgetful, RRT called at 0300 d/t hypoxia/dyspnea/tachycardia/elevated BP, c/o anxiety/increased Lt rib cage pain. CXR/EKG done, tele-ST w/PACs, pt put on BPAP, Nebs given, pt appears to be more comfortable/resting, /70, , RR 20, sats 94%, incontinent of urine, continues w/prod cough, IV Rocephin, will monitor.  Trop-0.470, Dr Mancia notified, will do serial trops q6h.

## 2017-09-17 NOTE — CONSULTS
"[facility]    Cardiology Consultation     Date of Admission:  9/15/2017    Assessment & Plan   Bong Noguera is a 82 year old male who was admitted on 9/15/2017.    A/P  1. Elevated troponin in setting of left lower lobe pneumonia  Patient does not have any chest pain. His troponins are plateaued and not consistent with acute coronary syndrome. They're likely due to type II leak. At this time, I would recommend an echocardiogram to assess wall motion and ejection fraction. Peak troponin is 0.97. If ejection fraction is stable, we should consider an pharmacological stress imaging procedure like stress MRI. We'll start him on empiric statin and check lipid profile given that he has abdominal aortic aneurysm. Start baby aspirin.    2. Left lower lobe pneumonia  On antibiotics. Management per hospitalist.    3. Bladder cancer  By urology    4. Large abdominal aortic aneurysm found incidentally  Vascular surgery following.          Tesfaye Dean MD    Primary Care Physician   [facility]    Reason for Consult   Reason for consult: I was asked by hospitalist  to evaluate this patient forelevated troponin.    History of Present Illness      Bong Noguera is an 82-year-old gentleman who lives alone, and called 911on day of admissione to progressive generalized weakness which has been coming on gradually, in his words, for \"years.\"He is a poor historian.   Unfortunately, he is not very forthcoming in information.  Basically, he has been using a walker at home, able to get around, but it has been more and more difficult.  He has had significant weight loss in the last several years, but it has stabilized more recently.  He has chronic shortness of breath with exertion, and he states this has not changed.  He also has a chronic cough that is productive of yellow sputum which he states has not changed.  He has chronic pain in his chest as well with coughing and deep breathing.  Again, he " states there has been no recent change in this.        However, in the ED, multiple abnormalities were foundbut  was afebrile.  His heart rate was slightly elevated at 108.  Initial blood pressure was 86/61, respirations 24, and oxygenation was 93% on 3 liters.  His labs are significant for an elevated creatinine of 1.97, which is up from 0.71 on 07/20.  His sodium and potassium were normal at 139 and 3.9, and BUN was up at 85.  His white blood cell count was 15.7.  Urinalysis was not that exciting, with 13 white blood cells and 7 RBC, and he denies any urinary symptoms at this time.  His lactic acid was 2.2.  Procalcitonin was 5.22.  VBG reveals pH of 7.43, CO2 of 34, O2 of 64.       His chest x-ray reveals an extensive opacity of the left lower lobe, but otherwise, lungs are clear without any evidence of pleural effusion or edema.     He has history of bladder cancer but has not followed up by urology yet.     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     Cachectic (H)     Pneumonia       Past Surgical History   No relevant history    Prior to Admission Medications   Prior to Admission Medications   Prescriptions Last Dose Informant Patient Reported? Taking?   Nasal Dilators (BREATHE RIGHT ADVANCED) STRP   Yes No   Sig: daily   Oxymetazoline HCl (AFRIN 12 HOUR NA)   Yes Yes   Sig: Spray in nostril twice a week    PULMICORT FLEXHALER 180 MCG/ACT inhaler   No Yes   Sig: TAKE 2 PUFF BY MOUTH TWICE DAILY   Pseudoephedrine HCl (SUDAFED PO)   Yes Yes   Sig: Take by mouth every morning Dose unknown   SPIRIVA HANDIHALER 18 MCG inhalation capsule   No Yes   Sig: INHALE 1 CAPSULE BY MOUTH INTO THE LUNGS DAILY   fluticasone (FLONASE) 50 MCG/ACT spray prn  Yes Yes   Sig: Spray 1 spray into both nostrils daily as needed for rhinitis    tamsulosin (FLOMAX) 0.4 MG capsule Past  Month at Unknown time  Yes Yes   Sig: Take 0.4 mg by mouth once a week      Facility-Administered Medications: None     Current Facility-Administered Medications   Medication Dose Route Frequency     enoxaparin  40 mg Subcutaneous Q24H     sodium chloride (PF)  10 mL Intracatheter Once     fluticasone furoate  1 puff Inhalation Daily     tiotropium  18 mcg Inhalation Daily     tamsulosin  0.4 mg Oral Weekly     cefTRIAXone  2 g Intravenous Q24H     azithromycin  250 mg Oral Q24H     Current Facility-Administered Medications   Medication Last Rate     - MEDICATION INSTRUCTIONS -       - MEDICATION INSTRUCTIONS -       Allergies   Allergies   Allergen Reactions     Ciprofloxacin      Clindamycin Hcl      Hydrocodone      Was no help in the past     Tylenol With Codeine [Acetaminophen-Codeine]      He things he passed out with this       Social History    reports that he has been smoking Cigars.  He has been smoking about 0.10 packs per day. He has never used smokeless tobacco. He reports that he drinks about 0.5 oz of alcohol per week  He reports that he does not use illicit drugs.    Family History   Reviewed and no h/o cardiomyopathy or premature CAD in family  Review of Systems   The comprehensive 10 point Review of Systems is negative other than noted in the HPI or here.     Physical Exam   Vital Signs with Ranges  Temp:  [97.3  F (36.3  C)-98  F (36.7  C)] 97.5  F (36.4  C)  Pulse:  [120-133] 121  Heart Rate:  [] 96  Resp:  [18-34] 20  BP: ()/() 101/49  FiO2 (%):  [100 %] 100 %  SpO2:  [85 %-96 %] 95 %  Wt Readings from Last 4 Encounters:   08/10/17 64 kg (141 lb)   07/20/17 63.5 kg (140 lb)   05/30/17 65.4 kg (144 lb 3.2 oz)   08/16/16 61.2 kg (135 lb)     I/O last 3 completed shifts:  In: 740 [P.O.:540; I.V.:200]  Out: 1000 [Urine:1000]      Vitals: /49 (BP Location: Left arm)  Pulse 121  Temp 97.5  F (36.4  C) (Oral)  Resp 20  SpO2 95%    Constitutional:   fatigued   Eyes:    extra-ocular muscles intact   ENT:   sinuses nontender on palpation   Neck:   no jugular venous distension   Hematologic / Lymphatic:   no cervical lymphadenopathy   Back:   symmetric   Lungs:   crackles left base and left anterior   Cardiovascular:   normal S1 and S2 and no murmur noted   Abdomen:   normal bowel sounds and non-distended     Musculoskeletal:   no lower extremity pitting edema present   Neurologic:   Sleepy, moves all 4 limbs     Skin:   no rashes         Recent Labs  Lab 09/17/17  1214 09/17/17  0617 09/17/17  0340   TROPI 0.567* 0.972* 0.470*         Recent Labs  Lab 09/17/17  1214 09/17/17  0617 09/17/17  0340 09/16/17  0750 09/15/17  2149   WBC  --   --  19.4*  --  15.7*   HGB  --   --  15.6  --  14.9   MCV  --   --  95  --  93   PLT  --   --  249  --  253   NA  --   --  139 139 136   POTASSIUM  --   --  3.4 3.9 3.9   CHLORIDE  --   --  107 104 100   CO2  --   --  24 27 23   BUN  --   --  50* 69* 85*   CR  --   --  0.94 1.36* 1.97*   GFRESTIMATED  --   --  77 50* 33*   GFRESTBLACK  --   --  >90 61 40*   ANIONGAP  --   --  8 8 13   LTEHA  --   --  8.2* 8.0* 8.8   GLC  --   --  146* 98 154*   ALBUMIN  --   --  1.6*  --  2.0*   PROTTOTAL  --   --   --   --  6.2*   BILITOTAL  --   --   --   --  0.8   ALKPHOS  --   --   --   --  93   ALT  --   --   --   --  15   AST  --   --   --   --  20   TROPI 0.567* 0.972* 0.470*  --   --      No results for input(s): CHOL, HDL, LDL, TRIG, CHOLHDLRATIO in the last 35050 hours.    Recent Labs  Lab 09/17/17  0340 09/15/17  2149   WBC 19.4* 15.7*   HGB 15.6 14.9   HCT 45.7 42.1   MCV 95 93    253       Recent Labs  Lab 09/15/17  2205   PHV 7.43   PO2V 59*   PCO2V 34*   HCO3V 23       Recent Labs  Lab 09/17/17  0340   NTBNPI 962     No results for input(s): DD in the last 168 hours.  No results for input(s): SED, CRP in the last 168 hours.    Recent Labs  Lab 09/17/17  0340 09/15/17  2149    253     No results for input(s): TSH in the last 168  hours.    Recent Labs  Lab 09/15/17  2252   COLOR Yellow   APPEARANCE Slightly Cloudy   URINEGLC Negative   URINEBILI Negative   URINEKETONE Negative   SG 1.019   UBLD Moderate*   URINEPH 5.5   PROTEIN 30*   NITRITE Negative   LEUKEST Negative   RBCU 7*   WBCU 13*       Imaging:  Recent Results (from the past 48 hour(s))   Chest XR,  PA & LAT    Narrative    CHEST TWO VIEWS  9/15/2017 10:29 PM     COMPARISON: None.    HISTORY: Cough and hypoxic.      Impression    IMPRESSION: There is extensive airspace opacity throughout the left  lower lobe. The lungs are otherwise clear. There is no pleural  effusion or pneumothorax. Heart size is normal with no evidence for  congestive failure.    JAYDON GOMES MD   XR Chest Port 1 View    Narrative    XR CHEST PORT 1 VIEW  9/17/2017 3:36 AM      HISTORY: Chest pain.     COMPARISON: 9/15/2017.    FINDINGS: Upright portable chest. There is again extensive infiltrate  in the left lung, particularly at the lung base. Probable left pleural  effusion. The right lung is clear. No pneumothorax.      Impression    IMPRESSION: Large left lung infiltrate.    LIU MARTINI MD   CT Chest/Abdomen/Pelvis w Contrast    Narrative    CT CHEST, ABDOMEN AND PELVIS WITH CONTRAST 9/17/2017 9:50 AM    HISTORY: Hypoxic. Tachycardia. Pleuritic chest pain. History of  bladder cancer.      TECHNIQUE: 73 mL Isovue 370. Axial images with coronal  reconstructions. Radiation dose for this scan was reduced using  automated exposure control, adjustment of the mA and/or kV according  to patient size, or iterative reconstruction technique.    COMPARISON: None.    FINDINGS:      Chest: The pulmonary arteries are well-opacified. No CT evidence for  acute pulmonary embolus. No convincing significant adenopathy. Marked  paucity of subcutaneous fat.    Extensive advanced bullous and emphysematous changes in both lungs.  Prominent dependent atelectasis at the posterior right lung base.  There is an unusual  appearance to the left lung. Extensive infiltrate  involving the majority of the left lower lobe and likely portions of  the posterior left upper lobe. Scattered throughout the infiltrate are  numerous tiny air collections. These likely represent the numerous  blebs or bullae within the left lung surrounded by the pneumonia. This  process has an unusual flattened anterior surface superiorly,  suggesting that there may be a significant liquid component to the  infiltrate. It is possible that this represents diffusely edematous  lung rather than pneumonia.    Abdomen/pelvis: Several liver cysts. There may be mild intrahepatic  biliary dilatation. Common bile duct is within normal limits for age.  Large right renal cyst. Small left renal cyst. Multiple gallstones.  The upper abdominal organs are otherwise within normal limits.    Infrarenal abdominal aortic aneurysm measuring 7 x 6 cm in the lower  abdomen small amount of peripheral atherosclerotic plaque. Large soft  plaque most prominent distally where the opacified lumen is 1.8 cm.  The aneurysm extends to the proximal aspect of the common iliac  arteries. 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 The left common iliac artery originates from  the lower aspect of the abdominal aortic aneurysm.    The urinary bladder is moderately distended. Along the posterior right  lateral wall there is a focal mass arising from the wall measuring 2.9  x 1.8 cm, suspicious for recurrence of the known bladder cancer. It  has some peripheral calcification. Bowel and mesentery grossly normal.      Impression    IMPRESSION:    1. Large area of consolidation in the left lung, fairly low  attenuation and likely edematous lung, occupying a significant portion  of the left hemithorax. Further clarification from the patient's  physician states that the patient has been coughing up large amounts  of mucus. There could be mucoid component.  2. Extensive  advanced emphysematous and bullous changes in both lungs.  3. Gallstones.  4. 7 cm infrarenal abdominal aortic aneurysm with a large amount of  soft plaque causing significant luminal narrowing. 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  5. Suspicious for recurrence of the urinary bladder malignancy.       Echo:  No results found for this or any previous visit (from the past 4320 hour(s)).

## 2017-09-17 NOTE — CONSULTS
UROLOGY CONSULTATION:    PATIENT: Bong HENRY (1935)  AGE: 82 year old year old male    Primary Care Provider / Referring Physician:  Chaparro Carroll    CHIEF COMPLAINT:  Pneumonia due to infectious organism, unspecified laterality, unspecified part of lung  Acute respiratory failure with hypoxia (H)  Severe sepsis (H)  History of COPD  Acute kidney injury (H)  Abnormal urinalysis    HPI:   83 YO WM ADMITTED WITH FAILURE TO THRIVE AND LIKELY LEFT LOWER LOBE PNEUMONIA. IN ADDITION, HE HAS HAD 20-30 LB WEIGHT LOSS PAST 5-6 YRS. 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 HE WAS SCHEDULED FOR TURBT THEN BUT NEVER PURSUED FOR REASONS NOT CLEAR. HE HAS THUS NEVER BEEN TREATED. HE DOES HAVE ELEVATED WBC AT 15K AND HIS CREATININE IS UP AT 1.97.  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  PAST SOCIAL HISTORY  Social History     Social History Narrative     Social History   Substance Use Topics     Smoking status: Current Some Day Smoker     Packs/day: 0.10     Types: Cigars     Last attempt to quit: 2007     Smokeless tobacco: Never Used     Alcohol use 0.5 oz/week     1 Cans of beer per week     Review of Systems  @[employer]@             No current outpatient prescriptions on file.                Allergies   Allergen Reactions     Ciprofloxacin      Clindamycin Hcl      Hydrocodone      Was no help in the past     Tylenol With Codeine [Acetaminophen-Codeine]      He things he passed out with this       PHYSICAL EXAM:          BP 90/56 (BP Location: Left arm)  Pulse 103  Temp 97.9  F (36.6  C) (Oral)  Resp 18  SpO2 92%         General appearance shows A CACHETIC 83 YO WM NAD     Constitutional: awake, alert, cooperative, no apparent distress, and appears stated age  Eyes: Lids  and lashes normal, pupils equal, round and reactive to light, extra ocular muscles intact, sclera clear, conjunctiva normal  ENT: Normocephalic, without obvious abnormality, atraumatic, sinuses nontender on palpation, external ears without lesions, oral pharynx with moist mucous membranes, tonsils without erythema or exudates, gums normal and good dentition.  Hematologic / Lymphatic: no cervical lymphadenopathy  Respiratory: Mild respiratory distress  GI: No scars, normal bowel sounds, soft, non-distended, non-tender, no masses palpated, no hepatosplenomegally, non-distended and hernia present RIGHT INGUINAL HERNIA MODERATELY SIZED, NON TENDER AND DECOMPRESSES MANUALLY EASILY  Genitounirinary: phallus meatus non-circumcised and right and left testis normal  Skin: normal skin color, texture, turgor  Musculoskeletal: no lower extremity pitting edema present  there is no redness, warmth, or swelling of the joints  Neuropsychiatric: General: restless and normal eye contact    Data   Results for orders placed or performed during the hospital encounter of 09/15/17 (from the past 24 hour(s))   Comprehensive metabolic panel   Result Value Ref Range    Sodium 136 133 - 144 mmol/L    Potassium 3.9 3.4 - 5.3 mmol/L    Chloride 100 94 - 109 mmol/L    Carbon Dioxide 23 20 - 32 mmol/L    Anion Gap 13 3 - 14 mmol/L    Glucose 154 (H) 70 - 99 mg/dL    Urea Nitrogen 85 (H) 7 - 30 mg/dL    Creatinine 1.97 (H) 0.66 - 1.25 mg/dL    GFR Estimate 33 (L) >60 mL/min/1.7m2    GFR Estimate If Black 40 (L) >60 mL/min/1.7m2    Calcium 8.8 8.5 - 10.1 mg/dL    Bilirubin Total 0.8 0.2 - 1.3 mg/dL    Albumin 2.0 (L) 3.4 - 5.0 g/dL    Protein Total 6.2 (L) 6.8 - 8.8 g/dL    Alkaline Phosphatase 93 40 - 150 U/L    ALT 15 0 - 70 U/L    AST 20 0 - 45 U/L   CBC with platelets differential   Result Value Ref Range    WBC 15.7 (H) 4.0 - 11.0 10e9/L    RBC Count 4.53 4.4 - 5.9 10e12/L    Hemoglobin 14.9 13.3 - 17.7 g/dL    Hematocrit 42.1 40.0 - 53.0 %     MCV 93 78 - 100 fl    MCH 32.9 26.5 - 33.0 pg    MCHC 35.4 31.5 - 36.5 g/dL    RDW 13.7 10.0 - 15.0 %    Platelet Count 253 150 - 450 10e9/L    Diff Method Automated Method     % Neutrophils 85.1 %    % Lymphocytes 3.3 %    % Monocytes 9.6 %    % Eosinophils 0.1 %    % Basophils 0.2 %    % Immature Granulocytes 1.7 %    Nucleated RBCs 0 0 /100    Absolute Neutrophil 13.4 (H) 1.6 - 8.3 10e9/L    Absolute Lymphocytes 0.5 (L) 0.8 - 5.3 10e9/L    Absolute Monocytes 1.5 (H) 0.0 - 1.3 10e9/L    Absolute Eosinophils 0.0 0.0 - 0.7 10e9/L    Absolute Basophils 0.0 0.0 - 0.2 10e9/L    Abs Immature Granulocytes 0.3 0 - 0.4 10e9/L    Absolute Nucleated RBC 0.0    Blood culture   Result Value Ref Range    Specimen Description Blood Left Arm     Special Requests Aerobic and anaerobic bottles received     Culture Micro No growth after 17 hours    Procalcitonin   Result Value Ref Range    Procalcitonin 5.22 ng/ml   ISTAT gases lactate neha POCT   Result Value Ref Range    Ph Venous 7.43 7.32 - 7.43 pH    PCO2 Venous 34 (L) 40 - 50 mm Hg    PO2 Venous 59 (H) 25 - 47 mm Hg    Bicarbonate Venous 23 21 - 28 mmol/L    O2 Sat Venous 91 %    Lactic Acid 2.2 (H) 0.7 - 2.1 mmol/L   Chest XR,  PA & LAT    Narrative    CHEST TWO VIEWS  9/15/2017 10:29 PM     COMPARISON: None.    HISTORY: Cough and hypoxic.      Impression    IMPRESSION: There is extensive airspace opacity throughout the left  lower lobe. The lungs are otherwise clear. There is no pleural  effusion or pneumothorax. Heart size is normal with no evidence for  congestive failure.    JAYDON GOMES MD   Blood culture   Result Value Ref Range    Specimen Description Blood Right Arm     Special Requests Aerobic and anaerobic bottles received     Culture Micro No growth after 17 hours    UA with Microscopic   Result Value Ref Range    Color Urine Yellow     Appearance Urine Slightly Cloudy     Glucose Urine Negative NEG^Negative mg/dL    Bilirubin Urine Negative NEG^Negative     Ketones Urine Negative NEG^Negative mg/dL    Specific Gravity Urine 1.019 1.003 - 1.035    Blood Urine Moderate (A) NEG^Negative    pH Urine 5.5 5.0 - 7.0 pH    Protein Albumin Urine 30 (A) NEG^Negative mg/dL    Urobilinogen mg/dL Normal 0.0 - 2.0 mg/dL    Nitrite Urine Negative NEG^Negative    Leukocyte Esterase Urine Negative NEG^Negative    Source Catheterized Urine     WBC Urine 13 (H) 0 - 2 /HPF    RBC Urine 7 (H) 0 - 2 /HPF    Mucous Urine Present (A) NEG^Negative /LPF    Hyaline Casts 4 (H) 0 - 2 /LPF   Urine Culture Aerobic Bacterial   Result Value Ref Range    Specimen Description Midstream Urine     Special Requests Specimen received in preservative     Culture Micro Culture negative < 24 hours, reincubate    Lactic acid   Result Value Ref Range    Lactic Acid 1.6 0.4 - 2.0 mmol/L   Strep pneumo Agn Ur less than 13yrs or CSF any age   Result Value Ref Range    Specimen Description Urine     BAD S pneumoniae       Canceled, Test credited  Test canceled - Lab  error      BAD S pneumoniae See BASTP    Strep pneumo Agn Ur greater or equal to 13yrs or CSF any age   Result Value Ref Range    Specimen Description Urine     S Pneumoniae Antigen       Negative, no Streptococcus pneumoniae antigen detected by immunochromatographic membrane   assay. A negative Streptococcus pneumoniae antigen result does not rule out infection with   Streptococcus pneumoniae.     Sputum Culture Aerobic Bacterial   Result Value Ref Range    Specimen Description Sputum     Culture Micro (A)      >10 Squamous epithelial cells/low power field indicates oral contamination. Please   recollect.      Culture Micro Canceled, Test credited     Culture Micro       Notification of test cancellation was given to  Victorina Owens RN [id number], @6202 9/16/17..     Gram stain   Result Value Ref Range    Specimen Description Sputum     Special Requests screen     Gram Stain (A)      >10 Squamous epithelial cells/low power field indicates oral  "contamination. Please   recollect.      Gram Stain <25 PMNs/low power field     Gram Stain Many  Mixed Gram positive bacteria present.   (A)    Basic metabolic panel   Result Value Ref Range    Sodium 139 133 - 144 mmol/L    Potassium 3.9 3.4 - 5.3 mmol/L    Chloride 104 94 - 109 mmol/L    Carbon Dioxide 27 20 - 32 mmol/L    Anion Gap 8 3 - 14 mmol/L    Glucose 98 70 - 99 mg/dL    Urea Nitrogen 69 (H) 7 - 30 mg/dL    Creatinine 1.36 (H) 0.66 - 1.25 mg/dL    GFR Estimate 50 (L) >60 mL/min/1.7m2    GFR Estimate If Black 61 >60 mL/min/1.7m2    Calcium 8.0 (L) 8.5 - 10.1 mg/dL   Nutrition Services Adult IP Consult    Narrative    Katherine Garcia, ROSALBA, LD     9/16/2017  9:54 AM  CLINICAL NUTRITION SERVICES  -  ASSESSMENT NOTE      RECOMMENDATIONS FOR MD/PROVIDER TO ORDER:     RECOMMEND SLP EVAL (pt notes difficulty swallowing solids and   liquids)       Recommendations Ordered by Registered Dietitian (RD):     Will make pt Room Service with Assist (he will be seen daily for   meal orders)    Will send a strawberry PLUS2 milkshake with every meal (each   provides 625 omero and 19 gm pro)    Ordered current wt       Malnutrition:   % Weight Loss:  Weight loss does not meet criteria for   malnutrition (3% in the past month and up from 1 yr ago)  % Intake:  No decreased intake noted (pt states he is eating per   usual - but suspect inadequate)  Subcutaneous Fat Loss:  Upper arm region  - severe depletion  Muscle Loss:  Temporal region  - severe depletion, Clavicle bone   region  - severe depletion and Acromion bone region  - severe   depletion  Fluid Retention:  None noted    Malnutrition Diagnosis: Severe malnutrition  In Context of:  Chronic illness or disease          REASON FOR ASSESSMENT  Bong Noguera is a 82 year old male seen by Registered   Dietitian for Provider Order - \"weight loss\"      NUTRITION HISTORY  Visited with pt this morning   Seemed a bit crabby and not very forthcoming with information -   not " "wanting to answer my questions  Notes that he has had difficulty swallowing \"for years\"  Says that he even has some trouble swallowing water  States that he tries to eat 4 times per day  Makes himself strawberry malts  Likes to have a blueberry muffin for breakfast  Does not like eggs  Drinks malts instead of milk  Does not note any decrease in his typical intake      CURRENT NUTRITION ORDERS  Diet Order:     Regular     Pt questioning if he will be able to get any breakfast down with   his difficulty swallowing  Ordered him a blueberry muffin, strawberry PLUS2 milkshake and   orange wedges (per his request)      PHYSICAL FINDINGS  Observed  Muscle Wasting  - temples, cheeks, clavicle, shoulder  Subcutaneous fat loss  - upper arms  Obtained from Chart/Interdisciplinary Team  Cachectic    ANTHROPOMETRICS  Height: 6'5\"  Weight: No wt taken since admit (put in order for current wt)  IBW: 94.5 kg  Weight History: Pt states he has a scale at home and has weighed   himself in the past few days - \"want to say it was 137#\"  (62.3   kg).  Pt has had gradual wt loss over the past few years - but   seems to have leveled off ~140# (was in the 130s last year).    Note pt dehydrated on admit.  Wt Readings from Last 10 Encounters:   08/10/17 64 kg (141 lb)   07/20/17 63.5 kg (140 lb)   05/30/17 65.4 kg (144 lb 3.2 oz)   08/16/16 61.2 kg (135 lb)   07/07/16 59.9 kg (132 lb)   06/30/16 61.5 kg (135 lb 9.6 oz)   02/04/16 61.7 kg (136 lb)   10/09/14 67.8 kg (149 lb 6.4 oz)   06/05/14 69.5 kg (153 lb 3.2 oz)   04/24/14 70.9 kg (156 lb 6.4 oz)         LABS  Labs reviewed    MEDICATIONS  IVF (NaCl) at 100 mL/hr    Dosing Weight 62.3 kg (pt's reported wt)    ASSESSED NUTRITION NEEDS PER APPROVED PRACTICE GUIDELINES:  Estimated Energy Needs: 5101-9439 kcals (35-40 Kcal/Kg)  Justification: underweight  Estimated Protein Needs: 75-95 grams protein (1.2-1.5 g pro/Kg)  Justification: Repletion    MALNUTRITION:  % Weight Loss:  Weight loss does " not meet criteria for   malnutrition (3% in the past month and up from 1 yr ago)  % Intake:  No decreased intake noted (pt states he is eating per   usual - but suspect inadequate)  Subcutaneous Fat Loss:  Upper arm region  - severe depletion  Muscle Loss:  Temporal region  - severe depletion, Clavicle bone   region  - severe depletion and Acromion bone region  - severe   depletion  Fluid Retention:  None noted    Malnutrition Diagnosis: Severe malnutrition  In Context of:  Chronic illness or disease    NUTRITION DIAGNOSIS:  Predicted inadequate nutrient intake related to swallowing   difficulty as evidenced by pt with muscle and fat wasting      NUTRITION INTERVENTIONS  Recommendations / Nutrition Prescription  Regular diet  Nutrition supplements    RECOMMEND SLP TORIAL (pt notes difficulty swallowing solids and   liquids)  .      Implementation  Nutrition education ---> Reviewed current diet order and   supplements  Will make pt Room Service with Assist (he will be seen daily for   meal orders)  Will send a strawberry PLUS2 milkshake with every meal (each   provides 625 omero and 19 gm pro)  Ordered a current wt  .      Nutrition Goals  Pt to consume 50% meals and 100% of the shakes being sent with   meals  .      MONITORING AND EVALUATION:  Progress towards goals will be monitored and evaluated per   protocol and Practice Guidelines                 Lactic acid level STAT   Result Value Ref Range    Lactic Acid 1.6 0.7 - 2.0 mmol/L       LABS:   No results found for: PSA  UA RESULTS:  Recent Labs   Lab Test  09/15/17   2252  07/20/17   1355   COLOR  Yellow  Red*   APPEARANCE  Slightly Cloudy   --    URINEGLC  Negative   --    URINEBILI  Negative   --    URINEKETONE  Negative   --    SG  1.019  1.020   UBLD  Moderate*   --    URINEPH  5.5  6.5   PROTEIN  30*   --    UROBILINOGEN   --   1    NITRITE  Negative  Neg   LEUKEST  Negative   --    RBCU  7*  packed   WBCU  13*  0     Creatinine   Date Value Ref Range Status    09/16/2017 1.36 (H) 0.66 - 1.25 mg/dL Final     ]  Hemoglobin   Date Value Ref Range Status   09/15/2017 14.9 13.3 - 17.7 g/dL Final   ]      ASSESSMENT:   1. Pneumonia due to infectious organism, unspecified laterality, unspecified part of lung    2. Acute respiratory failure with hypoxia (H)    3. Severe sepsis (H)    4. History of COPD    5. Acute kidney injury (H)    6. Abnormal urinalysis    7. HISTORY UNTREATED PAPILLARY BLADDER TUMOR      PLAN:    1. APPROPRIATE MANAGEMENT OF LIKELY LLL PNEUMONIA PER PRIMARY  2. WOULD ADVISE CT CHEST , ABD, AND PELVIS THIS HOSPITAL STAY. HOPEFULLY WITH IV CONTRAST AFTER DEHYDRATION STATUS REVERSED.  3. MAY BE BEST TO CONSIDER TURBT PRIOR TO DISMISSAL IF PATIENT ALLOWS AND MEDICALLY STABLE/ CLEARED FOR PROCEDURE.  4. WE WILL FOLLOW CLOSELY AS HE IMPROVES. WILL DISCUSS WITH DR MARLEY.    Leobardo Jean Baptiste

## 2017-09-17 NOTE — PLAN OF CARE
Problem: Goal Outcome Summary  Goal: Goal Outcome Summary  Outcome: Improving  A&O x 3, forgetful, BP low 80's- 90's/ 40's- 50's, a symptomatic, given 500 cc LR bolus,  on 5l o2 SATs 93- 94%, LS expi. wheezing and  diminished  Bases, coughing up yellowish phlegm, troponin elevated,had echo, CT abd and chest, cardiology consult pending, seen by vascular surgery, on scheduled nebs, encouraged to use acapella,  d/c pending  Progress.

## 2017-09-17 NOTE — PLAN OF CARE
Problem: Goal Outcome Summary  Goal: Goal Outcome Summary  PT/OT: Per discussion with RN and chart review, pt not appropriate for evals today, rescheduled.

## 2017-09-17 NOTE — PROGRESS NOTES
[facility]    Hospitalist Progress Note    Assessment & Plan   Bong Noguera is a 82 year old male with a past medical history of COPD and known bladder tumor who was admitted on 9/15/2017 for suspected LLL pneumonia. Patient came in complaining of increasing generalized weakness and was found to have an infiltrate on imaging in the ED.  His hospital course is complicated by a period of hypotension and respiratory distress on 9/17 that required a RRT.  It was felt to be due to recent diuresis and improved after IVF.     # Acute Hypoxic Respiratory Failure, Likely 2/2 Pneumonia  Patient has had increasing O2 requirements and is now up to 15 L with saturations in the low 90s.  I am concerned that the patient may have an underlying PE that could be contributing as well.  I am not convinced this patient has pulmonary edema given his CXR and no other signs of fluid overload and his breath sounds are likely related to his pneumonia   - CT Chest   - RTAC ordered to further evaluate  - Hopeful down-titration of O2     # Shock, septic vs cardiogenic vs hypovolemic  Patient continues to have tenuous blood pressures.  He does have an underlying infection with the LLL pneumonia but could also be due to undiagnosed PE or hypovolemia from recent diuresis and poor PO intake.  On exam the patient does appear to be perfusing well though as his skin is still warm   - Lactic acid level ordered  - Will give small fluid boluses as needed     # Left Lower Lobe Pneumonia due to Unspecified Organism  Large infiltrate seen on CXR with an increasing white blood cell count  - Continue IV Ceftriaxone and Azithromycin  - If patient continues to deteriorate will consider ID consult for help with antibiotic management in a patient who does not appear to be responding to current antibiotic regimen.      # Tropinemia  I suspect this is related to NSTEMI type II given the recent period of hypotension but I am concerned there may  "be underlying ischemia that could be contributing to the patient's hypotension.  - 3rd set of troponins at noon  - Echocardiogram ordered  - Cardiology consult placed and appreciate their recommendations    # Pleuritic Chest Pain   Concern for possible PE given patients inactivity and malignancy.  If CT negative likely costochondritis.    - Morphine 2 mg IV x1    # Bladder Tumor  Urology consulted and appreciate their recommendations  - CT Chest/Abdomen/Pelvis    # Cachexia  Likely related to the patient's malignancy  - Nutrition consult has been placed and appreciate their recommendations    # Acute Kidney Injury  Suspect pre-renal related to hypovolemia.  Improved with fluids.     # History of COPD   Do not believe patient is in exacerbation but if respiratory status does not improve may consider steroids  - Continue ICS    DVT Prophylaxis: Enoxaparin (Lovenox) SQ  Code Status: Full Code    Disposition:  Patient is still tenuous and expected discharge is unknown.  Patient's O2 saturations and blood pressure needs to improve before further consideration.  Patient may also require placement for rehab but will wait on PT/OT evaluation until his condition improves.     Mariano Singer, DO  Text Page (7am to 6pm)    Interval History   Patient seen and examined.  At this time he feels as if things are improving though continues to complain of left sided chest pain.  He states that it is pleuritic in nature and rates it as a 4/10.  He has tried Tylenol without any improvement.  Does admit to being inactive at home but no recent surgeries or long travel.  He also states that he has \"bubbles\" when he breaths but has not been coughing up any mucous though he did have a productive cough while I was in the room.  No other complaints at this time.      At approximately 0330 today a RRT was called as the patient was hypotensive, tachycardiac and hypoxic.  This was thought to be related to his LLL pneumonia and recent lasix " use.  He was given a small IV bolus of NS, labs, EKG and CXR were obtained.     Of note, I discussed code status with the patient again given his declining status.  At this time he would like to be a full code.     -Data reviewed today: I reviewed all new labs and imaging results over the last 24 hours. I personally reviewed the:  EKG:  Sinus tachycardia with PAC.  Rate 136 BPM.     CXR:  Left lower lobe infiltrate.  No cardiomegaly.  No signs of pulmonary congestion     Physical Exam   Temp: 98  F (36.7  C) Temp src: Oral BP: 107/72 Pulse: 121 Heart Rate: 110 Resp: 22 SpO2: 92 % O2 Device: Oxymask Oxygen Delivery: 15 LPM  There were no vitals filed for this visit.  Vital Signs with Ranges  Temp:  [97.6  F (36.4  C)-98.5  F (36.9  C)] 98  F (36.7  C)  Pulse:  [103-133] 121  Heart Rate:  [] 110  Resp:  [18-34] 22  BP: ()/() 107/72  SpO2:  [85 %-94 %] 92 %  I/O last 3 completed shifts:  In: 1853.33 [P.O.:500; I.V.:1353.33]  Out: 1150 [Urine:1150]    Constitutional: Awake, alert, cooperative, no apparent distress.  Neck:  No JVD   Respiratory: Coarse breath sounds throughout.  Tachypneic  Chest:  Pain present with palpation of the left lower ribs anteriorly  Cardiovascular: Difficult to auscultate over breath sounds.Tachycardiac, normal S1 and S2, and no murmur noted  GI: Midline abdominal mass palpated. Non-tender  Skin/Integumen: Skin is warm with no rashes, no cyanosis, no edema.    Medications     - MEDICATION INSTRUCTIONS -       - MEDICATION INSTRUCTIONS -         morphine  2 mg Intravenous Once     fluticasone furoate  1 puff Inhalation Daily     tiotropium  18 mcg Inhalation Daily     tamsulosin  0.4 mg Oral Weekly     cefTRIAXone  2 g Intravenous Q24H     azithromycin  250 mg Oral Q24H     enoxaparin  30 mg Subcutaneous Q24H       Data     Recent Labs  Lab 09/17/17  0617 09/17/17  0340 09/16/17  0750 09/15/17  2149   WBC  --  19.4*  --  15.7*   HGB  --  15.6  --  14.9   MCV  --  95  --  93    PLT  --  249  --  253   NA  --  139 139 136   POTASSIUM  --  3.4 3.9 3.9   CHLORIDE  --  107 104 100   CO2  --  24 27 23   BUN  --  50* 69* 85*   CR  --  0.94 1.36* 1.97*   ANIONGAP  --  8 8 13   LETHA  --  8.2* 8.0* 8.8   GLC  --  146* 98 154*   ALBUMIN  --  1.6*  --  2.0*   PROTTOTAL  --   --   --  6.2*   BILITOTAL  --   --   --  0.8   ALKPHOS  --   --   --  93   ALT  --   --   --  15   AST  --   --   --  20   TROPI 0.972* 0.470*  --   --        Imaging:   Recent Results (from the past 24 hour(s))   XR Chest Port 1 View    Narrative    XR CHEST PORT 1 VIEW  9/17/2017 3:36 AM      HISTORY: Chest pain.     COMPARISON: 9/15/2017.    FINDINGS: Upright portable chest. There is again extensive infiltrate  in the left lung, particularly at the lung base. Probable left pleural  effusion. The right lung is clear. No pneumothorax.      Impression    IMPRESSION: Large left lung infiltrate.    LIU MARTINI MD         ADDENDUM:   CT Chest/Abdomen/Pelvis came back (see results below) and this was negative for PE.  It did show a 7 cm AAA though and spoke to radiology regarding this.  It appears this has enlarged from CT scan 1 year ago.  Given the size and its increased size will consult with vascular surgery though I believe this patient to be a poor surgical candidate.     CT Chest/Abdomen/Pelvis w Contrast    Narrative    CT CHEST, ABDOMEN AND PELVIS WITH CONTRAST 9/17/2017 9:50 AM    HISTORY: Hypoxic. Tachycardia. Pleuritic chest pain. History of  bladder cancer.      TECHNIQUE: 73 mL Isovue 370. Axial images with coronal  reconstructions. Radiation dose for this scan was reduced using  automated exposure control, adjustment of the mA and/or kV according  to patient size, or iterative reconstruction technique.    COMPARISON: None.    FINDINGS:      Chest: The pulmonary arteries are well-opacified. No CT evidence for  acute pulmonary embolus. No convincing significant adenopathy. Marked  paucity of subcutaneous  fat.    Extensive advanced bullous and emphysematous changes in both lungs.  Prominent dependent atelectasis at the posterior right lung base.  There is an unusual appearance to the left lung. Extensive infiltrate  involving the majority of the left lower lobe and likely portions of  the posterior left upper lobe. Scattered throughout the infiltrate are  numerous tiny air collections. These likely represent the numerous  blebs or bullae within the left lung surrounded by the pneumonia. This  process has an unusual flattened anterior surface superiorly,  suggesting that there may be a significant liquid component to the  infiltrate. It is possible that this represents diffusely edematous  lung rather than pneumonia.    Abdomen/pelvis: Several liver cysts. There may be mild intrahepatic  biliary dilatation. Common bile duct is within normal limits for age.  Large right renal cyst. Small left renal cyst. Multiple gallstones.  The upper abdominal organs are otherwise within normal limits.    Infrarenal abdominal aortic aneurysm measuring 7 x 6 cm in the lower  abdomen small amount of peripheral atherosclerotic plaque. Large soft  plaque most prominent distally where the opacified lumen is 1.8 cm.  The aneurysm extends to the proximal aspect of the common iliac  arteries. 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 The left common iliac artery originates from  the lower aspect of the abdominal aortic aneurysm.    The urinary bladder is moderately distended. Along the posterior right  lateral wall there is a focal mass arising from the wall measuring 2.9  x 1.8 cm, suspicious for recurrence of the known bladder cancer. It  has some peripheral calcification. Bowel and mesentery grossly normal.      Impression    IMPRESSION:    1. Large area of consolidation in the left lung, fairly low  attenuation and likely edematous lung, occupying a significant portion  of the left hemithorax.  Further clarification from the patient's  physician states that the patient has been coughing up large amounts  of mucus. There could be mucoid component.  2. Extensive advanced emphysematous and bullous changes in both lungs.  3. Gallstones.  4. 7 cm infrarenal abdominal aortic aneurysm with a large amount of  soft plaque causing significant luminal narrowing. 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  5. Suspicious for recurrence of the urinary bladder malignancy.

## 2017-09-17 NOTE — CONSULTS
Reason for consult: 7cm Infrarenal Abdominal aortic aneurysm    HPI: 83 Yo Male w/ history of COPD and bladder cancer admitted on 9/15 for LLE pneumonia/weakness, currently with Acute respiratory failure, shock - septic and possible cardiogenic component, NSTEMI and cachexia, [place].  Vascular surgery consulted for finding of 7cm infrarenal abdominal aortic aneurysm.  Patient had no knowledge of this prior.  Denies any abdominal pain, indicates occasional sharp pain on left chest with deep breath.  Had RRT overnight for hypoxia, was on 15L supplemental O2. He received a breathing treatment and lasix and now is down to 5L simple mask talking comfortably.  Patient is on zithromax and rocephin for pneumonia.    PMH:  COPD  Papillary bladder cancer, BPH on flomax  Previous tobacco use    PSH:  Bilateral knee arthroplasty    Allergies: Cipro, clindamycin, hydrocodone, tylenol with codeine    Social History: Quit tobacco in 2007,  Significant use prior, uses nicotine gum; one beer per week, lives alone    Family History: Denies family history of aneurysms, arterial or venous disorders, bleeding or clotting problems    Vitals:  97.3, SBP 80-90s, HR 90-100s, 5L mask 91%, RR 24    Physical Exam:  Alert, no acute distress, thin  Decreased breath sounds on Left, mild wheeze  RRR  Abd soft, palpable abdominal mass, nontender, no guarding or rigidity  Palpable radial, femoral, DP pulses bilaterally  No significant LE edema, prominent LE veins    WBC 19 from 15  Cr 0.9  Hg 15  Troponin 0.97 from 0.47 this am  Albumin 1.6    Sputum culture: mixed G+  Echo completed today    CTA chest/abd/pelvis: 7cm infrarenal abdominal aortic aneurysm, mass in pelvis noted, LLL pneumonia    Assessment:  83 yo male with bladder cancer, COPD, severe pneumonia, NSTEMI with 7cm infrarenal AAA    Plan:  Patient currently is high risk for repair due to active medical problems, infection needs to resolve prior to consideration for repair.    Endovascular  repair is a possibility although left external iliac artery is small for access and thus patient would require endologix graft.  Left common femoral disease is present also and would necessitate cut down.    Will follow closely for patient's medical course.  If he continues to improve will consider repair in 2-3 weeks.    Encouraged increased nutrition. DVT prophylaxis.  Antibiotics for pneumonia.  Patient understands and all questions were answered.  Further recommendations to follow.    Patient seen with Dr. Nath who is in agreement of the above plan.    Laureen Morales MD  Vascular Surgery Fellow  Pager [phone]    I have seen and examined the patient and I agree with Dr. Morales's documentation. 82 year old male with recently found asymptomatic 7 cm AAA. The abdomen and AAA are non tender. He has an ongoing pneumonia and also has had a NSTEMI. In Light of this we will have to delay the repair of this anueyrsm. Will wait 2-3 weeks for resolution of infection before doing placing an endograft in this gentleman. He will not survive and open operation due to the pulmonary and cardiovascular morbidity involved. We will follow him closely.  Agapito Nath M.D.

## 2017-09-18 NOTE — PLAN OF CARE
"Problem: Goal Outcome Summary  Goal: Goal Outcome Summary  PT: Orders received and chart reviewed. Eval attempted, pt awake and alert, semi reclined in bed. Pt refusing to attempt to mobilize to EOB or up to chair. When offered encouragement for mobility and benefits of activity pt stated, \"why don't you come a little closer, I think I could strangle you.\" Nursing present at the time. Recommend nursing to assist pt up to chair if/when patient agrees, ceiling lift if needed. Will attempt to check back as schedule allows.          "

## 2017-09-18 NOTE — PROVIDER NOTIFICATION
MD Notification    Notified Person:  MD    Notified Persons Name: Dr. Singer    Notification Date/Time: 9/18/2017 at 0940    Notification Interaction:  Talked with Physician    Purpose of Notification: Needing BP parameters for Lasix. BP 98/63. Do you still want me to give one time dose Lasix 20mg?    Orders Received: Per Elle Graves, Charge RN, MD asks that we administer one time dose Lasix 20mg now.    Comments:

## 2017-09-18 NOTE — CONSULTS
[facility]    Vascular Medicine Consultation     Attestation: I have examined the patient independently of Bela Hill PA-C and agree with the examination and plan as delineated below.    Jaron Kilpatrick MD     Date of Admission:  9/15/2017  Date of Consult (When I saw the patient): 09/18/17    Assessment & Plan   1. Asymptomatic 7.0 cm infrarenal AAA     This was incidentally noted on CT this admission. It will ultimately need repair to prevent rupture. However, he needs to fully recover from his pneumonia prior to this taking place. No further recommendations from a Vascular Medicine standpoint. Should he survive this hospitalization and eventually go on to have AAA repair, then we can follow again at that time. Otherwise, Vascular Medicine will sign off. Please call if we can be of any further assistance this admission ([phone]).     2. Hyperlipidemia    His lipid panel today was significant for an LDL of 26. He was already started on Lipitor by Cardiology. He should continue the same.     3. Nicotine abuse    He has a prior history of smoking cigars, but states he has not smoked any in over 10 years. Ongoing cessation from nicotine products was encouraged as this would only increase the size of his abdominal aneurysm.     4. Left lower lobe pneumonia    This is currently being treated with antibiotics. His white blood cell count remains high and he still is requiring 10 liters of oxygen via nasal cannula. Continue current cares per Hospitalist service.    5. Acute renal failure, resolved    His renal function was poor upon admission, likely secondary to dehydration. This has since improved and his renal function has normalized.     6. Cardiomyopathy    Moderate to severe left ventricular systolic dysfunction with an EF of 30-35% on echocardiogram this admission. Cardiology is following.     7. Type 2 myocardial infarction    This likely occurred in the setting of pneumonia and  cardiomyopathy. Cardiology following. Continue medical management and get a stress nuclear study later.     8. Bladder Cancer    Urology evaluated the patient. He had failed to follow up as an outpatient. They are recommending a TURBT prior to discharge if he is medically cleared.       Reason for Consult   Reason for consult: Asked by Dr. Nath to evaluate vascular risk factors in this 82 year old male former smoker who was incidentally noted on CT to have a 7 cm infrarenal AAA.     Primary Care Physician   Chaparro Carroll      History of Present Illness   Bong Noguera is a 82 year old male former smoker with bladder cancer who presented to the ER with progressive generalized weakness over the past few years. He has had chornic pain in left chest with deep breathing, productive cough of yellow sputum, and shortness of breath. On presentation, he was noted to be tachycardic and hypotensive with leukocytosis and acute renal failure. Chest x-ray was significant for a left lower lobe opacity. He was started on antibiotics and fluids and admitted for dehydration, sepsis, and pneumonia. During additional work-up, a CT of the chest/abdomen/pelvis was undertaken and incidentally noted a 7 cm infrarenal AAA. He was seen by Vascular Surgery and plan would be for endovascular repair, but only after he recovers from his pneumonia. We were asked by Vascular Surgery to evaluate him and optimize any vascular risk factors.     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 Taking?   Nasal Dilators (BREATHE RIGHT ADVANCED) STRP   Yes No   Sig: daily   Oxymetazoline HCl (AFRIN 12 HOUR NA)   Yes Yes   Sig: Spray in nostril  twice a week    PULMICORT FLEXHALER 180 MCG/ACT inhaler   No Yes   Sig: TAKE 2 PUFF BY MOUTH TWICE DAILY   Pseudoephedrine HCl (SUDAFED PO)   Yes Yes   Sig: Take by mouth every morning Dose unknown   SPIRIVA HANDIHALER 18 MCG inhalation capsule   No Yes   Sig: INHALE 1 CAPSULE BY MOUTH INTO THE LUNGS DAILY   fluticasone (FLONASE) 50 MCG/ACT spray prn  Yes Yes   Sig: Spray 1 spray into both nostrils daily as needed for rhinitis    tamsulosin (FLOMAX) 0.4 MG capsule Past Month at Unknown time  Yes Yes   Sig: Take 0.4 mg by mouth once a week   tamsulosin (FLOMAX) 0.4 MG capsule   No No   Sig: TAKE 1 CAPSULE BY MOUTH DAILY      Facility-Administered Medications: None     Allergies   Allergies   Allergen Reactions     Ciprofloxacin      Clindamycin Hcl      Hydrocodone      Was no help in the past     Tylenol With Codeine [Acetaminophen-Codeine]      He things he passed out with this       Social History   Bong ASTER Noguera reports that he has not been smoking Cigars for over 10 years. He reports that he drinks about 0.5 oz of alcohol per week  He reports that he does not use illicit drugs.    Family History   No family history on file.    Review of Systems   The 10 point Review of Systems is negative other than noted in the HPI or here.     Physical Exam   Temp: 97.8  F (36.6  C) Temp src: Oral BP: 108/64   Heart Rate: 93 Resp: 18 SpO2: 93 % O2 Device: High Flow Nasal Cannula (HFNC) Oxygen Delivery: 10 LPM  Vital Signs with Ranges  Temp:  [97.5  F (36.4  C)-99.7  F (37.6  C)] 97.8  F (36.6  C)  Heart Rate:  [] 93  Resp:  [18-20] 18  BP: ()/(49-68) 108/64  FiO2 (%):  [100 %] 100 %  SpO2:  [88 %-97 %] 93 %  0 lbs 0 oz    Constitutional: awake, alert, cooperative, no apparent distress, and appears stated age  Eyes: Lids and lashes normal, pupils equal, round and reactive to light, extra ocular muscles intact, sclera clear, conjunctiva normal  ENT: normocepalic, without obvious abnormality, oropharynx pink and  moist  Hematologic / Lymphatic: no lymphadenopathy  Respiratory: No increased work of breathing, good air exchange, clear to auscultation bilaterally, no crackles or wheezing  Cardiovascular: regular rate and rhythm, normal S1 and S2 and no murmur noted  GI: Normal bowel sounds, soft, non-distended, non-tender. Palpable AAA pulse  Skin: no redness, warmth, or swelling, no rashes and no lesions  Musculoskeletal: There is no redness, warmth, or swelling of the joints.  Full range of motion noted.  Motor strength is 5 out of 5 all extremities bilaterally.  Cachectic.   Neurologic: Awake, alert, oriented to name, place and time.  Cranial nerves II-XII are grossly intact.  Motor is 5 out of 5 bilaterally.    Neuropsychiatric:  Normal affect, memory, insight.  Pulses: Prominent pulse in AAA. Unable to palpate pedal pulses.     Data   Most Recent 3 CBC's:  Recent Labs   Lab Test  09/17/17   0340  09/15/17   2149  07/20/17   1438   WBC  19.4*  15.7*  9.9   HGB  15.6  14.9  13.5   MCV  95  93  93.8   PLT  249  253  165     Most Recent 3 BMP's:  Recent Labs   Lab Test  09/17/17   0340  09/16/17   0750  09/15/17   2149   NA  139  139  136   POTASSIUM  3.4  3.9  3.9   CHLORIDE  107  104  100   CO2  24  27  23   BUN  50*  69*  85*   CR  0.94  1.36*  1.97*   ANIONGAP  8  8  13   LETHA  8.2*  8.0*  8.8   GLC  146*  98  154*     Most Recent Cholesterol Panel:  Recent Labs   Lab Test  09/18/17   0845   CHOL  58   LDL  26   HDL  13*   TRIG  97     Most Recent Hemoglobin A1c:  Recent Labs   Lab Test  02/09/16   1215   A1C  5.8*

## 2017-09-18 NOTE — PROGRESS NOTES
[facility] Cardiology Progress Note       Assessment and Plan:   1.  Left sided pneumonia.  2.  Cardiomyopathy:  Moderate to severe LV systolic dysfunction.  3.  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  4.  Bladder cancer  5.  Abdominal aortic aneurysm:  7 cm  6.  Renal failure:  Significantly improved.    Pt appears quite cachetic.  Suspect previous silent MI, with pneumonia resulting in type 2 MI.    Recommend conservative management, add coreg, diurese as necessary.  Stress nuclear study later, intervene only if large amounts of myocardium in jeopardy.            Interval History:   Not Short of breath at rest.  Wants to go home.                  Medications:     Current Facility-Administered Medications   Medication     carvedilol (COREG) tablet 3.125 mg     No lozenges or gum should be given while patient on BIPAP/AVAPS/AVAPS AE     hypromellose-dextran (ARTIFICAL TEARS) ophthalmic solution 1 drop     Patient may continue current oral medications     ipratropium - albuterol 0.5 mg/2.5 mg/3 mL (DUONEB) neb solution 3 mL     enoxaparin (LOVENOX) injection 40 mg     sodium chloride (PF) 0.9% PF flush 10 mL     sodium chloride (OCEAN) 0.65 % nasal spray 1-2 spray     aspirin EC EC tablet 81 mg     atorvastatin (LIPITOR) tablet 20 mg     fluticasone (FLONASE) 50 MCG/ACT spray 1 spray     pseudoePHEDrine (SUDAFED) tablet 30 mg     fluticasone furoate (ARNUITY ELLIPTA) 100 MCG/ACT inhalation powder 1 puff     tiotropium (SPIRIVA) capsule 18 mcg     tamsulosin (FLOMAX) capsule 0.4 mg     potassium chloride SA (K-DUR/KLOR-CON M) CR tablet 20-40 mEq     potassium chloride (KLOR-CON) Packet 20-40 mEq     potassium chloride 10 mEq in 100 mL intermittent infusion     potassium chloride 10 mEq in 100 mL intermittent infusion with 10 mg lidocaine     potassium chloride 20 mEq in 50 mL intermittent infusion     acetaminophen (TYLENOL) tablet 650 mg     naloxone (NARCAN) injection 0.1-0.4 mg     melatonin  tablet 1 mg     senna-docusate (SENOKOT-S;PERICOLACE) 8.6-50 MG per tablet 1-2 tablet     polyethylene glycol (MIRALAX/GLYCOLAX) Packet 17 g     bisacodyl (DULCOLAX) Suppository 10 mg     ondansetron (ZOFRAN-ODT) ODT tab 4 mg    Or     ondansetron (ZOFRAN) injection 4 mg     cefTRIAXone (ROCEPHIN) 2 g vial to attach to  ml bag for ADULTS or NS 50 ml bag for PEDS     azithromycin (ZITHROMAX) tablet 250 mg     guaiFENesin (ROBITUSSIN) 20 mg/mL solution 10 mL     nicotine polacrilex (NICORETTE) gum 2 mg             Physical Exam:   Blood pressure 98/63, pulse 121, temperature 97.8  F (36.6  C), temperature source Oral, resp. rate 18, SpO2 93 %.  Wt Readings from Last 4 Encounters:   08/10/17 64 kg (141 lb)   17 63.5 kg (140 lb)   17 65.4 kg (144 lb 3.2 oz)   16 61.2 kg (135 lb)         Vital Sign Ranges  Temperature Temp  Av.4  F (36.9  C)  Min: 97.5  F (36.4  C)  Max: 99.7  F (37.6  C)   Blood pressure Systolic (24hrs), Av , Min:85 , Max:103        Diastolic (24hrs), Av, Min:49, Max:68      Pulse No Data Recorded   Respirations Resp  Av.4  Min: 18  Max: 24   Pulse oximetry SpO2  Av.6 %  Min: 88 %  Max: 97 %         Intake/Output Summary (Last 24 hours) at 17 1057  Last data filed at 17 1000   Gross per 24 hour   Intake             1320 ml   Output              600 ml   Net              720 ml          Cardiovascular:   Normal apical impulse, regular rate and rhythm, distended ext jugular veins, normal S1 and S2, no S3 or S4, and no murmur noted   Chest clear.  No peripheral oedema         Data:     Labs:  Lab Results   Component Value Date     2017    Lab Results   Component Value Date    CHLORIDE 107 2017    Lab Results   Component Value Date    BUN 50 2017      Lab Results   Component Value Date    POTASSIUM 3.4 2017    Lab Results   Component Value Date    CO2 24 2017    Lab Results   Component Value Date    CR 0.94  09/17/2017        Lab Results   Component Value Date    WBC 19.4 (H) 09/17/2017    HGB 15.6 09/17/2017    HCT 45.7 09/17/2017    MCV 95 09/17/2017     09/17/2017     Lab Results   Component Value Date    TROPI 0.567 (HH) 09/17/2017           Attestation:  I have reviewed today's vital signs, notes, medications, labs and imaging.         DR DANNY CALIXTO MD 9/18/2017  10:57 AM

## 2017-09-18 NOTE — PLAN OF CARE
Problem: Goal Outcome Summary  Goal: Goal Outcome Summary  OT: Eval attempted. Pt sleeping upon OT arrival, briefly opened eyes to tactile/verbal stimuli, however fell back asleep.

## 2017-09-18 NOTE — PLAN OF CARE
Problem: Goal Outcome Summary  Goal: Goal Outcome Summary  Discharge Planner SLP   Patient plan for discharge: Patient did not state.  Current status: Moderate to severe oral and pharyngeal dysphagia at bedside. Patient seen for swallow treatment while in bed. Patient demonstrating overt Sx of aspiration with thin liquids via the teaspoon and ice chips due to a delay and generalized weakness. Premature entry of nectar thick liquids by spoon and cup. Intermittent Sx of aspiration via the cup. Better tolerance via the spoon but can not rule out silent aspiration. Pureed texture suspect moderate to large amount of pharyngeal retention. Patient is considered a high risk for aspiration. Recommend: 1. Downgrade diet to a DDL 1 with nectar thick liquids by spoon. 2. Upright, liquids by spoon, alternate liquids solids. Cough every few swallows. Hold diet if wet vocal quality noted. 3. SLP will f/u for diet tolerance and swallow strategies. May benefit from a video swallow study to determine aspiration risk and safest diet since boarder line safe for PO diet.   Barriers to return to prior living situation: Dysphagia and deconditioning.   Recommendations for discharge: LTC  Rationale for recommendations: Continue ST at LTC  For diet tolerance and strengthening.        Entered by: Amy Olson 09/18/2017 3:10 PM

## 2017-09-18 NOTE — PROGRESS NOTES
Vascular Surgery Progress Note      Patient seen and evaluated this am. On 10LPM but comfortable. Somewhat difficult to understand.     /68 (BP Location: Left arm)  Pulse 121  Temp 99.7  F (37.6  C) (Oral)  Resp 18  SpO2 96%    Intake/Output Summary (Last 24 hours) at 09/18/17 0809  Last data filed at 09/18/17 0551   Gross per 24 hour   Intake             1320 ml   Output              725 ml   Net              595 ml       Sputum: GPC with many epithelial cells. Most recent culture 09/16 negative to date.     General: Elderly male resting supine in NAD with LPM intact to nares.   Cor: [place].   Pulm: Unlabored breathing.   Abd: S/NT/ND. Pulsatile mass appreciated above the level of the umbilicus midline.   LE: Femoral pulses palpable. Hammer toes appreciated distally.      Assessment: Mr. Noguera is an 81 yo male with a history of bladder cancer, severe COPD (quit smoking 10 years ago), Renal insufficiency and malnutrition admitted with LLL pneumonia admitted and elevated troponin. Vascular Surgery consulted for infrarenal AAA of 7cm.     Plan:     1. Continue IV/oral Abx. Pulmonary toilette. Incentive Spirometry.   2. Stress test per Cardiology  3. Vascular will follow peripherally. Patient meets criteria for EVAR however needs medical optimization and resolution of current infection.   4. Nutrition consult    Michael Anderson MD   Vascular Surgery Fellow  Pager: [phone]

## 2017-09-18 NOTE — PROGRESS NOTES
Urology Brief Note:  Dx: Previously found bladder cancer. Pt no-showed on day of surgery and has not followed up    Assessment: Given pneumonia and various , Dr. Trujillo would like to wait 2-3 weeks before doing TURBT     Plan: Pt is scheduled for a TURBT 10/3/17 at 10:30am with Dr. Trujillo. Our office will call him with pre-op instructions next week  -Urology will sign off      Discussed exam findings, lab and imaging results and plan with Dr. Trujillo.  Please contact me with any questions or concerns.     Valeria Rios PA-C  [facility]  Pager:  [phone]  After 4pm and on weekends, please call [phone]

## 2017-09-18 NOTE — PROGRESS NOTES
[facility]    Hospitalist Progress Note    Assessment & Plan   Bong Noguera is a 82 year old male with a past medical history of COPD and known bladder tumor who was admitted on 9/15/2017 for suspected LLL pneumonia. Patient came in complaining of increasing generalized weakness and was found to have an infiltrate on imaging in the ED.  His hospital course is complicated by a period of hypotension and respiratory distress on 9/17 that required a RRT.  It was felt to be due to recent diuresis and improved after IVF.      # Acute Hypoxic Respiratory Failure, Likely 2/2 Pneumonia  Improved from yesterday but is still requiring significant O2 at 9 L. Patient may have a component of fluid overload as well given his poor EF seen on echo on 9/17.    - Appreciate RT helping with management.  Pulmonary toilet, nebulizers, IS encouraged  - Hopeful down-titration of O2   - Lasix IV x1     # Septic Shock, Likely 2/2 LLL Pneumonia  Patients blood pressure has slightly improved and has been in the 100s/60s.       # Left Lower Lobe Pneumonia due to Unspecified Organism  Large infiltrate seen on CXR with an increasing white blood cell count.  Sputum cultures with mixed gram positive bacteria, suspect due to normal bartolo.  - Continue IV Ceftriaxone and PO Azithromycin    # Newly Found HFrEF  EF 30-35%.  Etiology unclear but I suspect likely ischemic given smoking history.  Unclear if there is a component of fluid overload.  Patient has crackles on exam but no JVD or edema and patient denies any orthopnea.   - Will give a 1x dose of 20 mg IV Lasix and further evaluate    # Elevated Troponin  I suspect this is related to NSTEMI type II given the recent period of hypotension.  - Cardiology is following and appreciate their recommendations     # Pleuritic Chest Pain   Likely related to costochondritis vs pneumonia. CT PE was negative     # Bladder Tumor  Urology consulted and appreciate their recommendations     #  Cachexia  Likely related to the patient's malignancy  - Nutrition consult has been placed and appreciate their recommendations     # Acute Kidney Injury, Resolved  Suspect pre-renal related to hypovolemia as it resolved with fluids.     # History of COPD   Do not believe patient is in exacerbation but if respiratory status does not improve may consider steroids  - Continue ICS    DVT Prophylaxis: Enoxaparin (Lovenox) SQ  Code Status: Full Code    Disposition: Too be determined.  Patient still quite ill and requiring significant supplemental oxygen.  Will also likely need TCU at minimum upon discharge but is still too ill to meet with PT/OT.    Mariano Singer, DO  Text Page (7am to 6pm)    Interval History   Patient seen and examined. He is sleeping comfortably but arousable.  No complaints at this time and states he is feeling improved.  Denies pain or SOB.     -Data reviewed today: I reviewed all new labs and imaging results over the last 24 hours. I personally reviewed no images or EKG's today.    Physical Exam   Temp: 97.8  F (36.6  C) Temp src: Oral BP: 102/60   Heart Rate: 102 Resp: 18 SpO2: 93 % O2 Device: High Flow Nasal Cannula (HFNC) Oxygen Delivery: 10 LPM  There were no vitals filed for this visit.  Vital Signs with Ranges  Temp:  [97.5  F (36.4  C)-99.7  F (37.6  C)] 97.8  F (36.6  C)  Heart Rate:  [] 102  Resp:  [18-24] 18  BP: ()/(49-68) 102/60  FiO2 (%):  [100 %] 100 %  SpO2:  [88 %-97 %] 93 %  I/O last 3 completed shifts:  In: 1320 [P.O.:1320]  Out: 725 [Urine:725]    Constitutional: Somnolent but is easily arousable. No apparent distress  Neck:  No JVD present  Respiratory: Coarse breath sounds throughout.  No respiratory distress  Cardiovascular: Faint heart sounds.  Regular rate and rhythm, normal S1 and S2  GI: Midline abdominal mass palpated.  No tenderness  Skin/Integumen: No rashes, no cyanosis, no edema  Other:     Medications     - MEDICATION INSTRUCTIONS -       - MEDICATION  INSTRUCTIONS -         enoxaparin  40 mg Subcutaneous Q24H     sodium chloride (PF)  10 mL Intracatheter Once     aspirin EC  81 mg Oral Daily     atorvastatin  20 mg Oral Daily     fluticasone furoate  1 puff Inhalation Daily     tiotropium  18 mcg Inhalation Daily     tamsulosin  0.4 mg Oral Weekly     cefTRIAXone  2 g Intravenous Q24H     azithromycin  250 mg Oral Q24H       Data     Recent Labs  Lab 09/17/17  1214 09/17/17  0617 09/17/17  0340 09/16/17  0750 09/15/17  2149   WBC  --   --  19.4*  --  15.7*   HGB  --   --  15.6  --  14.9   MCV  --   --  95  --  93   PLT  --   --  249  --  253   NA  --   --  139 139 136   POTASSIUM  --   --  3.4 3.9 3.9   CHLORIDE  --   --  107 104 100   CO2  --   --  24 27 23   BUN  --   --  50* 69* 85*   CR  --   --  0.94 1.36* 1.97*   ANIONGAP  --   --  8 8 13   LETHA  --   --  8.2* 8.0* 8.8   GLC  --   --  146* 98 154*   ALBUMIN  --   --  1.6*  --  2.0*   PROTTOTAL  --   --   --   --  6.2*   BILITOTAL  --   --   --   --  0.8   ALKPHOS  --   --   --   --  93   ALT  --   --   --   --  15   AST  --   --   --   --  20   TROPI 0.567* 0.972* 0.470*  --   --        Imaging:   Recent Results (from the past 24 hour(s))   CT Chest/Abdomen/Pelvis w Contrast    Narrative    CT CHEST, ABDOMEN AND PELVIS WITH CONTRAST 9/17/2017 9:50 AM    HISTORY: Hypoxic. Tachycardia. Pleuritic chest pain. History of  bladder cancer.      TECHNIQUE: 73 mL Isovue 370. Axial images with coronal  reconstructions. Radiation dose for this scan was reduced using  automated exposure control, adjustment of the mA and/or kV according  to patient size, or iterative reconstruction technique.    COMPARISON: None.    FINDINGS:      Chest: The pulmonary arteries are well-opacified. No CT evidence for  acute pulmonary embolus. No convincing significant adenopathy. Marked  paucity of subcutaneous fat.  Extensive advanced bullous and  emphysematous changes in both lungs. Prominent dependent atelectasis  at the posterior right  "lung base.     There is an unusual appearance to the left lung. Extensive  opacification involving the majority of the left lower lobe and likely  portions of the posterior left upper lobe. The opacification is  predominantly dependent and fairly low-density. Scattered throughout  the opacified lung are numerous tiny rounded circumscribed air  collections. These likely represent numerous blebs or bullae within  the left lung surrounded by the opacified lung. This process has an  unusual flattened anterior surface superiorly, suggestive of an  air-fluid level, suggesting that the consolidation represents  edematous/\"wet lung\".     Abdomen/pelvis: Several liver cysts. There may be mild intrahepatic  biliary dilatation. Common bile duct is within normal limits for age.  Large right renal cyst. Small left renal cyst. Multiple gallstones.  The upper abdominal organs are otherwise within normal limits.    Infrarenal abdominal aortic aneurysm measuring 7 x 6 cm in the lower  abdomen small amount of peripheral atherosclerotic plaque. Large soft  plaque most prominent distally where the opacified lumen is 1.8 cm.  The aneurysm extends to the proximal aspect of the common iliac  arteries. 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 The left common iliac artery originates from  the lower aspect of the abdominal aortic aneurysm.    The urinary bladder is moderately distended. Along the posterior right  lateral wall there is a focal mass arising from the wall measuring 2.9  x 1.8 cm, suspicious for recurrence of the known bladder cancer. It  has some peripheral calcification. Bowel and mesentery grossly normal.      Impression    IMPRESSION:    1. Large area of consolidation in the left lung occupying a  significant portion of the dependent left hemithorax, fairly low  attenuation and likely edematous lung. Further clarification from the  patient's physician states that the patient has been " coughing up large  amounts of mucus. There could be a mucoid component.  2. Extensive advanced emphysematous and bullous changes in both lungs.  3. Gallstones.  4. 7 cm infrarenal abdominal aortic aneurysm with a large amount of  soft plaque causing significant luminal narrowing. 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  5. Suspicious for recurrence of the urinary bladder malignancy.  6. No CT evidence for acute pulmonary embolus.    ASH GALEAS MD       Echo:   1. Left ventricular systolic function is severely reduced with a visually  estimated LVEF of 30-35%. Normal cavity size.  2. There is global hypokinesis but the inferior and inferolateral walls are  almost akinetic.  3. Mildly dilated right ventricle with moderately reduced systolic function.  4. Trace mitral regurgitation. Mild tricuspid regurgitation.  5. The right ventricular systolic pressure is approximated at 29.2 mmHg plus  the right atrial pressure.  6. The aortic valve is likely bicuspid, without hemodynamically significant  stenosis or regurgitation. The ascending aorta was not imaged.

## 2017-09-18 NOTE — PLAN OF CARE
Problem: Goal Outcome Summary  Goal: Goal Outcome Summary  Outcome: No Change  A&O.  Ax2 with cares in bed.  Sometimes continent, other times continent of urine.  Too weak to move around much; cachetic.  Heels floated.  Blanchable redness to spine and coccyx.  DD2 with nectar thick liquids.  Poor food intake, but very good fluid intake.  Lung sounds variable, but mainly diminished.  Fair cough effort with occasional productive sputum.  VSS ex. O2.  Was on 5LPM via NC at start of shift.  RT changed to simple face mask with 100% FiO2 per patient request and placed at 10L.  Required 11L at one point.  Switched to high flow NC during supper and stabilized on 9-10L.  Tele: Sinus tach with PAC's.  Echo today showed EF of 30-35%.  PRN Nicorette gum x1.  Vascular surgery and cardiology consults completed following RRT on NOC.  Discharge pending.

## 2017-09-18 NOTE — PLAN OF CARE
Problem: Goal Outcome Summary  Goal: Goal Outcome Summary  Outcome: No Change  A&Ox2. Disoriented to time/place. VSS on 10L via high flow NC except tachycardic and soft BP at times. O2 sat 93%. Tele SR with PAC. C/o intermittent L ribcage pain with coughing, declined pain medication. LS diminished. Frequent, productive cough with yellow/brown sputum. Very poor appetite, but drinking fluids. DD2, nectar-thick liquid diet. Refusing turning/repositioning every 2 hours despite pressure injury risk education. Redness, blanchable to coccyx. Incontinent of bladder at times. Voiding via bedside urinal at times with adequate output. Was given IV lasix x 1 this AM. IV SL. PT/OT/Speech following. Psych following. Nursing will continue to monitor.

## 2017-09-18 NOTE — PLAN OF CARE
Problem: Goal Outcome Summary  Goal: Goal Outcome Summary     SLP - Pt with other care provider on two attempts for swallow f/u. Will check back later today as schedule permits otherwise tomorrow 9/19/17.

## 2017-09-18 NOTE — PROVIDER NOTIFICATION
MD Notification    Notified Person:  MD    Notified Persons Name: Satish Singer    Notification Date/Time: 9/18/17 4097    Notification Interaction:  Paged Physician    Purpose of Notification: Low SBP.  79/55 and 87/61.  Asymptomatic at this time.    Orders Received: Hold Coreg for SBP < 100.    Comments:  If symptomatic, call back for fluid bolus.

## 2017-09-19 NOTE — PLAN OF CARE
Problem: Goal Outcome Summary  Goal: Goal Outcome Summary  OT: Eval completed and treatment initiated. Pt lives in a house alone and reports independence with ADL/IADL's including driving, managing own meds, shopping, cooking, cleaning and laundry, etc. Pt admitted due to elevated troponin in setting of pneumonia, type 2 MI, CM with EF30-35%.  Discharge Planner OT   Patient plan for discharge: home, appeared to be open to TCU?  Current status: pt requires CGA for supine to sit, sit to stand and transfer to chair with initial encouragement and cues for tech with ww with CGA/MONIKA for safety. Pt requires CGA/MIN A for LE ADL's and gets SOB with min activity, cues for PLB with O2 sats 86% on 10 L O2 oxymizer and rebounded to 90% with increased time. Pt alert and oriented x4 and will continue to monitor cognition/safety with ADL's.   Barriers to return to prior living situation: decreased balance, overall strength, activity tolerance, decreased safety/cognition and lives alone.   Recommendations for discharge: TCU  Rationale for recommendations: daily therapy to increase ADL/IADL and functional mobility independence and safety to PLOF.        Entered by: Laurita Magaña 09/19/2017 8:41 AM

## 2017-09-19 NOTE — PLAN OF CARE
Problem: Goal Outcome Summary  Goal: Goal Outcome Summary  PT: Evaluation attempted. First attempt, pt working with nursing staff for cares and second attempt pt sleeping soundly and does not wake up to voice.

## 2017-09-19 NOTE — PLAN OF CARE
Problem: Goal Outcome Summary  Goal: Goal Outcome Summary  Outcome: No Change  Alert and oriented. BP in 90's/50's, asymptomatic. 9L HFNC. Denies pain. Turn and repo in bed. Voiding in urinal. Tolerating DD1 with nectar thick liquids. Tele SR with PAC. Blanchable spine and coccyx.

## 2017-09-19 NOTE — PROGRESS NOTES
"CLINICAL NUTRITION SERVICES - REASSESSMENT NOTE      Future/Additional Recommendations:   Consider supplemental TF if pt continues poor intake         EVALUATION OF PROGRESS TOWARD GOALS   Diet:  DD1, NT per SLP. Ordered supplements w/meals.  Intake:  Has been poor, 25-50%. Pt asleep, RN states his intake is \"not great\".      ASSESSED NUTRITION NEEDS - Dosing Weight 62.3 kg (pt's reported wt)  Estimated Energy Needs: 8491-4602 kcals (35-40 Kcal/Kg)  Justification: underweight  Estimated Protein Needs: 75-95 grams protein (1.2-1.5 g pro/Kg)  Justification: Repletion      NEW FINDINGS:   Pt has not been weighed, despite 2 orders for daily weight    Previous Goals:   Pt to consume 50% meals and 100% of the shakes being sent with meals  Evaluation: Not met    Previous Nutrition Diagnosis:   Predicted inadequate nutrient intake related to swallowing difficulty as evidenced by pt with muscle and fat wasting  Evaluation: No change      CURRENT NUTRITION DIAGNOSIS  Inadequate oral intake related to poor appetite and likely difficulty breathing/eating as evidenced by documented intake 25-50%    INTERVENTIONS  Recommendations / Nutrition Prescription  Diet per SLP  Consider supplemental TF if pt continues poor intake    Implementation  No further interventions at this time    Goals  Pt will consistently consume at least 50% of meals      MONITORING AND EVALUATION:  Progress towards goals will be monitored and evaluated per protocol and Practice Guidelines    Tami Rain RD  Pager [phone] (M-F)            [phone] (W/E & Hol)          "

## 2017-09-19 NOTE — PROGRESS NOTES
[facility]    Hospitalist Progress Note    Assessment & Plan   Bong Noguera is a 82 year old male with a past medical history of COPD and known bladder tumor who was admitted on 9/15/2017 for suspected LLL pneumonia. Patient came in complaining of increasing generalized weakness and was found to have an infiltrate on imaging in the ED.  His hospital course is complicated by a period of hypotension and respiratory distress on 9/17 that required a RRT.  It was felt to be due to recent diuresis and improved after IVF.       # Acute Hypoxic Respiratory Failure, Likely 2/2 Pneumonia  Patient continues to require significant O2 at 10 L. Patient may have a component of fluid overload as well given his poor EF seen on echo on 9/17 but his blood pressures have been tenuous, especially after given a dose of Lasix.  Patient was instructed to use BiPAP last night but refused to wear it.  - Appreciate RT helping with management. Importance of following RTs instructions and use of pulmonary toilet, nebulizers, IS again encouraged  - Hopeful down-titration of O2       # Septic Shock, Likely 2/2 LLL Pneumonia  Patients episodes of hypotension appear to be more related to diuresis now.  Will hold off on further diuresis at this time.         # Left Lower Lobe Pneumonia due to Unspecified Organism  Large infiltrate seen on CXR with an increasing white blood cell count.  Sputum cultures with mixed gram positive bacteria, suspect due to normal bartolo.  Procalcitonin was elevated  - Continue IV Ceftriaxone and PO Azithromycin  - Repeat CBC tomorrow morning     # Newly Found HFrEF  EF 30-35%.  Etiology unclear but I suspect likely ischemic given smoking history.  Unclear if there is a component of fluid overload.  Patient has crackles on exam but no JVD or edema and patient denies any orthopnea.   - Holding off on further diuresis at this time.   - Low dose Coreg ordered with hold parameters  - Encouraged BiPAP use    - Cardiology is following and appreciate their recommendations     # Elevated Troponin  I suspect this is related to NSTEMI type II given the recent period of hypotension.      # Bladder Tumor  Urology consulted and appreciate their recommendations.  At this time holding off on further evaluation and management until further improved      # Cachexia  Likely related to the patient's malignancy  - Nutrition consult has been placed and appreciate their recommendations      # Acute Kidney Injury, Resolved  Suspect pre-renal related to hypovolemia as it resolved with fluids.      # History of COPD   Do not believe patient is in exacerbation but if respiratory status does not improve may consider steroids  - Continue ICS    DVT Prophylaxis: Enoxaparin (Lovenox) SQ  Code Status: Full Code    Disposition: Too be determined.  Patient still quite ill and requiring significant supplemental oxygen.  Will also likely need TCU at minimum upon discharge but is still too ill to meet with PT/OT.    Mariano Singer, DO  Text Page (7am to 6pm)    Interval History   Patient seen and examined.  He believes he is feeling improved.  RT tried to get patient to wear BiPAP yesterday evening but he refused.  Discussed the importance of wearing BiPAP.     -Data reviewed today: I reviewed all new labs and imaging results over the last 24 hours. I personally reviewed no images or EKG's today.    Physical Exam   Temp: 97.4  F (36.3  C) Temp src: Oral BP: 101/65 Pulse: 100 Heart Rate: 95 Resp: 18 SpO2: 91 % O2 Device: High Flow Nasal Cannula (HFNC) (10 L) Oxygen Delivery: 10 LPM  There were no vitals filed for this visit.  Vital Signs with Ranges  Temp:  [97.4  F (36.3  C)-98.4  F (36.9  C)] 97.4  F (36.3  C)  Pulse:  [] 100  Heart Rate:  [] 95  Resp:  [18-24] 18  BP: ()/(52-74) 101/65  SpO2:  [91 %-96 %] 91 %  I/O last 3 completed shifts:  In: 480 [P.O.:480]  Out: 875 [Urine:875]    Constitutional: Awake, alert, cooperative,  no apparent distress  Respiratory: Coarse breath sounds throughout.  No respiratory distress   Cardiovascular: Tachycardiac, normal S1 and S2, and no murmur noted  GI: Normal bowel sounds, soft, non-distended, non-tender  Skin/Integumen: No rashes, no cyanosis, no edema    Medications     - MEDICATION INSTRUCTIONS -       - MEDICATION INSTRUCTIONS -         carvedilol  3.125 mg Oral BID w/meals     enoxaparin  40 mg Subcutaneous Q24H     sodium chloride (PF)  10 mL Intracatheter Once     aspirin EC  81 mg Oral Daily     atorvastatin  20 mg Oral Daily     fluticasone furoate  1 puff Inhalation Daily     tiotropium  18 mcg Inhalation Daily     tamsulosin  0.4 mg Oral Weekly     cefTRIAXone  2 g Intravenous Q24H     azithromycin  250 mg Oral Q24H       Data     Recent Labs  Lab 09/19/17  0913 09/19/17  0310 09/18/17  0845 09/17/17  1214 09/17/17  0617 09/17/17  0340 09/16/17  0750 09/15/17  2149   WBC  --   --   --   --   --  19.4*  --  15.7*   HGB  --   --   --   --   --  15.6  --  14.9   MCV  --   --   --   --   --  95  --  93     --   --   --   --  249  --  253   NA  --   --   --   --   --  139 139 136   POTASSIUM  --   --  3.5  --   --  3.4 3.9 3.9   CHLORIDE  --   --   --   --   --  107 104 100   CO2  --   --   --   --   --  24 27 23   BUN  --   --   --   --   --  50* 69* 85*   CR  --  0.66  --   --   --  0.94 1.36* 1.97*   ANIONGAP  --   --   --   --   --  8 8 13   LETHA  --   --   --   --   --  8.2* 8.0* 8.8   GLC  --   --   --   --   --  146* 98 154*   ALBUMIN  --   --   --   --   --  1.6*  --  2.0*   PROTTOTAL  --   --   --   --   --   --   --  6.2*   BILITOTAL  --   --   --   --   --   --   --  0.8   ALKPHOS  --   --   --   --   --   --   --  93   ALT  --   --   --   --   --   --   --  15   AST  --   --   --   --   --   --   --  20   TROPI  --   --   --  0.567* 0.972* 0.470*  --   --        Imaging:   No results found for this or any previous visit (from the past 24 hour(s)).

## 2017-09-19 NOTE — PROVIDER NOTIFICATION
MD Notification    Notified Person:  MD    Notified Persons Name: Dr. Singer    Notification Date/Time: 9/19/2017 at 1458    Notification Interaction:  Talked with Physician    Purpose of Notification: FYI, sputum culture results are finalized.    Orders Received:    Comments:

## 2017-09-19 NOTE — PLAN OF CARE
Problem: Goal Outcome Summary  Goal: Goal Outcome Summary  Outcome: No Change  A&O. VSS on 10L O2 via oxymizer. O2 sat 93-95%. Switched to 10L via oxymask as patient was having nasal dryness. C/o L ribcage pain with coughing only, rating pain 4/10. MD notified. Given PRN Tylenol and Robitussin x 1. Tele SR with PACs. LS diminished. Denies SOB. DD1, nectar-thick liquid diet. Incontinent of bladder at times, voids at times via bedside urinal. Redness, blanchable to coccyx; covered with mepilex, new dressing C/D/I. Abrasion to mid back. Turned/repositioned every 2 hours. Up to chair with assist of 2, gait belt, and walker. IV SL. Nursing will continue to monitor.

## 2017-09-19 NOTE — PROGRESS NOTES
[facility] Cardiology Progress Note       Assessment and Plan:   1.  Left sided pneumonia.  2.  Cardiomyopathy:  Moderate to severe LV systolic dysfunction.  Suspect underlying coronary artery disease.  3.  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  4.  Bladder cancer:  ?further work up  5.  Abdominal aortic aneurysm:  7 cm.  Appreciate vascular input.  6.  Renal failure:  Significantly improved.  7.  Cachexia:    Pt appears very debilitated.  Recommend medical management for cardiac condition.  BP soft, will keep on same dose of coreg for now.    Guarded prognosis.               Interval History:   Somnolent, no complaints.                  Medications:     Current Facility-Administered Medications   Medication     carvedilol (COREG) tablet 3.125 mg     No lozenges or gum should be given while patient on BIPAP/AVAPS/AVAPS AE     hypromellose-dextran (ARTIFICAL TEARS) ophthalmic solution 1 drop     Patient may continue current oral medications     ipratropium - albuterol 0.5 mg/2.5 mg/3 mL (DUONEB) neb solution 3 mL     enoxaparin (LOVENOX) injection 40 mg     sodium chloride (PF) 0.9% PF flush 10 mL     sodium chloride (OCEAN) 0.65 % nasal spray 1-2 spray     aspirin EC EC tablet 81 mg     atorvastatin (LIPITOR) tablet 20 mg     fluticasone (FLONASE) 50 MCG/ACT spray 1 spray     pseudoePHEDrine (SUDAFED) tablet 30 mg     fluticasone furoate (ARNUITY ELLIPTA) 100 MCG/ACT inhalation powder 1 puff     tiotropium (SPIRIVA) capsule 18 mcg     tamsulosin (FLOMAX) capsule 0.4 mg     potassium chloride SA (K-DUR/KLOR-CON M) CR tablet 20-40 mEq     potassium chloride (KLOR-CON) Packet 20-40 mEq     potassium chloride 10 mEq in 100 mL intermittent infusion     potassium chloride 10 mEq in 100 mL intermittent infusion with 10 mg lidocaine     potassium chloride 20 mEq in 50 mL intermittent infusion     acetaminophen (TYLENOL) tablet 650 mg     naloxone (NARCAN) injection 0.1-0.4 mg     melatonin tablet 1  "mg     senna-docusate (SENOKOT-S;PERICOLACE) 8.6-50 MG per tablet 1-2 tablet     polyethylene glycol (MIRALAX/GLYCOLAX) Packet 17 g     bisacodyl (DULCOLAX) Suppository 10 mg     ondansetron (ZOFRAN-ODT) ODT tab 4 mg    Or     ondansetron (ZOFRAN) injection 4 mg     cefTRIAXone (ROCEPHIN) 2 g vial to attach to  ml bag for ADULTS or NS 50 ml bag for PEDS     azithromycin (ZITHROMAX) tablet 250 mg     guaiFENesin (ROBITUSSIN) 20 mg/mL solution 10 mL     nicotine polacrilex (NICORETTE) gum 2 mg             Physical Exam:   Blood pressure 101/65, pulse 100, temperature 97.4  F (36.3  C), temperature source Oral, resp. rate 18, SpO2 91 %.  Wt Readings from Last 4 Encounters:   08/10/17 64 kg (141 lb)   17 63.5 kg (140 lb)   17 65.4 kg (144 lb 3.2 oz)   16 61.2 kg (135 lb)         Vital Sign Ranges  Temperature Temp  Av.1  F (36.7  C)  Min: 97.4  F (36.3  C)  Max: 98.4  F (36.9  C)   Blood pressure Systolic (24hrs), Av , Min:79 , Max:111        Diastolic (24hrs), Av, Min:52, Max:74      Pulse Pulse  Av.5  Min: 95  Max: 100   Respirations Resp  Av.2  Min: 18  Max: 24   Pulse oximetry SpO2  Av.5 %  Min: 91 %  Max: 96 %         Intake/Output Summary (Last 24 hours) at 17 1106  Last data filed at 17 0800   Gross per 24 hour   Intake              480 ml   Output              900 ml   Net             -420 ml          Cardiovascular:   Normal apical impulse, regular rate and rhythm, normal S1 and S2, no S3 or S4, and no murmur noted   Chest clear.  No peripheral oedema, but \"dusky\" lower limbs.         Data:     Labs:  Lab Results   Component Value Date     2017    Lab Results   Component Value Date    CHLORIDE 107 2017    Lab Results   Component Value Date    BUN 50 2017      Lab Results   Component Value Date    POTASSIUM 3.5 2017    Lab Results   Component Value Date    CO2 24 2017    Lab Results   Component Value Date    CR " 0.66 09/19/2017        Lab Results   Component Value Date    WBC 19.4 (H) 09/17/2017    HGB 15.6 09/17/2017    HCT 45.7 09/17/2017    MCV 95 09/17/2017     09/19/2017     Lab Results   Component Value Date    TROPI 0.567 (HH) 09/17/2017           Attestation:  I have reviewed today's vital signs, notes, medications, labs and imaging.         DR DANNY CALIXTO MD 9/19/2017  11:06 AM

## 2017-09-19 NOTE — PROGRESS NOTES
09/19/17 0748   Quick Adds   Type of Visit Initial Occupational Therapy Evaluation   Living Environment   Lives With alone   Living Arrangements house   Home Accessibility stairs to enter home;stairs within home;tub/shower is not walk in;grab bars present (bathtub);grab bars present (toilet)   Number of Stairs to Enter Home 6   Number of Stairs Within Home 12  (basement)   Transportation Available car   Self-Care   Dominant Hand right   Current Activity Tolerance poor   Regular Exercise yes   Activity/Exercise Type walking   Exercise Amount/Frequency daily   Equipment Currently Used at Home cane, straight;grab bar   Activity/Exercise/Self-Care Comment reports uses a cane when going out.    Functional Level Prior   Ambulation 1-->assistive equipment   Transferring 0-->independent   Toileting 1-->assistive equipment   Bathing 1-->assistive equipment   Dressing 0-->independent   Eating 0-->independent   Communication 0-->understands/communicates without difficulty   Swallowing 2-->difficulty swallowing liquids   Fall history within last six months no   Prior Functional Level Comment Pt reports independence with ADl/IADL's drives, manages own meds, cooking, cleaning and laundry.   General Information   Onset of Illness/Injury or Date of Surgery - Date 09/15/17   Referring Physician Joseph Salvador MD   Patient/Family Goals Statement appears open to TCU   Additional Occupational Profile Info/Pertinent History of Current Problem Bong Noguera is a 82 year old male with a past medical history of COPD and known bladder tumor who was admitted on 9/15/2017 for suspected LLL pneumonia. Patient came in complaining of increasing generalized weakness and was found to have an infiltrate on imaging in the ED.  His hospital course is complicated by a period of hypotension and respiratory distress on 9/17 that required a RRT.  It was felt to be due to recent diuresis and improved after IVF.    Precautions/Limitations  fall precautions   Cognitive Status Examination   Orientation orientation to person, place and time   Level of Consciousness alert   Able to Follow Commands WNL/WFL   Personal Safety (Cognitive) moderate impairment   Cognitive Comment Will continue to monitor cognition with ADLs   Visual Perception   Visual Perception Wears glasses   Sensory Examination   Sensory Quick Adds No deficits were identified   Pain Assessment   Patient Currently in Pain No   Range of Motion (ROM)   ROM Comment B UE AROM WFL   Strength   Strength Comments B UE MMT 4/5   Bed Mobility Skill: Scooting/Bridging   Level of [place]: Scooting/Bridging contact guard   Physical Assist/Nonphysical Assist: Scooting/Bridging 1 person assist;nonverbal cues (demo/gestures)   Bed Mobility Skill: Supine to Sit   Level of [place]: Supine/Sit contact guard   Physical Assist/Nonphysical Assist: Supine/Sit 1 person assist;verbal cues   Transfer Skill: Bed to Chair/Chair to Bed   Level of [place]: Bed to Chair minimum assist (75% patients effort)   Physical Assist/Nonphysical Assist: Bed to Chair 1 person assist;verbal cues   Assistive Device - Transfer Skill Bed to Chair Chair to Bed Rehab Eval standard walker   Transfer Skill: Sit to Stand   Level of [place]: Sit/Stand minimum assist (75% patients effort)   Physical Assist/Nonphysical Assist: Sit/Stand 1 person assist;verbal cues   Assistive Device for Transfer: Sit/Stand standard walker   Lower Body Dressing   Level of [place]: Dress Lower Body minimum assist (75% patients effort)   Physical Assist/Nonphysical Assist: Dress Lower Body 1 person assist;verbal cues   Eating/Self Feeding   Physical Assist/Nonphysical Assist: Eating set-up required;1 person assist   Instrumental Activities of Daily Living (IADL)   Previous Responsibilities meal prep;housekeeping;laundry;shopping;yardwork;medication management;finances;driving   Activities of Daily Living Analysis   Impairments  "Contributing to Impaired Activities of Daily Living balance impaired;cognition impaired;strength decreased  (decreased activity tolerance)   General Therapy Interventions   Planned Therapy Interventions ADL retraining;cognition;transfer training;progressive activity/exercise   Clinical Impression   Criteria for Skilled Therapeutic Interventions Met yes, treatment indicated   OT Diagnosis decreased ADL/IADL's and functional mobility   Influenced by the following impairments impaired balance, overall strength, activity tolerance, and safety/cognition   Assessment of Occupational Performance 3-5 Performance Deficits   Identified Performance Deficits impaired independence and safety with basic ADl/IADl's ie dressing, toilet and tub transfer, driving, med mgmt, etc.    Clinical Decision Making (Complexity) Moderate complexity   Therapy Frequency daily   Predicted Duration of Therapy Intervention (days/wks) 5 days   Anticipated Discharge Disposition Transitional Care Facility   Risks and Benefits of Treatment have been explained. Yes   Patient, Family & other staff in agreement with plan of care Yes   [facility] AM-PAC  \"6 Clicks\" Daily Activity Inpatient Short Form   1. Putting on and taking off regular lower body clothing? 3 - A Little   2. Bathing (including washing, rinsing, drying)? 3 - A Little   3. Toileting, which includes using toilet, bedpan or urinal? 3 - A Little   4. Putting on and taking off regular upper body clothing? 3 - A Little   5. Taking care of personal grooming such as brushing teeth? 3 - A Little   6. Eating meals? 3 - A Little   Daily Activity Raw Score (Score out of 24.Lower scores equate to lower levels of function) 18   Total Evaluation Time   Total Evaluation Time (Minutes) 10     "

## 2017-09-19 NOTE — PLAN OF CARE
Problem: Goal Outcome Summary  Goal: Goal Outcome Summary  SLP: Attempted to see patient for swallow treatment. He opened his eyes briefly and fell back to sleep.

## 2017-09-19 NOTE — PLAN OF CARE
Problem: Goal Outcome Summary  Goal: Goal Outcome Summary  Outcome: No Change  [id number]: A&O.  Bedrest this shift; too weak to move around much.  Diet downgraded to DD1 with nectar thick fluids, but has no interest in eating.  Pressure relief education reinforced.  Blanchable to spine and coccyx.  Cont pulse ox with O2 in 90's on 9 LPM via HFNC.  Tele: SR with PAC's.  Hypotensive.  MD aware.  Monitor for changes in perfusion.  Discharge pending.

## 2017-09-20 NOTE — PLAN OF CARE
Problem: Goal Outcome Summary  Goal: Goal Outcome Summary  Outcome: No Change  A/o x4. [place]. VSS. 60% Aerosol Mask.Mid to low 90's LS coarse, cough productive frequent. Denies pain. DD1 [place] diet. Minimal intake. IV SL. Non blanchable coccyx, foam in place, turn repo q2 hours. WOC consulted. Two abrasions on upper back covered with foam CDI. BLE alec, dusky. Pedal pulses weak. Up A/2/walker gait belt to bathroom for Large BM, using urinal too. D/c pending recovery.

## 2017-09-20 NOTE — PLAN OF CARE
Problem: Goal Outcome Summary  Goal: Goal Outcome Summary  Outcome: No Change  A&Ox4, confused at times, speech garbled. VSS; 10L aerosol mask 70% humidity, sat 95%, SOB, LS coarse; frequent productive cough. NPO. Denies pain. Tele NSR with PAC. Sepsis protocol fired this am, Lactic acid 0.9. Up with 1 assist. Dressing on back & coccyx CDI. Refused turn/repo today. BLE alec. Orders to transfer to [facility] when bed available. Continue to monitor closely.

## 2017-09-20 NOTE — PROGRESS NOTES
Patient on 70% cool aerosol mask today.  SpO2 low to mid 90's.  Lung sounds diminished.  Productive cough.  Bronchodilator treatments ordered.  Will continue to follow.

## 2017-09-20 NOTE — PLAN OF CARE
Problem: Goal Outcome Summary  Goal: Goal Outcome Summary  Outcome: No Change  Alert & oriented with forgetfulness, up with 2 assists/walker, denies pain and nausea, LS diminished, SOB, [place], 60% aerosol mask, saturation of 93-94%, soft BP 98/64, all other VSS, tele NSR with PAC,frequent thick yellow productive cough, incontinence, uses urinal at times, blanchable redness on coccyx, turn & reposition, mipelex dressing on mid back, lower extremities alec and dusky, will continue to monitor.

## 2017-09-20 NOTE — PLAN OF CARE
Problem: Goal Outcome Summary  Goal: Goal Outcome Summary  Discharge Planner SLP   Patient plan for discharge: Not able to state.   Current status: Severe oral and pharyngeal dysphagia at bedside. Patient with overt Sx of aspiration with nectar thick liquids, not consuming the pureed solids. Respiratory status is poor and on face mask at 70% and should not be eating/drinking. Unable to maintain adequate saturation levels. Patient provided education at bedside and is in agreement with rationale for NPO status. Note that patient is full code. Recommend: 1. NPO and consider alternate means of nutrition or consider a comfort care approach. He would benefit from a palliative care consult. 2. SLP will f/u on patient's status 9/21/17.   Barriers to return to prior living situation: Deconditioning and dysphagia  Recommendations for discharge: LTC  Rationale for recommendations: LTC pending progress ST at discharge.        Entered by: Amy Olson 09/20/2017 12:21 PM

## 2017-09-20 NOTE — PROGRESS NOTES
[facility]    Hospitalist Progress Note    Date of Service (when I saw the patient): 09/20/2017    Assessment & Plan   Mr. 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    1) Acute hypoxemic respiratory failure and severe sepsis due to left lower lobe aspiration pneumonia: Mr. Noguera appears markedly frail and cachectic though he denies recent weight loss to me (see H and P for documentation in fact of over 20 pound weight loss since 2014). Reportedly a papillary bladder mass had been diagnosed in 2016 but he was unable to make appointment for TURBT. On this admission, he presents for progressive weakness, dyspnea and cough. He was in marked respiratory distress at admission with hypotension, and had leukocytosis, elevated Lactate, and elevated Pro-calcitonin with large left lower lobe infiltrate. UA was essentially negative. He was started on Ceftriaxone and Azithromycin.     Today he has ongoing respiratory distress and is on 10 liters oxygen; blood pressures remain  systolic. He has not tolerated a BIPAP trial. WBC risen further today to 25 k.    He does not seem to be improving and may have ongoing severe sepsis triggering COPD exacerbation and ARDS    -- He has completed 5 days of Azithromycin; will broaden his antibiotics today from Ceftriaxone to Zosyn to cover for possible pseudomonas at which he may be at increased risk    -- Sputum cultures reveal only Candida; this was discussed with ID and that is felt to be a contaminant    -- Will check blood cultures today    -- he has failed his swallow study today and for now is NPO    -- Will continue inhaled steroid and Spiriva; will add standing nebulizers and systemic IV steroids 125 mg IV Methylprednisolone  today    -- Will transfer to [facility] today. Low threshold for ICU transfer, intubation, and pressor support    -- he has severe hypoalbuminemia and we will consult Nutrition here. I have had care goal discussions with him today and he wishes to remain full code despite advice that if intubated he may never come off the ventilator; I will continue to have ongoing care discussions with him every day    2) NSTEMI and acute systolic CHF exacerbation: Cardiology consulted. Likely due to demand ischemia from sepsis as well as suspected underlying undiagnosed CAD. He has had leg swelling and orthopnea here with elevated Troponin. TTE showed EF 30-35% with inferior and inferoapical akinesis.     -- He has been on carvedilol which is continued.    -- Diuresis has exacerbated his hypotension wo this has been stopped. If hypotension worsens he will need inotropic support    3) ROBINSON: ATN due to sepsis suspected; improving, monitor daily    4) Suspected Bladder Cancer: Urology consulted, guidance appreciated; further evaluation will need to be as an outpatient if he can recover from this illness    5) 7 cm AAA: Seen incidentally on imaging; vascular Medicine consulted. May need outpatient repair.    DVT Prophylaxis: Enoxaparin  Code Status: Full Code    Disposition: Expected discharge in several days    Jag Linda MD    Interval History   Says he feels better but breathing remains labored and he continues to cough up a lot of yellow sputum. He denies chest pain but does endorse odynophagia. We discussed goals of care and he is adamant that he remain full code.    -Data reviewed today: I reviewed all new labs and imaging results over the last 24 hours. I personally reviewed no images or EKG's today.    Physical Exam   Temp: 97.5  F (36.4  C) Temp src: Oral BP: 97/67 Pulse: 95 Heart Rate: 86 Resp: 22 SpO2: 96 % O2 Device: Aerosol mask Oxygen Delivery: 10 LPM  There were no vitals filed for this visit.  Vital Signs with  Ranges  Temp:  [97.2  F (36.2  C)-98.7  F (37.1  C)] 97.5  F (36.4  C)  Pulse:  [] 95  Heart Rate:  [86-98] 86  Resp:  [20-24] 22  BP: ()/(48-69) 97/67  FiO2 (%):  [60 %] 60 %  SpO2:  [93 %-96 %] 96 %  I/O last 3 completed shifts:  In: 240 [P.O.:240]  Out: 645 [Urine:645]    Constitutional: Alert and oriented to person, place and time; markedly labored breathing; extremely cachectic and frail  HEENT: normocephalic dry mucus membranes  Respiratory: lungs very diminished to auscultation bilaterally  Cardiovascular: regular S1 S2   GI: abdomen soft non tender non distended bowel sounds positive  Skin: no rash, good turgor  Musculoskeletal: no clubbing, cyanosis or edema  Neuro: EOMI; moves all four extremities  Psych: Appropriate affect, speech non-tangential      Medications     - MEDICATION INSTRUCTIONS -       - MEDICATION INSTRUCTIONS -         piperacillin-tazobactam  3.375 g Intravenous Q6H     sodium chloride (PF)  3 mL Intracatheter Q8H     carvedilol  3.125 mg Oral BID w/meals     enoxaparin  40 mg Subcutaneous Q24H     sodium chloride (PF)  10 mL Intracatheter Once     aspirin EC  81 mg Oral Daily     atorvastatin  20 mg Oral Daily     fluticasone furoate  1 puff Inhalation Daily     tiotropium  18 mcg Inhalation Daily     tamsulosin  0.4 mg Oral Weekly       Data     Recent Labs  Lab 09/20/17  0845 09/19/17  0913 09/19/17  0310 09/18/17  0845 09/17/17  1214 09/17/17  0617 09/17/17  0340 09/16/17  0750 09/15/17  2149   WBC 24.7*  --   --   --   --   --  19.4*  --  15.7*   HGB 13.6  --   --   --   --   --  15.6  --  14.9   MCV 97  --   --   --   --   --  95  --  93    259  --   --   --   --  249  --  253   NA  --   --   --   --   --   --  139 139 136   POTASSIUM  --   --   --  3.5  --   --  3.4 3.9 3.9   CHLORIDE  --   --   --   --   --   --  107 104 100   CO2  --   --   --   --   --   --  24 27 23   BUN  --   --   --   --   --   --  50* 69* 85*   CR  --   --  0.66  --   --   --  0.94 1.36*  1.97*   ANIONGAP  --   --   --   --   --   --  8 8 13   LETHA  --   --   --   --   --   --  8.2* 8.0* 8.8   GLC  --   --   --   --   --   --  146* 98 154*   ALBUMIN  --   --   --   --   --   --  1.6*  --  2.0*   PROTTOTAL  --   --   --   --   --   --   --   --  6.2*   BILITOTAL  --   --   --   --   --   --   --   --  0.8   ALKPHOS  --   --   --   --   --   --   --   --  93   ALT  --   --   --   --   --   --   --   --  15   AST  --   --   --   --   --   --   --   --  20   TROPI  --   --   --   --  0.567* 0.972* 0.470*  --   --        No results found for this or any previous visit (from the past 24 hour(s)).

## 2017-09-20 NOTE — PROGRESS NOTES
[facility] Nurse Inpatient Adult Pressure Injury (PI) Assessment     Initial Assessment of PI(s) on pt's:   Coccyx    Data:   Patient History:      per MD note(s): Bong Noguera is a 82 year old male with a past medical history of COPD and known bladder tumor who was admitted on 9/15/2017 for suspected LLL pneumonia. Patient came in complaining of increasing generalized weakness and was found to have an infiltrate on imaging in the ED.  His hospital course is complicated by a period of hypotension and respiratory distress on  that required a RRT.  It was felt to be due to recent diuresis and improved after IVF.    Current mattress:  AtmosAir  Current pressure relieving devices:  Pillows and foam dressing    Moisture Management:  Diaper    Catheter secured? Not applicable    Current Diet / Nutrition:           Active Diet Order      Combination Diet Dysphagia Diet Level 1: Pureed; Nectar Thickened Liquids (pre-thickened or use instant food thickener)    Mehrdad Assessment and sub scores:   Mehrdad Score  Av  Min: 13  Max: 21   Labs:   Recent Labs   Lab Test  17   0340   16   1215   ALBUMIN  1.6*   < >   --    HGB  15.6   < >   --    WBC  19.4*   < >   --    A1C   --    --   5.8*    < > = values in this interval not displayed.                                                                                                                          Pressure Injury Assessment  (location):   Coccyx    Wound History:   Pt has pneumonia with low oxygen saturation and low BP. Pt is also weak and unable to reposition himself effectively as well as is incontinent of bowel and bladder.   Wound Base: Epidermis intact with patches of blanchable redness on BL sides of coccyx. Left of coccyx approx 5cm x 3cm and Right of coccyx 3cm x 3cm area. Skin is dry and thin over coccyx with no open areas and pt denies pain with assessment.      Patient's chart evaluated.      Mehrdad Interventions:   "Current Mehrdad Interventions and Care Plan reviewed and updated, appropriate at this time.    Orders  Written    Supplies  Reviewed    Discussed plan of care with Patient and Nurse           Assessment:     Skin assessed with bedside nurse today, pt able to assist with rolling on his side, but is unable to position self dt weakness. Pt positioned on his side and mepilex lifted for assessment. Pt has 2 blanchable reddened areas and currently has no pressure injury, but is at high risk for Pressure injury dt lack of independent positioning, poor oxygenation, low BP, and incontinence. Pt requires frequent position changes and assessment of area to prevent PI, will perform strict PIP measures and apply mepilex to protect.          Plan:     Nursing to notify the Provider(s) and re-consult the [facility] Nurse if wound(s) deteriorate(s)or if the wound care plan needs reevaluation.    If pt is refusing to turn or reposition they must be educated on the  potential injury from not off loading pressure.  Then this \"educated refusal\" needs to be documented as an \"educated refusal to turn/ reposition\" and document if alert, etc.    Plan for wound care to wound located on Coccyx:   1. Clean intact skin with gentle soap and water, dry and dry again.  2. Paint with No Sting Skin Prep (#[id number]) and allow to dry thoroughly  3. Press a Mepilex  Sacral Dressing (PS#[id number])  to the area, making sure to conform nicely to skin curvatures  4. Time and date dressing change and bartolo with a \"P\" for prevention.  5. Reposition pt every 1 to 2 hours when in bed and hourly when up to the chair to relieve pressure and promote perfusion to tissue    NOTE** make sure to continue to assess under the Mepilex Dressing BID and document findings.  If epidermis  opens notify CWOCN's and change dressing to \"T\" for treatment    WO Nurse will return: weekly and PRN  Face to face time: 20 Minutes    "

## 2017-09-20 NOTE — PLAN OF CARE
Problem: Goal Outcome Summary  Goal: Goal Outcome Summary  Physical Therapy: Per discussion with RN, patient working hard to maintain O2 sats while on 10L O2 and patient will be transferring to [facility]. Will hold PT eval this date.

## 2017-09-20 NOTE — PROGRESS NOTES
[facility] Cardiology Progress Note       Assessment and Plan:   1.  Left sided pneumonia.  2.  Cardiomyopathy:  Moderate to severe LV systolic dysfunction.  -Suspect underlying coronary artery disease.  -BB, statin, ASA  3.  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 Trop peak 0.972  4.  Bladder cancer:  Per IM, was suppose to have resection over a year ago.  5.  Abdominal aortic aneurysm:  7 cm.  Appreciate vascular input.  6.  Renal failure:  Significantly improved.  7.  Cachexia:    Pt appears very debilitated.    Guarded prognosis.  Continue medical management. Soft BPs, continue current dose of coreg  Cardiology will s/o for now, appreciate consult.    Pt should follow up with Dr. Dean in the outpatient setting when able.            Interval History:   Somnolent, on 10 L of O2. Sinus on tele. Appears comfortable.                  Medications:     Current Facility-Administered Medications   Medication     carvedilol (COREG) tablet 3.125 mg     No lozenges or gum should be given while patient on BIPAP/AVAPS/AVAPS AE     hypromellose-dextran (ARTIFICAL TEARS) ophthalmic solution 1 drop     Patient may continue current oral medications     ipratropium - albuterol 0.5 mg/2.5 mg/3 mL (DUONEB) neb solution 3 mL     enoxaparin (LOVENOX) injection 40 mg     sodium chloride (PF) 0.9% PF flush 10 mL     sodium chloride (OCEAN) 0.65 % nasal spray 1-2 spray     aspirin EC EC tablet 81 mg     atorvastatin (LIPITOR) tablet 20 mg     fluticasone (FLONASE) 50 MCG/ACT spray 1 spray     pseudoePHEDrine (SUDAFED) tablet 30 mg     fluticasone furoate (ARNUITY ELLIPTA) 100 MCG/ACT inhalation powder 1 puff     tiotropium (SPIRIVA) capsule 18 mcg     tamsulosin (FLOMAX) capsule 0.4 mg     potassium chloride SA (K-DUR/KLOR-CON M) CR tablet 20-40 mEq     potassium chloride (KLOR-CON) Packet 20-40 mEq     potassium chloride 10 mEq in 100 mL intermittent infusion     potassium chloride 10 mEq in 100 mL intermittent  "infusion with 10 mg lidocaine     potassium chloride 20 mEq in 50 mL intermittent infusion     acetaminophen (TYLENOL) tablet 650 mg     naloxone (NARCAN) injection 0.1-0.4 mg     melatonin tablet 1 mg     senna-docusate (SENOKOT-S;PERICOLACE) 8.6-50 MG per tablet 1-2 tablet     polyethylene glycol (MIRALAX/GLYCOLAX) Packet 17 g     bisacodyl (DULCOLAX) Suppository 10 mg     ondansetron (ZOFRAN-ODT) ODT tab 4 mg    Or     ondansetron (ZOFRAN) injection 4 mg     cefTRIAXone (ROCEPHIN) 2 g vial to attach to  ml bag for ADULTS or NS 50 ml bag for PEDS     guaiFENesin (ROBITUSSIN) 20 mg/mL solution 10 mL     nicotine polacrilex (NICORETTE) gum 2 mg             Physical Exam:   Blood pressure 93/69, pulse 95, temperature 98.7  F (37.1  C), temperature source Oral, resp. rate 24, SpO2 93 %.  Wt Readings from Last 4 Encounters:   08/10/17 64 kg (141 lb)   17 63.5 kg (140 lb)   17 65.4 kg (144 lb 3.2 oz)   16 61.2 kg (135 lb)         Vital Sign Ranges  Temperature Temp  Av.1  F (36.7  C)  Min: 97.4  F (36.3  C)  Max: 98.4  F (36.9  C)   Blood pressure Systolic (24hrs), Av , Min:79 , Max:111        Diastolic (24hrs), Av, Min:52, Max:74      Pulse Pulse  Av.5  Min: 95  Max: 100   Respirations Resp  Av.2  Min: 18  Max: 24   Pulse oximetry SpO2  Av.5 %  Min: 91 %  Max: 96 %         Intake/Output Summary (Last 24 hours) at 17 1106  Last data filed at 17 0800   Gross per 24 hour   Intake              480 ml   Output              900 ml   Net             -420 ml          Cardiovascular:   Normal apical impulse, regular rate and rhythm, normal S1 and S2, no S3 or S4, and no murmur noted   LS coarse  No peripheral edema, but \"dusky\" upper  limbs.         Data:     Labs:  Lab Results   Component Value Date     2017    Lab Results   Component Value Date    CHLORIDE 107 2017    Lab Results   Component Value Date    BUN 50 2017      Lab Results "   Component Value Date    POTASSIUM 3.5 09/18/2017    Lab Results   Component Value Date    CO2 24 09/17/2017    Lab Results   Component Value Date    CR 0.66 09/19/2017        Lab Results   Component Value Date    WBC 24.7 (H) 09/20/2017    HGB 13.6 09/20/2017    HCT 41.0 09/20/2017    MCV 97 09/20/2017     09/20/2017     Lab Results   Component Value Date    TROPI 0.567 (HH) 09/17/2017           Attestation:  I have reviewed today's vital signs, notes, medications, labs and imaging.         Mia Villatoro, ROMANA CNP 9/19/2017  11:06 AM

## 2017-09-20 NOTE — PLAN OF CARE
Problem: Goal Outcome Summary  Goal: Goal Outcome Summary  OT: Pt declined OT attempt to engage in OT session

## 2017-09-21 NOTE — PROGRESS NOTES
"SPIRITUAL HEALTH SERVICES Progress Note  [facility]    Initial visit per LOS.  Pt strongly declined offer of a SH visit and referred to a  visit in another care setting that was not respectful of pt's \"non-Jainism\" orientation.    Pt states approval of SH plan to note in the chart that PT DOES NOT WANT  VISITS!                                                                                                                                           Sawyer Pfeiffer M.Div., [facility]  Staff   Pager [phone]    "

## 2017-09-21 NOTE — PROGRESS NOTES
Afternoon rounds; sleeping comfortably on 2-4 L NC. Will transfer back to the floor today and plan tomorrow for further discussions regarding possibly starting tube feeds.    Jag Linda

## 2017-09-21 NOTE — PLAN OF CARE
Problem: Goal Outcome Summary  Goal: Goal Outcome Summary  VSS. Tele NSR.SaO2 98% on 10LPM, LS diminished, frequent productive cough. NPO. Denies pain. Dressing on back & coccyx CDI. Good UO. BLE alec. Continue to monitor Resp status closely.

## 2017-09-21 NOTE — PLAN OF CARE
Problem: Goal Outcome Summary  Goal: Goal Outcome Summary  Outcome: Declining  OrientedX3, lethargic and arouses to voice. Weak, assist of 1-2. BP soft, stable. Afebrile. Tele NSR with PAC.10L aerosol mask 70% humidity, sat 93%, SOB/labored breathing, LS diminished; frequent productive cough. NPO, per SPL. Denies pain. K3.2, replacing. Started on solumedrol and continues on IV abx and nebs. Dressing on back & coccyx CDI. Refused turn/repo today. Uses urinal, likes to keep it  in between legs. BLE alec. Orders to transfer to [facility]. Report called to CICU and pt transferred with belongings.

## 2017-09-21 NOTE — PROGRESS NOTES
CLINICAL NUTRITION SERVICES - REASSESSMENT NOTE      Future/Additional Recommendations:   Start Nutrition support.     EVALUATION OF PROGRESS TOWARD GOALS   Diet:  NPO  Intake:  Pt has been NPO since 9/20. Ordered one meal on 9/19 and ate 25% of it per RN flow sheet.     NEW FINDINGS:     Previous Goals:   Pt will consistently consume at least 50% of meals    Evaluation: Not met    Previous Nutrition Diagnosis:   Inadequate oral intake related to poor appetite and likely difficulty breathing/eating as evidenced by documented intake 25-50%    Evaluation: Declining      CURRENT NUTRITION DIAGNOSIS  Inadequate oral intake related to poor appetite and NPO status as evidenced by pt ordering only one meal in the past 2 days and consuming 25% of it.    INTERVENTIONS  Recommendations / Nutrition Prescription  Recommend starting nutrition support.     Ewelina Ireland  Dietetic Intern

## 2017-09-21 NOTE — PLAN OF CARE
"Problem: Goal Outcome Summary  Goal: Goal Outcome Summary     SLP - Respiratory status improved - NC @ 4LPM.      Discharge Planner SLP   Patient plan for discharge: Not stated  Current status: Oral cavity clean, dry. Pt c/o thirst but not hunger. Tsp nectar-thick liquids via spoon - considerable swallow delay, weak laryngeal elevation, suspect pharyngeal residue with + s/sx of aspiration on 4th of 4 trials. Weak cough. Recommend - 1. Cont NPO 2. Consider alternate nutrition/hydration (brief discussion with pt \"Do the one in the nose\") - anticipate slow progress for PO tolerance given severity of deficits 3. SLP will cont  Barriers to return to prior living situation: Medical status, NPO  Recommendations for discharge: TCU with ongoing SLP for dysphagia  Rationale for recommendations: Severe dysphagia       Entered by: Ned Koehler 09/21/2017 12:35 PM             "

## 2017-09-21 NOTE — PROGRESS NOTES
09/21/17 1100   Quick Adds   Type of Visit Initial PT Evaluation   Living Environment   Lives With alone   Living Arrangements house   Home Accessibility bed and bath on same level   Number of Stairs to Enter Home 3   Number of Stairs Within Home 0   Stair Railings at Home outside, present on left side   Transportation Available none   Living Environment Comment Noted per prior attempt of PT eval, pt reported 6 stairs to enter home and 12 stairs within the home. Inconsistencies noted.    Self-Care   Usual Activity Tolerance moderate   Current Activity Tolerance poor   Regular Exercise yes   Activity/Exercise Type walking   Equipment Currently Used at Home cane, straight  (Uses for longer distances, uses walls within home)   Functional Level Prior   Ambulation 1-->assistive equipment   Transferring 0-->independent   Toileting 0-->independent   Bathing 0-->independent   Dressing 0-->independent   Fall history within last six months no   Which of the above functional risks had a recent onset or change? ambulation;transferring;toileting;bathing   General Information   Onset of Illness/Injury or Date of Surgery - Date 09/15/17   Referring Physician Jag Linda MD   Patient/Family Goals Statement None stated   Pertinent History of Current Problem (include personal factors and/or comorbidities that impact the POC) Patient is 82 year old male admitted with acute hypoxic respiratory failure with sepsis secondary to pneumonia and acute renal failure. Pt also had NSTEMI and acute systolic CHF. PMH includes COPD and bladder tumor.    Precautions/Limitations fall precautions   General Observations Pt in supine upon arrival of therapist.    General Info Comments Up with assist   Cognitive Status Examination   Orientation person   Level of Consciousness agitated   Follows Commands and Answers Questions 100% of the time   Personal Safety and Judgment intact   Integumentary/Edema   Integumentary/Edema Comments Noted  "alec B LEs below knees.    Posture    Posture Forward head position;Protracted shoulders;Kyphosis   Range of Motion (ROM)   ROM Comment B LEs grossly WFL   Strength   Strength Comments B LEs grossly at least 3+/5   Bed Mobility   Bed Mobility Comments Patient completed supine <> sit with bed rail assist and CGA. Cues needed for sequencing.    Transfer Skills   Transfer Comments NT.    Gait   Gait Comments NT.    Balance   Balance Comments Fair static sitting balance without UE support. Needed CGA to maintain balance. Noted slight posterior loss of balance.    Sensory Examination   Sensory Perception Comments Denies numbness/tingling   General Therapy Interventions   Planned Therapy Interventions balance training;bed mobility training;gait training;neuromuscular re-education;ROM;strengthening;transfer training   Clinical Impression   Criteria for Skilled Therapeutic Intervention yes, treatment indicated   PT Diagnosis Difficulty with functional mobility   Influenced by the following impairments Current supplemental O2 demands, weakness, Decreased activity tolerance   Functional limitations due to impairments Decreased activity tolerance, increased level of assist with functional mobility   Clinical Presentation Evolving/Changing   Clinical Presentation Rationale Based on current presentation, PMH, and social support.    Clinical Decision Making (Complexity) Low complexity   Therapy Frequency` daily   Predicted Duration of Therapy Intervention (days/wks) 5 days   Anticipated Equipment Needs at Discharge front wheeled walker   Anticipated Discharge Disposition Transitional Care Facility;Long Term Care Facility  (Pending medical progress)   Risk & Benefits of therapy have been explained Yes   Patient, Family & other staff in agreement with plan of care Yes   [facility] AM-PAC  \"6 Clicks\" V.2 Basic Mobility Inpatient Short Form   1. Turning from your back to your side while in a flat bed without using bedrails? 3 " - A Little   2. Moving from lying on your back to sitting on the side of a flat bed without using bedrails? 3 - A Little   3. Moving to and from a bed to a chair (including a wheelchair)? 2 - A Lot   4. Standing up from a chair using your arms (e.g., wheelchair, or bedside chair)? 2 - A Lot   5. To walk in hospital room? 2 - A Lot   6. Climbing 3-5 steps with a railing? 1 - Total   Basic Mobility Raw Score (Score out of 24.Lower scores equate to lower levels of function) 13   Total Evaluation Time   Total Evaluation Time (Minutes) 10

## 2017-09-21 NOTE — PLAN OF CARE
Problem: Goal Outcome Summary  Goal: Goal Outcome Summary  OT: Attempted intervention with encouragement and education on importance; however, pt declined.

## 2017-09-21 NOTE — PLAN OF CARE
Problem: Goal Outcome Summary  Goal: Goal Outcome Summary  PT: Orders received, chart reviewed, and evaluation completed. Patient is 82 year old male who lived in home with 3 stairs to enter and all needs met on main level once in home. Pt was independent with ADLs and household tasks prior to hospitalization. Pt reports that he used a cane for longer distances outside. Pt reports that he does not drive and did not leave the house often.  No family assist available upon discharge.      Discharge Planner PT   Patient plan for discharge: Not stated     Current status: Patient completed supine > sit with bed rail assist and CGA. Pt sat at EOB for 5 mins with CGA secondary to slight posterior loss of balance. Sit > supine with bed rail assist and cues for repositioning technique. BP in supine at rest was 99/78. BP sitting at EOB was 94/73. O2 sats remained above 87% throughout session on 4L supplemental O2 via NC.      Barriers to return to prior living situation: Current O2 needs, current level of assist, and decreased activity tolerance     Recommendations for discharge: TCU vs. LTC- pending medical course and pt willingness to participate     Rationale for recommendations: Patient will benefit from continued skilled PT intervention to improve activity tolerance and decrease level of assist in order to return to baseline function.           Entered by: Deepa Kearney 09/21/2017 12:03 PM

## 2017-09-21 NOTE — PROGRESS NOTES
[facility]    Hospitalist Progress Note    Date of Service (when I saw the patient): 09/21/2017    Assessment & Plan   Mr. 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    1) Acute hypoxemic respiratory failure and severe sepsis due to left lower lobe aspiration pneumonia and COPD exacerbation: Mr. Noguera appears markedly frail and cachectic though he denies recent weight loss to me (see H and P for documentation in fact of over 20 pound weight loss since 2014). Reportedly a papillary bladder mass had been diagnosed in 2016 but he was unable to make appointment for TURBT. On this admission, he presents for progressive weakness, dyspnea and cough. He was in marked respiratory distress at admission with hypotension, and had leukocytosis, elevated Lactate, and elevated Pro-calcitonin with large left lower lobe infiltrate. UA was essentially negative. He was started on Ceftriaxone and Azithromycin.     Since admission he has had ongoing respiratory distress and remains on high flow oxygen; blood pressures remain  systolic. He has not tolerated a BIPAP trial. WBC has risen further since admission to 25 k.    He does not seem to be improving and may have ongoing severe sepsis due to aspiration pneumonia triggering COPD exacerbation and ARDS. [place] [facility] [place]    -- He has completed 5 days of Azithromycin; we broadened his antibiotics from Ceftriaxone to Zosyn on 9/20 to cover for possible pseudomonas for which he may be at increased risk    -- Will continue inhaled steroid and Spiriva; on 9/20 we added standing nebulizers and systemic IV steroids 125 mg IV Methylprednisolone all of which will be continued today    -- Today he looks and  feels a little better with these changes. WBC now down today to 17 k. We may try to titrate the oxygen down to nasal cannula    -- Sputum cultures reveal only Candida; this was discussed with ID and that is felt to be a contaminant    -- Blood cultures from 9/20 show no growth to date    -- He has failed his swallow study and for now remains NPO; if this is not improved by the end of the week we may have to consider tube feeds    -- Low threshold for ICU transfer, intubation, and pressor support    -- He has severe hypoalbuminemia and we will consult Nutrition here.     -- I have had ongoing care goal discussions with him and he wishes to remain full code despite advice that if intubated he may never come off the ventilator; I will continue to have ongoing care discussions with him every day    2) NSTEMI and acute systolic CHF exacerbation: Cardiology was consulted. Likely due to demand ischemia from sepsis as well as suspected underlying undiagnosed CAD. He has had leg swelling and orthopnea here with elevated Troponin. TTE showed EF 30-35% with inferior and inferoapical akinesis.     -- He has been on carvedilol which is continued.    -- Diuresis has exacerbated his hypotension so this has been stopped. If hypotension worsens he will need inotropic support    3) ROBINSON: ATN due to sepsis suspected; improving, monitor daily    4) Suspected Bladder Cancer: Urology consulted, guidance appreciated; further evaluation will need to be as an outpatient if he can recover from this illness    5) 7 cm AAA: Seen incidentally on imaging; vascular Medicine consulted. May need outpatient repair.    DVT Prophylaxis: Enoxaparin  Code Status: Full Code    Disposition: Expected discharge in several days. Will order PT, OT today.    Jag Linda MD    Interval History   Looks and feels a little better today. Coughs up a large yellow sputum ball. Wants to try some water but does not feel hungry at all. No other new  complaints.    -Data reviewed today: I reviewed all new labs and imaging results over the last 24 hours. I personally reviewed no images or EKG's today.    Physical Exam   Temp: 98  F (36.7  C) Temp src: Axillary BP: 116/66 Pulse: 90 Heart Rate: 88 Resp: 18 SpO2: 98 % O2 Device: High Flow Nasal Cannula (HFNC) Oxygen Delivery: Other (Comments) (30 LPM)  There were no vitals filed for this visit.  Vital Signs with Ranges  Temp:  [97.5  F (36.4  C)-98.7  F (37.1  C)] 98  F (36.7  C)  Pulse:  [90] 90  Heart Rate:  [63-96] 88  Resp:  [16-24] 18  BP: ()/(59-72) 116/66  FiO2 (%):  [60 %-70 %] 60 %  SpO2:  [93 %-99 %] 98 %  I/O last 3 completed shifts:  In: 503 [I.V.:503]  Out: 300 [Urine:300]    Constitutional: Alert and oriented to person, place and time; less labored breathing today; remains extremely cachectic and frail  HEENT: normocephalic, very dry mucus membranes  Respiratory: lungs very diminished to auscultation bilaterally but sound clear  Cardiovascular: regular S1 S2   GI: abdomen soft non tender non distended bowel sounds positive  Skin: no rash, good turgor  Musculoskeletal: no clubbing, cyanosis or edema  Neuro: EOMI; moves all four extremities  Psych: Appropriate affect, speech non-tangential      Medications     - MEDICATION INSTRUCTIONS -       - MEDICATION INSTRUCTIONS -         piperacillin-tazobactam  4.5 g Intravenous Q6H     sodium chloride (PF)  3 mL Intracatheter Q8H     influenza Vac Split High-Dose  0.5 mL Intramuscular Prior to discharge     albuterol  3 mL Nebulization Q4H While awake     methylPREDNISolone  125 mg Intravenous Q12H     carvedilol  3.125 mg Oral BID w/meals     enoxaparin  40 mg Subcutaneous Q24H     sodium chloride (PF)  10 mL Intracatheter Once     aspirin EC  81 mg Oral Daily     atorvastatin  20 mg Oral Daily     fluticasone furoate  1 puff Inhalation Daily     tiotropium  18 mcg Inhalation Daily     tamsulosin  0.4 mg Oral Weekly       Data     Recent Labs  Lab  09/21/17  0510 09/20/17  1650 09/20/17  0845 09/19/17  0913 09/19/17  0310 09/18/17  0845 09/17/17  1214 09/17/17  0617 09/17/17  0340 09/16/17  0750 09/15/17  2149   WBC 17.0*  --  24.7*  --   --   --   --   --  19.4*  --  15.7*   HGB 14.7  --  13.6  --   --   --   --   --  15.6  --  14.9   MCV 97  --  97  --   --   --   --   --  95  --  93     --  290 259  --   --   --   --  249  --  253     --   --   --   --   --   --   --  139 139 136   POTASSIUM 3.9 3.2*  --   --   --  3.5  --   --  3.4 3.9 3.9   CHLORIDE 101  --   --   --   --   --   --   --  107 104 100   CO2 33*  --   --   --   --   --   --   --  24 27 23   BUN 38*  --   --   --   --   --   --   --  50* 69* 85*   CR 0.72  --   --   --  0.66  --   --   --  0.94 1.36* 1.97*   ANIONGAP 6  --   --   --   --   --   --   --  8 8 13   LETHA 7.8*  --   --   --   --   --   --   --  8.2* 8.0* 8.8   *  --   --   --   --   --   --   --  146* 98 154*   ALBUMIN 1.4*  --   --   --   --   --   --   --  1.6*  --  2.0*   PROTTOTAL 5.4*  --   --   --   --   --   --   --   --   --  6.2*   BILITOTAL 0.4  --   --   --   --   --   --   --   --   --  0.8   ALKPHOS 126  --   --   --   --   --   --   --   --   --  93   ALT 29  --   --   --   --   --   --   --   --   --  15   AST 35  --   --   --   --   --   --   --   --   --  20   TROPI  --   --   --   --   --   --  0.567* 0.972* 0.470*  --   --        No results found for this or any previous visit (from the past 24 hour(s)).

## 2017-09-21 NOTE — PLAN OF CARE
Problem: Goal Outcome Summary  Goal: Goal Outcome Summary  PT: Orders received, chart reviewed, and evaluation completed. Patient is 82 year old male who lived in home with 3 stairs to enter and all needs met on main level once in home. Pt was independent with ADLs and household tasks prior to hospitalization. Pt reports that he used a cane for longer distances outside. Pt reports that he does not drive and did not leave the house often.  No family assist available upon discharge.      Discharge Planner PT   Patient plan for discharge: Not stated     Current status: Patient completed supine > sit with bed rail assist and CGA. Pt sat at EOB for 5 mins with CGA secondary to slight posterior loss of balance. Sit > supine with bed rail assist and cues for repositioning technique. BP in supine at rest was 99/78. BP sitting at EOB was 94/73. O2 sats remained above 87% throughout session on 4L supplemental O2 via NC.      Barriers to return to prior living situation: Current O2 needs, current level of assist, and decreased activity tolerance     Recommendations for discharge: TCU vs. LTC- pending medical course and pt willingness to participate     Rationale for recommendations: Patient will benefit from continued skilled PT intervention to improve activity tolerance and decrease level of assist in order to return to baseline function.           Entered by: Nurys Mullen 09/21/2017 1:16 PM

## 2017-09-21 NOTE — PLAN OF CARE
Problem: Goal Outcome Summary  Goal: Goal Outcome Summary  Outcome: No Change  VSS. Tele SR with PACs. Patient does drop HR into low 50s several times overnight while asleep. High flow nasal cannula in place, o2 sats remain high 90s. Frequent productive cough continues. IV antibiotics overnight. Patient declines frequent repositioning, but does shift weight around often. Speech is garbled/hoarse. Potassium levels improving this morning. Patient remains NPO.

## 2017-09-22 NOTE — PLAN OF CARE
Problem: Goal Outcome Summary  Goal: Goal Outcome Summary  Discharge Planner SLP   Patient plan for discharge: Return home  Current status: SLP: Follow up dysphagia treatment provided. Pt alert with baseline cough noted. Pt expressed frustration with NPO, but did acknowledge frequent coughing with food at home. Oral motor exam WFL. Ice chip trialed with no difficulty. NTL via teaspoon trialed with suspect pharyngeal retention. Improved timing noted with thin water. Intermittent coughing/throat clearing noted with bedside evaluation limited. Education provided on swallowing mechanism and aspiration. Pt, RN, and MD in agreement with video swallow study at 10am today to determine aspiration risk and safest, least restrictive diet. Suspect chronic dysphagia at baseline from discussion with pt.   Barriers to return to prior living situation: Dysphagia  Recommendations for discharge: Pending video  Rationale for recommendations: Pending video       Entered by: Barb Mills 09/22/2017 9:28 AM

## 2017-09-22 NOTE — PLAN OF CARE
Problem: Goal Outcome Summary  Goal: Goal Outcome Summary  Outcome: No Change  A&Ox4. Up with walker gait belt and heavy assist of 2. VSS on 3L NC with humidity. Telemetry Sinus Rhythm with frequent PAC's. NPO+ice chips, c/o dry mouth, biotene and mouth swabs provided. Infrequent cough with thick yellow sputum. Lung sounds coarse and diminished.  Skin alec BLE, coccyx covered in Mepilex CDI, abrasions on mid back redressed this shift with Mepilex.  Fluids running at 50cc/hr NS. 200 mL urine output this shift. NPO due to swallowing issues. Plan: Continue to monitor for s/s of respiratory distress and administer abx as scheduled.

## 2017-09-22 NOTE — PROGRESS NOTES
"Care Transition Initial Assessment - JEFF  Reason For Consult: discharge planning  Met with: chart review    Active Problems:    Pneumonia         DATA  Lives With: alone  Living Arrangements: house  Description of Support System: nephew involved-no other family  Who is your support system?: nephew  Support Assessment: Adequate family and caregiver support-nephew  Identified issues/concerns regarding health management: SW following for d.c needs  Quality Of Family Relationships: uninvolved  Transportation Available: none    ASSESSMENT  Cognitive Status:  Per RN note, pt is alert and oriented X4  Concerns to be addressed: Patient is a 82 year old male who was admitted to the hospital for pneumonia. Prior to hospitalization patient was living at home alone. MD met with patient and explained \"all foods and liquids are being aspirated by him at the present time... dysphagia is unlikely to be cured at this point.\" Patient is aware that hospice is an option at d/c. Patient has requested to meet with palliative to discuss GOC a bit further before confirming any d/c plan at this time. SW will continue to follow and assist as needed.     ADDENDUM  I: SW met with patient and nephew to discuss d/c plan. Patient confirmed that he would like to d/c with hospice and was ok with using  Hospice. SW spoke with Donna from  Hospice who confirmed 1000 consult time on 9/25. Patient's nephew would like to be in attendance to the hospice consult. Zena from [facility], provided pt with ACD-Short form. completed ACD placed on chart.    PLAN  Financial costs for the patient includes   Patient given options and choices for discharge   Patient/family is agreeable to the plan?    Patient Goals and Preferences: TBD  Patient anticipates discharging to:  D    ROSALEE Su   *[phone]              "

## 2017-09-22 NOTE — PLAN OF CARE
Problem: Goal Outcome Summary  Goal: Goal Outcome Summary  Discharge Planner OT   Patient plan for discharge: Home  Current status: Pt completed supine to sit EOB SBA, mod A of 2 sit to stand, amb ~ 5' forward with FWW min A of 2, backward mod A of 2 as pt declined to turn around to amb back to bed. SBA sit to supine and assist of 2 to reposition up in bed.    Barriers to return to prior living situation: decreased balance, overall strength, activity tolerance, decreased safety/cognition and lives alone  Recommendations for discharge: TCU per plan established by the Occupational Therapist  Rationale for recommendations: Pt would benefit from daily therapy to return to PLOF       Entered by: Allison Sal 09/22/2017 12:16 PM

## 2017-09-22 NOTE — PROGRESS NOTES
"   09/22/17 1051   General Information   Onset Date 09/15/17   Start of Care Date 09/16/17   Referring Physician Joseph Salvador MD   Patient Profile Review/OT: Additional Occupational Profile Info See Profile for full history and prior level of function   Patient/Family Goals Statement Pt states his \"throat doesn't work\"   Swallowing Evaluation Videofluoroscopic evaluation   Behaviorial Observations Alert;Combative/agitated   Mode of current nutrition NPO   Respiratory Status Room air   Comments Bong Noguera is an 82-year-old man with history of COPD, and a bladder tumor which appeared to be papillary bladder cancer noted in 07/2016 by Dr. Trujillo. He has had no follow-up procedure for this. He presents today just due to generalized weakness, and was found to be hypoxic, initially in the mid-80s on room air. Pt endorses difficulty swallowing marked by things getting \"stuck\" however pt very limited in case hx often getting frustrated with questions asked by ST. When asking pt what he has been currently eating he states \"strawberry malts\" and did not elaborate further despite encouargement. Bedside swallow eval completed per MD orders to further assess oropharyngeal swallow function.    VFSS Eval: Radiology   Radiologist Dr. Dhillon   Views Taken left lateral   Physical Location of Procedure [facility] room 5   VFSS Eval: Thin Liquid Texture Trial   Mode of Presentation, Thin Liquid spoon;cup   Order of Presentation 1, 2   Preparatory Phase Poor bolus control;Holding   Oral Phase, Thin Liquid Poor AP movement;Premature pharyngeal entry   Pharyngeal Phase, Thin Liquid Delayed swallow reflex;Pharyngeal wall coating;Residue in valleculae;Residue in pyriform sinus   Rosenbek's Penetration Aspiration Scale: Thin Liquid Trial Results 8 - contrast passes glottis, visible subglottic residue remains, absent patient response (aspiration)   Response to Aspiration absent response, silent aspiration   Diagnostic Statement " initally ok with 1/2 tsp, large amount of silent aspiration with cup sip   VFSS Eval: Nectar Thick Liquid Texture Trial   Mode of Presentation, Nectar cup   Order of Presentation 3   Preparatory Phase Insufficient mastication;Holding   Oral Phase, Nectar Poor AP movement;Premature pharyngeal entry   Pharyngeal Phase, Nectar Delayed swallow reflex;Pharyngeal wall coating;Residue in valleculae;Residue in pyriform sinus   Rosenbek's Penetration Aspiration Scale: Nectar-Thick Liquid Trial Results 8 - contrast passes glottis, visible subglottic residue remains, absent patient response (aspiration)   Response to Aspiration, Nectar absent response, silent aspiration   Diagnostic Statement silent aspiration   VFSS Eval: Puree Solid Texture Trial   Mode of Presentation, Puree fed by clinician;spoon   Order of Presentation 4   Preparatory Phase Poor bolus control;Holding   Oral Phase, Puree Poor AP movement;Residue in oral cavity;Premature pharyngeal entry   Pharyngeal Phase, Puree Delayed swallow reflex;Pharyngeal wall coating;Residue in valleculae;Residue in pyriform sinus   Rosenbek's Penetration Aspiration Scale: Puree Food Trial Results 8 - contrast passes glottis, visible subglottic residue remains, absent patient response (aspiration)   Response to Aspiration, Puree absent response, silent aspiration   Diagnostic Statement silent aspiration; significant residue   Swallow Compensations   Swallow Compensations Supraglottic swallow   General Therapy Interventions   Planned Therapy Interventions Dysphagia Treatment   Dysphagia treatment Oropharyngeal exercise training   Swallow Eval: Clinical Impressions   Skilled Criteria for Therapy Intervention Skilled criteria met.  Treatment indicated.   Functional Assessment Scale (FAS) 1   Treatment Diagnosis Severe dysphagia   Diet texture recommendations NPO   Therapy Frequency 3 times/wk   Predicted Duration of Therapy Intervention (days/wks) 1 week   Anticipated Discharge  Disposition extended care facility   Risks and Benefits of Treatment have been explained. Yes   Patient, family and/or staff in agreement with Plan of Care Yes   Clinical Impression Comments SLP: Pt presents with severe dysphagia on video swallow study. Deficits include: oral management difficulty with reduced bolus propulsion, reduced tongue base retraction, no epiglottic movement, and reduced pharyngeal contraction. These deficits resulted in: delayed swallow initation with pooling to pyriforms, silent aspiration with initial swallow of thin liquids via cup and additional aspiration on residue, silent aspiration with nectar thick liquids and silent aspiration with signficant residue with puree textures. Pt minimally responsive to cues for swallow strong, cough, swallow again. Strategy minimally cleared residue. No penetration or aspiration with 1/2 tsp of thin liquids via teaspoon initially, however, moderate residue noted that puts pt at a high risk of aspiration with subsequent trials. Recommended: strict NPO with lightly moistended swab. Attempted to educate pt on results, however, he became agitated and threatened to spit at clinician. MD and RN educated on recommendations. If pt would like to continue PO diet despite risks, would recommend 1/2 tsp of clear liquid diet with use of swallow, cough, swallow with every sip.    Total Evaluation Time   Total Evaluation Time (Minutes) 35

## 2017-09-22 NOTE — PROGRESS NOTES
"Care Coordination:    Discussed pt with MD & bedside RN.  Will follow.    Did place SW consult to follow as well, as it appears disposition may be TCU vs. Home with hospice.    Noted pt does not have a scanned health care directive on file.    Noted pt's contacts are:   + Chuckie B.O., \"brother\" ([phone])  + Brooks Noguera, \"other,\" ([phone])      Brigette Pike RN, BSN  [facility] Care Coordinator   Mobile Phone: [phone]    "

## 2017-09-22 NOTE — CONSULTS
"[facility]    Palliative Care Consultation     Bong Noguera  MRN# [id number]  Date of Admission:  9/15/2017  Date of Service (when I saw the patient): 09/22/17  Reason for consult: Consulted by Dr. Linda for Goals of care    Assessment & Plan   Mr. 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    Symptoms/Recommendations   1. Goals of Care. Met with Bong this morning. Discussed his wish to continue with oral intake despite the risk of developing a recurrent aspiration pneumonia. We discussed transitioning our focus to how we can keep him comfortable for whatever time he has left. We discussed hospice cares and how that services can help support him after discharge from the hospital. We discussed discharging to home with 24 hour hired nursing care vs to a LTC facility. Recommended he meet with hospice to further discuss how their services can be helpful for him.     ADDENDUM: Pt's nephew Brooks arrived at hospital and I met with him and Mr Noguera together. I reviewed our discussion which included exploring plans to discharge to home with hospice. Mr Noguera stated, \"so if I eat or drink anything I might die?\" I explained the risk of recurrent aspiration pneumonia and stated that he had expressed a wish to eat and drink despite this risk. Mr Noguera stated, \"I did no such thing.\" I then reviewed with him his two options. One being to have a PEG tube placed for ongoing nutrition with a plan to d/c to TCU in an effort to regain some strength but understanding that the underlying suspected malignancy is likely what is causing his global weakness and he may not get much better. Also discussed the continued risk for aspiration pneumonia with the feeding tube in " "place. The second option would be to shift our focus to his comfort, allow him to eat and drink for comfort/pleasure and work with hospice to develop a safe discharge plan, potentially in his home with 24 hour caregivers. Brooks verbalized understanding and tells me they will continue to discuss these options together. Unit SW updated and will arrange an informational hospice meeting. I also provided a short form health care directive so Mr Noguera could name his nephew Brooks as his HCA.     Support/Coping  -nephew Brooks at bedside   -Will involve Palliative LICSW, Tish Chaidez, and/or Palliative , Herlinda Kendall    Decisional Support, Goals of Care, Counseling & Coordination  Decisional Capacity Intact?  -Yes - though appears possibly to have some short term memory troubles  Health Care Directive on File?  -No - provided short form Health care directive   Code Status/Resuscitation Preferences?  -DNR/DNI  Plan of Care?  -pending ongoing conversations    Discussion  Introduced the scope of our practice to Mr Noguera. Discussed our potential roles for symptom management, support/coping, and decisional support (aka goals of care).     Met with Mr Noguera this afternoon. He is seen sitting up in bed eating jello. He appears slightly annoyed by my presence and is sarcastic with some of his remarks. I reviewed the discussion he had had earlier today with Dr Linda regarding his risk for aspiration and recurrent pneumonias. I reviewed that he had expressed his wish to be allowed to eat and drink despite these risks, Mr Noguera agreed and requested ice water. The nursing assistant brought him a glass and as he took a drink of it he said, \"that is worth dying for.\" We continued our conversation regarding what our plans should be moving forward. I explained to him what a comfort focused pathway would involved and recommended he meet with the hospice team to obtain more information regarding their services. Mr Noguera was " "agreeable to meeting with them. I then discussed the 3 places hospice cares can be provided (home, LTC facility, free standing hospice house). Mr Noguera states he would prefer to be home but didn't think he would be able to care for himself in his weakened state. I explained that sometimes people hire in nursing help if they have the means. Mr Noguera thought this would be a good plan. I offered to call Brooks to update him on our conversation and he said that would be okay. He took another drink of water and said, \"so what you are saying is each drink makes me closer to death.\" I told him the more he eats and drinks, the higher the risk that he will develop a pneumonia and that may be life threatening. I asked if he had any worries about dying and he said none-what-so-ever. I asked if he felt he would be comfortable with the plan to not re-hospitalize him when he develops his next infection and he stated, \"I don't know, I can't predict the future.\" I explained to him that when people discharge with hospice cares, they do not return to the hospital for aggressive treatment. Instead we allow the natural dying process to happen. Mr Noguera nodded. I again asked if it would be okay if I contacted Brooks and Mr Noguera said that would be fine.     Thank you for involving us in the care of this patient and family. We will continue to follow. Please do not hesitate to contact me with questions or concerns or the on-call provider for our team if evening or weekend.    Vida AVENDANO, [facility]  Palliative Medicine   Pager [phone]    Attestation:  Total time on the floor involved in the patient's care: 70 minutes  Total time spent in counseling/care coordination: >50%    Chief Complaint   Goals of Care    History is obtained from the patient, staff, and extensive chart review.     Adopted from H&P  Bong Noguera is an 82-year-old gentleman who lives alone, and called 911 today due to progressive generalized weakness which has " "been coming on gradually, in his words, for \"years.\"  Unfortunately, he is not very forthcoming in information, is resistant to answering all of the questions, repeatedly stating, \"It's on my list.\"  He has a list of clinic and doctor visits that I reviewed as well as discussed with the patient.  Basically, he has been using a walker at home, able to get around, but it has been more and more difficult.  He has had significant weight loss in the last several years, but it has stabilized more recently.  Weight was 155 in 2011, 149 in 2014, and 136 in 2016, and has remained in the 130s.  He has chronic shortness of breath with exertion, and he states this has not changed.  He also has a chronic cough that is productive of yellow sputum which he states has not changed.  He has chronic pain in his chest as well with coughing and deep breathing.  Again, he states there has been no recent change in this.  He denies any acute changes.       However, in the ED, multiple abnormalities were found which appear acute in nature.  He was afebrile.  His heart rate was slightly elevated at 108.  Initial blood pressure was 86/61, respirations 24, and oxygenation was 93% on 3 liters.  His labs are significant for an elevated creatinine of 1.97, which is up from 0.71 on 07/20.  His sodium and potassium were normal at 139 and 3.9, and BUN was up at 85.  His white blood cell count was 15.7.  Urinalysis was not that exciting, with 13 white blood cells and 7 RBC, and he denies any urinary symptoms at this time.  His lactic acid was 2.2.  Procalcitonin was 5.22.  VBG reveals pH of 7.43, CO2 of 34, O2 of 64.       His chest x-ray reveals an extensive opacity of the left lower lobe, but otherwise, lungs are clear without any evidence of pleural effusion or edema.       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  It appears he was supposed to follow up for that, " but has not yet done so.  He has an appointment with Dr. Trujillo in the next couple of months.       The rest of his 10-point review of systems was unremarkable, and again he denies any acute symptoms bringing him in, just generalized weakness.  It should also be noted that when the EMTs went to go to his house, he had multiple sticky notes around the house with things that stated future appointments, and also a reminder to breathe deeply, but when the patient is asked questions of orientation, he is able to answer everything but the day of the week, stating that he no longer keeps track of this.  Otherwise he appears oriented.     Past Medical History    I have reviewed this patient's medical history and updated it with pertinent information if needed.   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 1/30/2015       Past Surgical History   I have reviewed this patient's surgical history and updated it with pertinent information if needed.    Social History   Living situation: lives independently in his home in [facility]    Family system: not , no children. 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     Allergies   Allergies   Allergen Reactions     Ciprofloxacin      Clindamycin Hcl      Hydrocodone      Was no help in the past     Tylenol With Codeine  [Acetaminophen-Codeine]      He things he passed out with this       Medications   Current Facility-Administered Medications Ordered in Epic   Medication Dose Route Frequency Last Rate Last Dose     nicotine Patch in Place   Transdermal Q8H         [START ON 9/23/2017] nicotine patch REMOVAL   Transdermal Daily         nicotine (NICODERM CQ) 14 MG/24HR 24 hr patch 1 patch  1 patch Transdermal Daily   1 patch at 09/22/17 1306     metoprolol (LOPRESSOR) injection 2.5 mg  2.5 mg Intravenous Q4H PRN         artificial saliva (BIOTENE MT) spray 1 spray  1 spray Mouth/Throat Q6H PRN   1 spray at 09/21/17 2054     0.9% sodium chloride infusion   Intravenous Continuous 50 mL/hr at 09/21/17 2218       piperacillin-tazobactam (ZOSYN) 4.5 g vial to attach to  mL bag  4.5 g Intravenous Q6H 200 mL/hr at 09/22/17 0628 4.5 g at 09/22/17 1257     lidocaine 1 % 1 mL  1 mL Other Q1H PRN         lidocaine (LMX4) cream   Topical Q1H PRN         sodium chloride (PF) 0.9% PF flush 3 mL  3 mL Intracatheter Q1H PRN         sodium chloride (PF) 0.9% PF flush 3 mL  3 mL Intracatheter Q8H   3 mL at 09/21/17 1804     nitroGLYcerin (NITROSTAT) sublingual tablet 0.4 mg  0.4 mg Sublingual Q5 Min PRN         influenza Vac Split High-Dose (FLUZONE) injection 0.5 mL  0.5 mL Intramuscular Prior to discharge         albuterol neb solution 2.5 mg  3 mL Nebulization Q4H While awake   2.5 mg at 09/22/17 1215     albuterol (PROVENTIL) neb solution 2.5 mg  2.5 mg Nebulization Q2H PRN         methylPREDNISolone sodium succinate (solu-MEDROL) injection 125 mg  125 mg Intravenous Q12H   125 mg at 09/22/17 0403     melatonin tablet 3 mg  3 mg Oral At Bedtime PRN         carvedilol (COREG) tablet 3.125 mg  3.125 mg Oral BID w/meals   3.125 mg at 09/20/17 0806     No lozenges or gum should be given while patient on BIPAP/AVAPS/AVAPS AE   Does not apply Continuous PRN         hypromellose-dextran (ARTIFICAL TEARS) ophthalmic solution 1 drop  1 drop Both  Eyes Q1H PRN         Patient may continue current oral medications   Does not apply Continuous PRN         enoxaparin (LOVENOX) injection 40 mg  40 mg Subcutaneous Q24H   40 mg at 09/21/17 0912     sodium chloride (PF) 0.9% PF flush 10 mL  10 mL Intracatheter Once         sodium chloride (OCEAN) 0.65 % nasal spray 1-2 spray  1-2 spray Nasal Q1H PRN   2 spray at 09/22/17 0027     aspirin EC EC tablet 81 mg  81 mg Oral Daily   81 mg at 09/21/17 0912     atorvastatin (LIPITOR) tablet 20 mg  20 mg Oral Daily   20 mg at 09/21/17 0912     fluticasone (FLONASE) 50 MCG/ACT spray 1 spray  1 spray Both Nostrils Daily PRN   1 spray at 09/18/17 1825     pseudoePHEDrine (SUDAFED) tablet 30 mg  30 mg Oral Daily PRN   30 mg at 09/18/17 1824     fluticasone furoate (ARNUITY ELLIPTA) 100 MCG/ACT inhalation powder 1 puff  1 puff Inhalation Daily   1 puff at 09/21/17 0924     tiotropium (SPIRIVA) capsule 18 mcg  18 mcg Inhalation Daily   18 mcg at 09/21/17 1216     tamsulosin (FLOMAX) capsule 0.4 mg  0.4 mg Oral Weekly   0.4 mg at 09/16/17 0239     potassium chloride SA (K-DUR/KLOR-CON M) CR tablet 20-40 mEq  20-40 mEq Oral Q2H PRN         potassium chloride (KLOR-CON) Packet 20-40 mEq  20-40 mEq Oral or Feeding Tube Q2H PRN         potassium chloride 10 mEq in 100 mL intermittent infusion  10 mEq Intravenous Q1H PRN         potassium chloride 10 mEq in 100 mL intermittent infusion with 10 mg lidocaine  10 mEq Intravenous Q1H PRN   10 mEq at 09/20/17 2141     potassium chloride 20 mEq in 50 mL intermittent infusion  20 mEq Intravenous Q1H PRN         acetaminophen (TYLENOL) tablet 650 mg  650 mg Oral Q4H PRN   650 mg at 09/19/17 0923     naloxone (NARCAN) injection 0.1-0.4 mg  0.1-0.4 mg Intravenous Q2 Min PRN         senna-docusate (SENOKOT-S;PERICOLACE) 8.6-50 MG per tablet 1-2 tablet  1-2 tablet Oral BID PRN         polyethylene glycol (MIRALAX/GLYCOLAX) Packet 17 g  17 g Oral Daily PRN         bisacodyl (DULCOLAX) Suppository 10  mg  10 mg Rectal Daily PRN         ondansetron (ZOFRAN-ODT) ODT tab 4 mg  4 mg Oral Q6H PRN        Or     ondansetron (ZOFRAN) injection 4 mg  4 mg Intravenous Q6H PRN         guaiFENesin (ROBITUSSIN) 20 mg/mL solution 10 mL  10 mL Oral Q4H PRN   10 mL at 09/19/17 0923     nicotine polacrilex (NICORETTE) gum 2 mg  2 mg Buccal Q1H PRN   2 mg at 09/22/17 1306     Current Outpatient Prescriptions Ordered in Epic   Medication     tamsulosin (FLOMAX) 0.4 MG capsule       Review of Systems   The comprehensive review of systems is negative other than noted in the assessment/plan.    Physical Exam   Temp: 95.4  F (35.2  C) Temp src: Oral BP: 104/59   Heart Rate: 80 Resp: 18 SpO2: 97 % O2 Device: Nasal cannula Oxygen Delivery: 3 LPM  Vitals:    09/22/17 0658   Weight: 65 kg (143 lb 4.8 oz)     CONSTITUTIONAL: NAD, A&Ox3, though maybe a little forgetful. Calm and cooperative.  HEENT: NCAT, MMM  NECK: Supple  CARDIOVASCULAR: exam deferred   RESPIRATORY: NL respiratory effort on oxygen via NC, productive cough of yellow sputum noted during conversation   GASTROINTESTINAL: exam deferred  MUSCULOSKELETAL: No extremity edema. Moving freely in bed   SKIN: Warm and intact. No concerning lesions or rashes on exposed skin surfaces   NEUROLOGIC: Appropriately responsive during interview  PSYCH: Affect congruent     Data   Results for orders placed or performed during the hospital encounter of 09/15/17 (from the past 24 hour(s))   Basic metabolic panel   Result Value Ref Range    Sodium 141 133 - 144 mmol/L    Potassium 3.8 3.4 - 5.3 mmol/L    Chloride 100 94 - 109 mmol/L    Carbon Dioxide 34 (H) 20 - 32 mmol/L    Anion Gap 7 3 - 14 mmol/L    Glucose 144 (H) 70 - 99 mg/dL    Urea Nitrogen 52 (H) 7 - 30 mg/dL    Creatinine 0.76 0.66 - 1.25 mg/dL    GFR Estimate >90 >60 mL/min/1.7m2    GFR Estimate If Black >90 >60 mL/min/1.7m2    Calcium 8.0 (L) 8.5 - 10.1 mg/dL   CBC with platelets differential   Result Value Ref Range    WBC 24.3 (H)  4.0 - 11.0 10e9/L    RBC Count 4.41 4.4 - 5.9 10e12/L    Hemoglobin 14.2 13.3 - 17.7 g/dL    Hematocrit 42.8 40.0 - 53.0 %    MCV 97 78 - 100 fl    MCH 32.2 26.5 - 33.0 pg    MCHC 33.2 31.5 - 36.5 g/dL    RDW 13.7 10.0 - 15.0 %    Platelet Count 405 150 - 450 10e9/L    Diff Method Automated Method     % Neutrophils 91.3 %    % Lymphocytes 3.6 %    % Monocytes 2.7 %    % Eosinophils 0.0 %    % Basophils 0.1 %    % Immature Granulocytes 2.3 %    Nucleated RBCs 0 0 /100    Absolute Neutrophil 22.2 (H) 1.6 - 8.3 10e9/L    Absolute Lymphocytes 0.9 0.8 - 5.3 10e9/L    Absolute Monocytes 0.7 0.0 - 1.3 10e9/L    Absolute Eosinophils 0.0 0.0 - 0.7 10e9/L    Absolute Basophils 0.0 0.0 - 0.2 10e9/L    Abs Immature Granulocytes 0.6 (H) 0 - 0.4 10e9/L    Absolute Nucleated RBC 0.0    XR Video Swallow w/o Esophagram    Narrative    VIDEO SWALLOWING EVALUATION   9/22/2017 10:31 AM     HISTORY: Overt signs and symptoms of aspiration on bedside.    COMPARISON: None.    FLUOROSCOPY TIME: 0.8 minutes.  SPOT IMAGES OR CINE RUNS: 5.    FINDINGS:    Thin: Pooling in the vallecula with a small sip spoon. When given a  drink by cup, there was silent aspiration.    Nectar: Silent aspiration.    Honey: Not administered.    Pudding: Silent aspiration. There is moderate residual debris in the  vallecula.    Semisolid: Not administered.    Solid: Not administered.    This study only includes the cervical esophagus.    ANDRE MAURICIO MD

## 2017-09-22 NOTE — PROGRESS NOTES
[facility]    Hospitalist Progress Note    Date of Service (when I saw the patient): 09/22/2017    Assessment & Plan   Mr. 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 His care is rending toward comfort measures only and treatments should not be escalated if he gets sicker    1) Acute hypoxemic respiratory failure and severe sepsis due to left lower lobe aspiration pneumonia and COPD exacerbation: Mr. Noguera appears markedly frail and cachectic though he denies recent weight loss to me (see H and P for documentation in fact of over 20 pound weight loss since 2014). Reportedly a papillary bladder mass had been diagnosed in 2016 but he was unable to make appointment for TURBT. On this admission, he presents for progressive weakness, dyspnea and cough. He was in marked respiratory distress at admission with hypotension, and had leukocytosis, elevated Lactate, and elevated Pro-calcitonin with large left lower lobe infiltrate. UA was essentially negative. He was started on Ceftriaxone and Azithromycin.     Since admission he has had ongoing respiratory distress and remains on high flow oxygen; blood pressures remain  systolic. He has not tolerated a BIPAP trial. WBC has risen further since admission to 25 k.    He likely has ongoing severe sepsis due to aspiration pneumonia triggering COPD exacerbation and ARDS. We started steroids and broadened antibiotics on 9/20 and since then he has been doing a little better.    -- He has completed 5 days of Azithromycin; we broadened his antibiotics from Ceftriaxone to Zosyn on 9/20 to cover for possible pseudomonas for which he may be at increased risk; we will continue that today    -- Will continue inhaled steroid  "and Spiriva; on 9/20 we added standing nebulizers and systemic IV steroids 125 mg IV Methylprednisolone, all of which will be continued today    -- Oxygen is now down to nasal cannula; we will continue to try to wean as tolerated    -- Sputum cultures reveal only Candida; this was discussed with ID and that is felt to be a contaminant    -- Blood cultures from 9/20 show no growth to date    -- He has failed his swallow study and by video swallow study he essentially aspirates everything    -- He has severe hypoalbuminemia and we will consult Nutrition here for likely severe protein calorie malnutrition.     2) Disposition: I have discussed with Mr. Noguera in extensive detail this morning that all foods and liquids are being aspirated by him at the present time. I explained that this dysphagia is unlikely to be cured at this point. I told him the dysphagia could be related to underlying malignancy which has caused him to become extremely frail and cachectic. I explained that one treatment course at this point would involve feeding tube placement and TCU discharge to try to get stronger, to follow up with Urology and see if any treatment such as surgery could be performed for his bladder tumor; he expressed that he did not wish to pursue that course. I further reiterated that the next episode of pneumonia will likely happen soon and he said that he would want to be kept comfortable at that point, and not be resuscitated or intubated if he got sicker. I asked what his most pressing concern is at the present time and he said \"to drink water.\" Thus he will be DNR DNI and diet advanced for comfort. He also added that he wants to \"continue the medicine\" including current antibiotics and steroids and I assured him we would do so. I explained what Palliative Care and Hospice were and he would like to speak with a Palliative care provider further. He asked if this means \"you are going to kill me\" and I explained that I do not " practise euthanasia; a hospice care plan would involve focus solely on comfort measures if he gets sicker and he seemed to agree this may be the best plan for him going forward.    This afternoon his nephew arrives, who has not seen him in 35 years and says he has always been reclusive. He says his uncle certainly wound not want any escalation of care at this point. He says he will help coordinate transition to hospice at a SNF and would like to speak further with the Palliative Care team by phone.    -- DNR DNI, and he will try to eat and drink for comfort and understands he may die if he does this    -- Palliative Care consulted. Likely plan will be to transition to SNF on Monday or Tuesday    3) NSTEMI and acute systolic CHF exacerbation: Cardiology was consulted. Likely due to demand ischemia from sepsis as well as suspected underlying undiagnosed CAD. He has had leg swelling and orthopnea here with elevated Troponin. TTE showed EF 30-35% with inferior and inferoapical akinesis.     -- He has been on Carvedilol which is continued for now.    -- Diuresis has exacerbated his hypotension so this has been stopped. IV fluid started overnight cautiously for oliguria which is continued for now    3) ROBINSON: ATN due to sepsis suspected; improving, monitor daily    4) Suspected Bladder Cancer: Urology consulted, guidance appreciated; further evaluation would need to be as an outpatient and likely will not happen at this point    5) 7 cm AAA: Seen incidentally on imaging; Vascular Medicine consulted. Further treatment deferred.    DVT Prophylaxis: Enoxaparin  Code Status: DNR/DNI    Disposition: As above    Jag Linda MD    Interval History   Coughing still. Intermittently angry but fully understands everything happening to him. No new complaints. Mainly wants to eat, drink, and chew some gum.    -Data reviewed today: I reviewed all new labs and imaging results over the last 24 hours. I personally reviewed no  images or EKG's today.    Physical Exam   Temp: 96.3  F (35.7  C) Temp src: Oral BP: 97/59   Heart Rate: 82 Resp: 18 SpO2: 94 % O2 Device: Nasal cannula with humidification Oxygen Delivery: 3 LPM  Vitals:    09/22/17 0658   Weight: 65 kg (143 lb 4.8 oz)     Vital Signs with Ranges  Temp:  [95.2  F (35.1  C)-97.6  F (36.4  C)] 96.3  F (35.7  C)  Heart Rate:  [65-95] 82  Resp:  [9-20] 18  BP: ()/(56-88) 97/59  SpO2:  [91 %-97 %] 94 %  I/O last 3 completed shifts:  In: 100 [I.V.:100]  Out: 600 [Urine:600]    Constitutional: Alert and oriented to person, place and time; much less labored breathing today; remains extremely cachectic and frail  HEENT: normocephalic, very dry mucus membranes  Respiratory: lungs CTAF B now  Cardiovascular: regular S1 S2   GI: abdomen soft non tender non distended bowel sounds positive  Skin: no rash, good turgor  Musculoskeletal: no clubbing, cyanosis or edema  Neuro: EOMI; moves all four extremities  Psych: Appropriate affect, speech non-tangential      Medications     NaCl 50 mL/hr at 09/21/17 2218     - MEDICATION INSTRUCTIONS -       - MEDICATION INSTRUCTIONS -         nicotine   Transdermal Q8H     [START ON 9/23/2017] nicotine   Transdermal Daily     nicotine  1 patch Transdermal Daily     piperacillin-tazobactam  4.5 g Intravenous Q6H     sodium chloride (PF)  3 mL Intracatheter Q8H     influenza Vac Split High-Dose  0.5 mL Intramuscular Prior to discharge     albuterol  3 mL Nebulization Q4H While awake     methylPREDNISolone  125 mg Intravenous Q12H     carvedilol  3.125 mg Oral BID w/meals     enoxaparin  40 mg Subcutaneous Q24H     sodium chloride (PF)  10 mL Intracatheter Once     aspirin EC  81 mg Oral Daily     atorvastatin  20 mg Oral Daily     fluticasone furoate  1 puff Inhalation Daily     tiotropium  18 mcg Inhalation Daily     tamsulosin  0.4 mg Oral Weekly       Data     Recent Labs  Lab 09/22/17  0717 09/21/17  0510 09/20/17  1650 09/20/17  0845  09/19/17  0310   09/17/17  1214 09/17/17  0617 09/17/17  0340  09/15/17  2149   WBC 24.3* 17.0*  --  24.7*  --   --   --   --   --  19.4*  --  15.7*   HGB 14.2 14.7  --  13.6  --   --   --   --   --  15.6  --  14.9   MCV 97 97  --  97  --   --   --   --   --  95  --  93    315  --  290  < >  --   --   --   --  249  --  253    140  --   --   --   --   --   --   --  139  < > 136   POTASSIUM 3.8 3.9 3.2*  --   --   --   < >  --   --  3.4  < > 3.9   CHLORIDE 100 101  --   --   --   --   --   --   --  107  < > 100   CO2 34* 33*  --   --   --   --   --   --   --  24  < > 23   BUN 52* 38*  --   --   --   --   --   --   --  50*  < > 85*   CR 0.76 0.72  --   --   --  0.66  --   --   --  0.94  < > 1.97*   ANIONGAP 7 6  --   --   --   --   --   --   --  8  < > 13   LETHA 8.0* 7.8*  --   --   --   --   --   --   --  8.2*  < > 8.8   * 148*  --   --   --   --   --   --   --  146*  < > 154*   ALBUMIN  --  1.4*  --   --   --   --   --   --   --  1.6*  --  2.0*   PROTTOTAL  --  5.4*  --   --   --   --   --   --   --   --   --  6.2*   BILITOTAL  --  0.4  --   --   --   --   --   --   --   --   --  0.8   ALKPHOS  --  126  --   --   --   --   --   --   --   --   --  93   ALT  --  29  --   --   --   --   --   --   --   --   --  15   AST  --  35  --   --   --   --   --   --   --   --   --  20   TROPI  --   --   --   --   --   --   --  0.567* 0.972* 0.470*  --   --    < > = values in this interval not displayed.    Recent Results (from the past 24 hour(s))   XR Video Swallow w/o Esophagram    Narrative    VIDEO SWALLOWING EVALUATION   9/22/2017 10:31 AM     HISTORY: Overt signs and symptoms of aspiration on bedside.    COMPARISON: None.    FLUOROSCOPY TIME: 0.8 minutes.  SPOT IMAGES OR CINE RUNS: 5.    FINDINGS:    Thin: Pooling in the vallecula with a small sip spoon. When given a  drink by cup, there was silent aspiration.    Nectar: Silent aspiration.    Honey: Not administered.    Pudding: Silent aspiration. There is moderate  residual debris in the  vallecula.    Semisolid: Not administered.    Solid: Not administered.    This study only includes the cervical esophagus.    ANDRE MAURICIO MD

## 2017-09-22 NOTE — PROGRESS NOTES
HOSPITALIST SERVICE CROSS-COVER NOTE:    Started on IV NS at 50 ml/hr due to concern for dehydration. The rate can be increased in am if CHF allows.      Leticia Aaron MD  Hospitalist  Pager # [phone]

## 2017-09-22 NOTE — PLAN OF CARE
"Problem: Goal Outcome Summary  Goal: Goal Outcome Summary  Outcome: No Change  Shift Update: A&Ox4. Up with walker gait belt and heavy assist of 2. VSS on 3L NC with humidity. TELE dc'd, changed to DNR/DNI, more palliative path, ok for PO for comfort, pt making statements he is \"not gong to make it out of here\" monitor intake! Likely Hospice DC Monday or so.      "

## 2017-09-22 NOTE — PROGRESS NOTES
"Brief note, full not to follow. I have discussed with Mr. Noguera in extensive detail this morning that all foods and liquids are being aspirated by him at the present time. I explained that this dysphagia is unlikely to be cured at this point. I told him the dysphagia could be related to underlying malignancy which has caused him to become extremely frail and cachectic. I explained that one treatment course at this point would involve feeding tube placement and TCU discharge to try to get stronger, to follow up with Urology and see if any treatment such as surgery could be performed for his bladder tumor; he expressed that he did not wish to pursue that course. I further reiterated that the next episode of pneumonia will likely happen soon and he said that he would want to be kept comfortable at that point, and not be resuscitated or intubated if he got sicker. I asked what his most pressing concern is at the present time and he said \"to drink water.\" Thus he will be DNR DNI and diet advanced for comfort. He also added that he wants to \"continue the medicine\" including current antibiotics and steroids and I assured him we would do so. I explained what Palliative Care and Hospice were and he would like to speak with a Palliative care provider further. He asked if this means \"you are going to kill me\" and I explained that I do not practise euthanasia; a hospice care plan would involve focus solely on comfort measures if he gets sicker and he seemed to agree this may be the best plan for him going forward.    Jag Linda  "

## 2017-09-22 NOTE — PLAN OF CARE
"Problem: Goal Outcome Summary  Goal: Goal Outcome Summary  PT: Patient declined participating in therapy this PM due to being busy with paperwork. Patient requested PT return tomorrow to \"improve his strength.\"       "

## 2017-09-22 NOTE — PROVIDER NOTIFICATION
MD Notification    Person notified: Dr. Aaron    Date/Time: 9/21 21:49    Interaction: Phone call    Purpose of Notification: Concern for dehydration, zero urine output this shift, dry mouth    Orders Received: 50 cc NS continuous

## 2017-09-22 NOTE — PLAN OF CARE
Problem: Goal Outcome Summary  Goal: Goal Outcome Summary  Outcome: No Change  A&Ox4, at times difficult to assess orientation due to sporadic responses and inappropriate comments at times.  Up with walker gait belt and assist of 2. VSS on 3L NC with humidity.  Telemetry Sinus Rhythm with PVC's.  Infrequent cough with thick yellow sputum.  Lung sounds coarse and diminished.  Skin alec BLE, coccyx covered in Mepilex CDI, abrasions on mid back redressed this shift with Mepilex.  No urine output this shift and NPO status, MD notified and 50cc NS continuous ordered.  Bowel sounds present with large BM this shift.  New PIV placed L arm this shift, IV Zosyn.

## 2017-09-22 NOTE — PLAN OF CARE
Problem: Goal Outcome Summary  Goal: Goal Outcome Summary  Discharge Planner SLP   Patient plan for discharge: Return home living independently  Current status: SLP: Pt presents with severe dysphagia on video swallow study. Deficits include: oral management difficulty with reduced bolus propulsion, reduced tongue base retraction, no epiglottic movement, and reduced pharyngeal contraction. These deficits resulted in: delayed swallow initation with pooling to pyriforms, silent aspiration with initial swallow of thin liquids via cup and additional aspiration on residue, silent aspiration with nectar thick liquids and silent aspiration with signficant residue with puree textures. Pt minimally responsive to cues for swallow strong, cough, swallow again. Strategy minimally cleared residue. No penetration or aspiration with 1/2 tsp of thin liquids via teaspoon initially, however, moderate residue noted that puts pt at a high risk of aspiration with subsequent trials. Recommended: strict NPO with lightly moistended swab and alternative feeding method. Attempted to educate pt on results, however, he became agitated and threatened to spit at clinician. MD and RN educated on recommendations. If pt would like to continue PO diet despite risks, would recommend 1/2 tsp of clear liquid diet with use of swallow, cough, swallow with every sip.   Barriers to return to prior living situation: Severe dysphagia;  Recommendations for discharge: TCU  Rationale for recommendations: ST did not educate pt today due to agitation; MD notified; ST to follow for further education on video swallow study results and recommendations. If pt is agreeable to continued ST, recommended TCU with aggressive ST to target pharyngeal strengthening and PO readiness       Entered by: Barb Mills 09/22/2017 10:55 AM

## 2017-09-23 NOTE — PLAN OF CARE
Problem: Goal Outcome Summary  Goal: Goal Outcome Summary  Outcome: No Change  A&O x4 with occasional forgetfulness, flat and withdrawn affect. Denies pain, titrated from 3 to 2.5 L O2 NC with humidity sating 96%. Infrequent cough, yellow thin sputum.  Lung sounds diminished, course. Coccyx pressure wound covered in mepilex. Abrasions on mid back, pink, covered in mepilex. Back dressing changed on shift. Voiding well in urinal. Bowel sounds present, large BM 9/21. NPO except meds. Will be getting feeding tube 9/23. L PIV infusing NS at 50 mL/hr. T&R every 2 hours. Did not get out of bed on shift.

## 2017-09-23 NOTE — PROGRESS NOTES
Nebs given as ordered. LS are diminished t/o and pt is on 2L. Will continue to follow.  Bjorn Damon

## 2017-09-23 NOTE — PLAN OF CARE
Problem: Goal Outcome Summary  Goal: Goal Outcome Summary  SLP-  Spoke with Dr. Linda regarding POC for this patient and he asked that SLP follow peripherally until Monday when a care conference will take place.  Pt scheduled to have surgery today for PEG tube, but patient is also saying that he would rather not have the surgery.  MD aware and will be discussing POC with patient this afternoon.  MD knows pt very well and acknowledges that SLP has completed evaluations and attempted treatment but might be best to now have SLP wait to see pt until Monday.  Will schedule pt for Monday visit.

## 2017-09-23 NOTE — PLAN OF CARE
"Problem: Goal Outcome Summary  Goal: Goal Outcome Summary  OT attempted.  Patient requesting ice chips, reminded patient is is NPO for surgery today.  Patient stating he wants surgery canceled, where is the doctor to cancel it, when can he have ice chips, can he talk to the SW, \"I'm being tortured in here\".  Patient reassured by therapist and encouraged to participate in therapy to improve strength, but patient still refusing therapy today.        "

## 2017-09-23 NOTE — PROGRESS NOTES
MD Notification:    Person notified: Hospitalist    Person Name: Dr. Linda    Date/Time: 09/23/17, at about noon    Interaction: Pager and face to face    Purpose of Notification: Bradycardic: HR LOW 40's    Orders Received: No need for interventions per Md order.     Comments:

## 2017-09-23 NOTE — PLAN OF CARE
Problem: Pneumonia (Adult)  Intervention: Maximize Oxygenation/Ventilation/Perfusion  A&O x4 with occasional forgetfulness, flat and withdrawn affect. VSS. Denies pain.  2.5 L O2 with humidity sating 96%.  Bradicardic while sleeping: md notified. No action needed, per md order. Infrequent cough, yellow thin sputum.  Lung sounds diminished, course. Coccyx pressure wound covered in mepilex. Abrasions on mid back, pink, covered in mepilex. Back dressing CDI. Voiding well in urinal. Bowel sounds present, No BM this shift. NPO except meds. Pt refused feeding tube 9/23. L PIV infusing NS at 50 mL/hr. T&R every 2 hours. Switched to comfort care at end of the shift today.

## 2017-09-23 NOTE — PLAN OF CARE
Problem: Goal Outcome Summary  Goal: Goal Outcome Summary  Outcome: No Change     Shift Update: Patient is A&Ox4. Denies pain, on 3L O2 NC with humidity sating 95%. VSS. L PIV infusing 50cc NS, IV Zosyn. Up with walker, GB, and heavy Assist x 2. Changed plan today and wants to get feeding tube placed tomorrow. NPO except meds. Plan: Would like to DC on Monday to rehab facility and then go home to be cared for there.

## 2017-09-23 NOTE — PROGRESS NOTES
[facility]    Hospitalist Progress Note    Date of Service (when I saw the patient): 09/23/2017    Assessment & Plan   Mr. 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 His care has been transitioned to primarily comfort based measures and treatments should not be escalated if he gets sicker    1) Acute hypoxemic respiratory failure and severe sepsis due to left lower lobe aspiration pneumonia and COPD exacerbation: Mr. Noguera appears markedly frail and cachectic though he denies recent weight loss to me (see H and P for documentation in fact of over 20 pound weight loss since 2014). Reportedly a papillary bladder mass had been diagnosed in 2016 but he was unable to make appointment for TURBT. On this admission, he presents for progressive weakness, dyspnea and cough. He was in marked respiratory distress at admission with hypotension, and had leukocytosis, elevated Lactate, and elevated Pro-calcitonin with large left lower lobe infiltrate. UA was essentially negative. He was started on Ceftriaxone and Azithromycin.     Since admission he has had ongoing respiratory distress and initially needed high flow oxygen; blood pressures remain  systolic. He has not tolerated a BIPAP trial. WBC has risen further since admission to 25 k.    He likely has ongoing severe sepsis due to aspiration pneumonia triggering COPD exacerbation and ARDS. We started steroids and broadened antibiotics on 9/20 and since then he has been doing a little better.    -- He has completed 5 days of Azithromycin; we broadened his antibiotics from Ceftriaxone to Zosyn on 9/20 to cover for possible pseudomonas for which he may be at increased risk; we will continue that today at his  "request    -- Will continue inhaled steroid and Spiriva; on 9/20 we added standing nebulizers and systemic IV steroids 125 mg IV Methylprednisolone, all of which will be continued today at his request    -- Otherwise however he is adamant that he does not want a feeding tube, TCU placement or any other invasive measures done.     -- He has failed his swallow study and by video swallow study he essentially aspirates everything. He has severe hypoalbuminemia and likely severe protein calorie malnutrition; at his request he will eat and drink for comfort.    -- Sputum cultures reveal only Candida; this was discussed with ID and that is felt to be a contaminant    -- Blood cultures from 9/20 show no growth to date    -- Plan for transition to hospice care on Monday     2) Disposition: I have discussed with Mr. Noguera in extensive detail that all foods and liquids are being aspirated by him at the present time. I explained that this dysphagia is unlikely to be cured at this point. I told him the dysphagia could be related to underlying malignancy which has caused him to become extremely frail and cachectic. I explained that one treatment course at this point would involve feeding tube placement and TCU discharge to try to get stronger, to follow up with Urology and see if any treatment such as surgery could be performed for his bladder tumor; he expressed that he did not wish to pursue that course. I further reiterated that the next episode of pneumonia will likely happen soon and he said that he would want to be kept comfortable at that point, and not be resuscitated or intubated if he got sicker. I asked what his most pressing concern is at the present time and he said \"to drink water.\" Thus he will be DNR DNI and diet advanced for comfort. He also added that he wants to \"continue the medicine\" including current antibiotics and steroids and I assured him we would do so for now for a short total course of 5-7 days. " Palliative care consulted and case discussed again today with his nephew who is in process of becoming his POA and in agreement with this care plan, adding that his uncle has always been reclusive and fiercely independent.     -- DNR DNI, and he will try to eat and drink for comfort and understands he may die if he does this    -- Palliative Care consulted. Likely plan will be to transition to SNF on Monday or Tuesday, provided he can afford hospice enrollment there    3) NSTEMI and acute systolic CHF exacerbation: Cardiology was consulted. Likely due to demand ischemia from sepsis as well as suspected underlying undiagnosed CAD. He has had leg swelling and orthopnea here with elevated Troponin. TTE showed EF 30-35% with inferior and inferoapical akinesis.     -- He has been on Carvedilol which is continued for now.    -- Diuresis has exacerbated his hypotension so this has been stopped. IV fluid started overnight cautiously for oliguria which is stopped now     3) ROBINSON: ATN due to sepsis suspected; improved; we have stopped vital sign checks and blood draws at this point    4) Suspected Bladder Cancer: Urology consulted, guidance appreciated; further evaluation would need to be as an outpatient and likely will not happen at this point    5) 7 cm AAA: Seen incidentally on imaging; Vascular Medicine consulted. Further treatment deferred.    DVT Prophylaxis: Enoxaparin  Code Status: DNR/DNI    Disposition: As above    Jag Linda MD    Interval History   Angry today. Wants to eat and drink; had initially changed his mind last night and requested a feeding tube but returns to his original decisions to eat and drink for comfort and go on hospice today. No other new complaints.    -Data reviewed today: I reviewed all new labs and imaging results over the last 24 hours. I personally reviewed no images or EKG's today.    Physical Exam   Temp: 95  F (35  C) Temp src: Axillary BP: 109/66 Pulse: 63 Heart Rate: 58  Resp: 16 SpO2: 95 % O2 Device: Nasal cannula Oxygen Delivery: 3 LPM  Vitals:    09/22/17 0658 09/23/17 0536   Weight: 65 kg (143 lb 4.8 oz) 65.5 kg (144 lb 6.4 oz)     Vital Signs with Ranges  Temp:  [95  F (35  C)-96.1  F (35.6  C)] 95  F (35  C)  Pulse:  [63] 63  Heart Rate:  [58-88] 58  Resp:  [16-20] 16  BP: ()/(55-66) 109/66  SpO2:  [95 %-97 %] 95 %  I/O last 3 completed shifts:  In: 757 [I.V.:757]  Out: 800 [Urine:800]    Constitutional: Alert and oriented to person, place and time; NAD now; remains extremely cachectic and frail  HEENT: normocephalic, very dry mucus membranes  Respiratory: lungs remain CTAF B now  Cardiovascular: regular S1 S2   GI: abdomen soft non tender non distended bowel sounds positive  Skin: no rash, good turgor  Musculoskeletal: no clubbing, cyanosis or edema  Neuro: EOMI; moves all four extremities  Psych: Appropriate affect, speech non-tangential      Medications     - MEDICATION INSTRUCTIONS -         nicotine   Transdermal Q8H     nicotine   Transdermal Daily     nicotine  1 patch Transdermal Daily     piperacillin-tazobactam  4.5 g Intravenous Q6H     sodium chloride (PF)  3 mL Intracatheter Q8H     influenza Vac Split High-Dose  0.5 mL Intramuscular Prior to discharge     albuterol  3 mL Nebulization Q4H While awake     methylPREDNISolone  125 mg Intravenous Q12H     carvedilol  3.125 mg Oral BID w/meals     sodium chloride (PF)  10 mL Intracatheter Once     aspirin EC  81 mg Oral Daily     atorvastatin  20 mg Oral Daily     fluticasone furoate  1 puff Inhalation Daily     tiotropium  18 mcg Inhalation Daily     tamsulosin  0.4 mg Oral Weekly       Data     Recent Labs  Lab 09/22/17  0717 09/21/17  0510 09/20/17  1650 09/20/17  0845  09/19/17  0310  09/17/17  1214 09/17/17  0617 09/17/17  0340   WBC 24.3* 17.0*  --  24.7*  --   --   --   --   --  19.4*   HGB 14.2 14.7  --  13.6  --   --   --   --   --  15.6   MCV 97 97  --  97  --   --   --   --   --  95    018  --   290  < >  --   --   --   --  249    140  --   --   --   --   --   --   --  139   POTASSIUM 3.8 3.9 3.2*  --   --   --   < >  --   --  3.4   CHLORIDE 100 101  --   --   --   --   --   --   --  107   CO2 34* 33*  --   --   --   --   --   --   --  24   BUN 52* 38*  --   --   --   --   --   --   --  50*   CR 0.76 0.72  --   --   --  0.66  --   --   --  0.94   ANIONGAP 7 6  --   --   --   --   --   --   --  8   LETHA 8.0* 7.8*  --   --   --   --   --   --   --  8.2*   * 148*  --   --   --   --   --   --   --  146*   ALBUMIN  --  1.4*  --   --   --   --   --   --   --  1.6*   PROTTOTAL  --  5.4*  --   --   --   --   --   --   --   --    BILITOTAL  --  0.4  --   --   --   --   --   --   --   --    ALKPHOS  --  126  --   --   --   --   --   --   --   --    ALT  --  29  --   --   --   --   --   --   --   --    AST  --  35  --   --   --   --   --   --   --   --    TROPI  --   --   --   --   --   --   --  0.567* 0.972* 0.470*   < > = values in this interval not displayed.    No results found for this or any previous visit (from the past 24 hour(s)).

## 2017-09-23 NOTE — PROGRESS NOTES
MD Notification    Person notified: Dr. Linda    Person Name: Nicole Delvalle RN    Date/Time: 9/22/17@1715    Purpose of Notification: To inform MD that patient would like a feeding tube placed this weekend.    Orders Received: Order to change diet to NPO except meds and Dr. Linda will enter order for feeding tube tomorrow morning (9/23).

## 2017-09-24 NOTE — PLAN OF CARE
Problem: Goal Outcome Summary  Goal: Goal Outcome Summary  Physical Therapy Discharge Summary     Reason for therapy discharge:    Change in medical status.     Progress towards therapy goal(s). See goals on Care Plan in [facility] electronic health record for goal details.  Goals not met.  Barriers to achieving goals:   transitioned to comfort care.     Therapy recommendation(s):    No further therapy is recommended.

## 2017-09-24 NOTE — PLAN OF CARE
Problem: Goal Outcome Summary  Goal: Goal Outcome Summary  Outcome: No Change  A&Ox4, flat, withdrawn. Forgetful at times. Denies pain. O2 @ 2.5LPM. VS deferred for comfort cares. Cough noted with thin yellow sputum. Offered turning/repo q2h at times pt refused, reviewed importance with pt. Coccyx with mepilex, back abrasions with mepilex, nose with dressing for protection. BLE alec. BUE bruising around elbows.  Plans to d/c on hospice Mon/Tues.

## 2017-09-24 NOTE — PLAN OF CARE
Problem: Goal Outcome Summary  Goal: Goal Outcome Summary  Speech Language Therapy Discharge Summary     Reason for therapy discharge:    No further expectations of functional progress.     Progress towards therapy goal(s). See goals on Care Plan in [facility] electronic health record for goal details.  Goals not met.  Barriers to achieving goals:   Patient is comfort care..     Therapy recommendation(s):    No further therapy is recommended.

## 2017-09-24 NOTE — PLAN OF CARE
Problem: Goal Outcome Summary  Goal: Goal Outcome Summary  Occupational Therapy Discharge Summary     Reason for therapy discharge:    Change in medical status.     Progress towards therapy goal(s). See goals on Care Plan in [facility] electronic health record for goal details.  Goals not met.  Barriers to achieving goals:   Patient has transitioned to comfort care.     Therapy recommendation(s):    No further therapy is recommended.

## 2017-09-24 NOTE — PLAN OF CARE
Problem: Goal Outcome Summary  Goal: Goal Outcome Summary  Outcome: Change based on patient need/priority Date Met:  09/24/17  Pt is on comfort cares. Denies pain. Failed swallow study, eating is for comfort. Has IS as pt requested lung exercises. Has nicotine gum ordered. Still on O2 3 L NC. Voids using urinal. CAlls appropriately. Refused to get out of bed, repositioned per pt.

## 2017-09-24 NOTE — PROGRESS NOTES
[facility]    Hospitalist Progress Note    Date of Service (when I saw the patient): 09/24/2017    Assessment & Plan   Mr. 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 His care has been transitioned to comfort based measures and treatments should not be escalated if he gets sicker.    1) Acute hypoxemic respiratory failure and severe sepsis due to left lower lobe aspiration pneumonia and COPD exacerbation: Mr. Noguera appears markedly frail and cachectic though he denies recent weight loss to me (see H and P for documentation in fact of over 20 pound weight loss since 2014). Reportedly a papillary bladder mass had been diagnosed in 2016 but he was unable to make appointment for TURBT. His nephew adds that Mr. Noguera seems to have recorded daily episodes of gross hematuria in a log book at home for the past several weeks. On this admission, he presents for progressive weakness, dyspnea and cough.  He was in marked respiratory distress at admission with hypotension, and had leukocytosis, elevated Lactate, and elevated Pro-calcitonin with large left lower lobe infiltrate. UA was essentially negative. He was started on Ceftriaxone and Azithromycin.     Since admission he has had ongoing respiratory distress and initially needed high flow oxygen; blood pressures have been  systolic. He has not tolerated a BIPAP trial. WBC has risen further since admission to 25 k.    He likely has ongoing severe sepsis due to aspiration pneumonia triggering COPD exacerbation and ARDS. We started steroids and broadened antibiotics on 9/20 and since then he has been doing a little better.    -- He has completed 5 days of Azithromycin; we broadened his antibiotics from  Ceftriaxone to Zosyn on 9/20 to cover for possible pseudomonas for which he may be at increased risk; today he will finish a 5 day course of treatment with Zosyn; we will not resume antibiotics in future as he is being transitioned to comfort cares    -- Will continue inhaled steroid and Spiriva; on 9/20 we added standing nebulizers and systemic IV steroids 125 mg IV Methylprednisolone; will continue inhalers and nebs for comfort, but stop steroids today    -- Otherwise he is adamant that he does not want a feeding tube, TCU placement or any other invasive measures done.     -- He has failed his swallow study and by video swallow study he essentially aspirates everything. He has severe hypoalbuminemia and likely severe protein calorie malnutrition; at his request he will eat and drink for comfort.    -- Sputum cultures reveal only Candida; this was discussed with ID and that is felt to be a contaminant    -- Blood cultures from 9/20 show no growth to date    -- Oxygen for comfort only    -- Plan for transition to hospice care on Monday pending further care discussions with him, his nephew and the Palliative care team at 10 AM; discharge would be to home with hospice if possible, otherwise a SNF will be needed    2) Disposition: I have discussed with Mr. Noguera in extensive detail that all foods and liquids are being aspirated by him at the present time. I explained that this dysphagia is unlikely to be cured at this point. I told him the dysphagia could be related to underlying malignancy which has caused him to become extremely frail and cachectic. I explained that one treatment course at this point would involve feeding tube placement and TCU discharge to try to get stronger, to follow up with Urology and see if any treatment such as surgery could be performed for his bladder tumor; he expressed that he did not wish to pursue that course. I further reiterated that the next episode of pneumonia will likely happen  "soon and he said that he would want to be kept comfortable at that point, and not be resuscitated or intubated if he got sicker. I asked what his most pressing concern is at the present time and he said \"to drink water.\" Thus he will be DNR DNI and diet advanced for comfort. He also added that he wants to \"continue the medicine\" including current antibiotics and steroids and I assured him we would do so for now for a short total course of 5-7 days. Palliative care consulted.    The case has been discussed as well with his nephew, who is in process of becoming his POA and in agreement with this care plan, adding that his uncle has always been reclusive, very intelligent, brusque in manner, and fiercely independent. His nephew states that he hasn't seen his uncle for over 30 years prior to this hospitalization; he adds that there are no other close living relatives or friends. Mr. Noguera has agreed to let his nephew become his POA.     -- DNR DNI, and he will try to eat and drink for comfort and understands he may die if he does this    --  Palliative Care consulted. Likely plan will be to transition to SNF on Monday or Tuesday, provided he can afford hospice enrollment there (his nephew says that with his uncle's permission he has looked now at Mr. Noguera's finances and thinks hospice enrollment [facility] may be difficult financially)    3) NSTEMI and acute systolic CHF exacerbation: Cardiology was consulted. Likely due to demand ischemia from sepsis as well as suspected underlying undiagnosed CAD. He has had leg swelling and orthopnea here with elevated Troponin. TTE showed EF 30-35% with inferior and inferoapical akinesis.     -- He has been on Carvedilol which is continued for now.    -- Diuresis has exacerbated his hypotension so this has been stopped. IV fluid started overnight cautiously for oliguria which is stopped now     3) ROBINSON: ATN due to sepsis suspected; improved; we have stopped vital sign checks and " blood draws at this point    4) Suspected Bladder Cancer: Urology consulted, guidance appreciated; further evaluation would need to be as an outpatient and likely will not happen at this point    5) 7 cm AAA: Seen incidentally on imaging; Vascular Medicine consulted. Further treatment deferred.    DVT Prophylaxis: Enoxaparin  Code Status: DNR/DNI    Disposition: As above    Jag Linda MD    Interval History   Cough and dyspnea are better though still persist. Wants some nicotine gum instead of the patch. No other new complaints.    -Data reviewed today: I reviewed all new labs and imaging results over the last 24 hours. I personally reviewed no images or EKG's today.    Physical Exam   Temp: 95.9  F (35.5  C) Temp src: Oral BP: 110/62 Pulse: 71 Heart Rate: 70 Resp: 16 SpO2: 91 % O2 Device: Nasal cannula Oxygen Delivery: 3 LPM  Vitals:    09/22/17 0658 09/23/17 0536   Weight: 65 kg (143 lb 4.8 oz) 65.5 kg (144 lb 6.4 oz)     Vital Signs with Ranges  Temp:  [95  F (35  C)-97  F (36.1  C)] 95.9  F (35.5  C)  Pulse:  [71] 71  Heart Rate:  [58-70] 70  Resp:  [16] 16  BP: (106-125)/(57-66) 110/62  SpO2:  [89 %-100 %] 91 %  I/O last 3 completed shifts:  In: 350 [I.V.:350]  Out: 775 [Urine:775]    Constitutional: Alert and oriented to person, place and time; NAD now; remains extremely cachectic and frail  HEENT: normocephalic, very dry mucus membranes  Respiratory: lungs remain CTAF B now  Cardiovascular: regular S1 S2   GI: abdomen soft non tender non distended bowel sounds positive  Skin: no rash, good turgor  Musculoskeletal: no clubbing, cyanosis or edema  Neuro: EOMI; moves all four extremities  Psych: Appropriate affect, speech non-tangential      Medications     - MEDICATION INSTRUCTIONS -         piperacillin-tazobactam  4.5 g Intravenous Q6H     sodium chloride (PF)  3 mL Intracatheter Q8H     influenza Vac Split High-Dose  0.5 mL Intramuscular Prior to discharge     albuterol  3 mL Nebulization Q4H  While awake     sodium chloride (PF)  10 mL Intracatheter Once     fluticasone furoate  1 puff Inhalation Daily     tiotropium  18 mcg Inhalation Daily     tamsulosin  0.4 mg Oral Weekly       Data     Recent Labs  Lab 09/22/17  0717 09/21/17  0510 09/20/17  1650 09/20/17  0845  09/19/17  0310  09/17/17  1214   WBC 24.3* 17.0*  --  24.7*  --   --   --   --    HGB 14.2 14.7  --  13.6  --   --   --   --    MCV 97 97  --  97  --   --   --   --     315  --  290  < >  --   --   --     140  --   --   --   --   --   --    POTASSIUM 3.8 3.9 3.2*  --   --   --   < >  --    CHLORIDE 100 101  --   --   --   --   --   --    CO2 34* 33*  --   --   --   --   --   --    BUN 52* 38*  --   --   --   --   --   --    CR 0.76 0.72  --   --   --  0.66  --   --    ANIONGAP 7 6  --   --   --   --   --   --    LETHA 8.0* 7.8*  --   --   --   --   --   --    * 148*  --   --   --   --   --   --    ALBUMIN  --  1.4*  --   --   --   --   --   --    PROTTOTAL  --  5.4*  --   --   --   --   --   --    BILITOTAL  --  0.4  --   --   --   --   --   --    ALKPHOS  --  126  --   --   --   --   --   --    ALT  --  29  --   --   --   --   --   --    AST  --  35  --   --   --   --   --   --    TROPI  --   --   --   --   --   --   --  0.567*   < > = values in this interval not displayed.    No results found for this or any previous visit (from the past 24 hour(s)).

## 2017-09-24 NOTE — PLAN OF CARE
Problem: Goal Outcome Summary  Goal: Goal Outcome Summary  Outcome: No Change     Shift Update: Patient is A&Ox4. Is flat and withdrawn. Can be forgetful at times. No complaints of pain. 2.5 L O2 with humidity sating 96%. Infrequent cough, yellow thin sputum.  Lung sounds diminished. Coccyx pressure wound covered in mepilex. Abrasions on mid back, pink, covered in mepilex. Back dressing C/D/I. Using urinal to void. Bowel sounds present. Large BM today. Pt refused feeding tube today (9/23) and is currently comfort cares. Normal Saline discontinued. Receiving IV Zosyn. T&R every 2 hours. Plan: Discharge pending.

## 2017-09-25 NOTE — PROGRESS NOTES
[facility]    Hospitalist Progress Note    Assessment & Plan   Summary: Bong Noguera is a 82 year old male with PMH COPD, suspected bladder cancer, BPH who was admitted on 9/15/2017 with acute hypoxic respiratory failure and severe sepsis. FTH elevated troponins and possible acute systolic CHF exacerbation. Transitioned to comfort cares.    Goals of care: During this admission patient has refused PEG placement and has indicated that he prefers comfort cares measures. He has refused PT/OT evaluation and has not been fully evaluated for TCU. Has failed swallow study with significant aspiration risk. Patient has acknowledged his aspiration risk and has decided to continue eating. Nephew is POA and has been helping patient with this transition process.  - O2 for comfort  - Palliative consult  - Discontinued IV abx at discharge    Acute hypoxemic respiratory failure  LLL PNA  COPD exacerbation  On admission noted hypotension, elevated lactate, elevated procalcitonin with large LLL infiltrate, and increased O2 reqs on HF. Noted WBC increase on 9/22 to 24.3k. Treated with Ceftriaxone and Azithromycin, and IV steroids. On 9/25 patient appears symptomatically improved however no further evaluations given comfort cares approach. Sputum cx with Candida, likely contaminant, Bcx NGTD.  - Will continue Zosyn while inpatient for 10 day course; no antibiotics at discharge  - Continue Fluticasone and Spiriva, no further steroids    Demand ischemia, resolved: Elevated troponins in the setting of sepsis. TTE showed EF 30-35% and inferoapical akinesis which is new. Cardiology consulted and have recommended medication management.  - Continue carvedilol    ROBINSON, improved.  AAA 7cm: Further treatment deferred  Bladder mass: Dx in 2016 but reportedly unable to make appt for TURBT. Hematuria was reported at home.  - Cont Tamsulosin    DVT Prophylaxis: Pneumatic Compression Devices  Code Status: DNR/DNI  PT/OT:  Refused    Disposition: [facility]    David Olivarez MD  Text Page  (7am to 6pm)  Interval History   Patient feeling well today. Denies SOB. Feels stronger overall as compared to before. No acute issues.    -Data reviewed today: I reviewed all new labs and imaging results over the last 24 hours. I personally reviewed the chest x-ray image(s) showing and CT showing left PNA. Barium swallow showing aspiration.    Physical Exam               SpO2: 92 % O2 Device: Nasal cannula with humidification Oxygen Delivery: 3 LPM  Vitals:    09/22/17 0658 09/23/17 0536   Weight: 65 kg (143 lb 4.8 oz) 65.5 kg (144 lb 6.4 oz)     Vital Signs with Ranges  SpO2:  [92 %-94 %] 92 %  I/O last 3 completed shifts:  In: -   Out: 375 [Urine:375]  O2 requirements: for comfort    Constitutional: Cachectic male in NAD  HEENT: Normocephalic, atraumatic, eyes nonicteric, oral mucosa moist  Cardiovascular: RRR, normal S1/2, no m/r/g  Respiratory: Decreased breath sounds throughout, appears relatively clear  Vascular: PIVs noted, 1+ BLE edema, noted distal pedal pulses  GI: No organomegaly, normoactive bowel sounds, nontender, nondistended  Skin/Integumen: No rashes or scars  Neuro/Psych: Appropriate affect and mood. A&Ox3, moves all extremities    Medications     - MEDICATION INSTRUCTIONS -         sodium chloride (PF)  3 mL Intracatheter Q8H     influenza Vac Split High-Dose  0.5 mL Intramuscular Prior to discharge     albuterol  3 mL Nebulization Q4H While awake     sodium chloride (PF)  10 mL Intracatheter Once     fluticasone furoate  1 puff Inhalation Daily     tiotropium  18 mcg Inhalation Daily     tamsulosin  0.4 mg Oral Weekly       Data     Recent Labs  Lab 09/22/17  0717 09/21/17  0510 09/20/17  1650 09/20/17  0845  09/19/17  0310   WBC 24.3* 17.0*  --  24.7*  --   --    HGB 14.2 14.7  --  13.6  --   --    MCV 97 97  --  97  --   --     315  --  290  < >  --     140  --    --   --   --    POTASSIUM 3.8 3.9 3.2*  --   --   --    CHLORIDE 100 101  --   --   --   --    CO2 34* 33*  --   --   --   --    BUN 52* 38*  --   --   --   --    CR 0.76 0.72  --   --   --  0.66   ANIONGAP 7 6  --   --   --   --    LEHTA 8.0* 7.8*  --   --   --   --    * 148*  --   --   --   --    ALBUMIN  --  1.4*  --   --   --   --    PROTTOTAL  --  5.4*  --   --   --   --    BILITOTAL  --  0.4  --   --   --   --    ALKPHOS  --  126  --   --   --   --    ALT  --  29  --   --   --   --    AST  --  35  --   --   --   --    < > = values in this interval not displayed.    Imaging:   No results found for this or any previous visit (from the past 24 hour(s)).

## 2017-09-25 NOTE — PROGRESS NOTES
"JEFF was updated that patient's nephew would like  to check [facility] benefit for hospice. JEFF called [facility] at the [facility], and patient does not currently have the hospice benefit but can apply for it by filling out [id number] form. JEFF provided form with patient's nephew who will plan to fill it out and give to SW. JEFF will need to fax [id number] form and  d/c paperwork to [phone], Attn: Mirta, Hospice. Process takes a few days. JEFF gave alternate option to send patient to [facility] contracted facility for few days (private pay) and then if patient gets coverage it will cover from that point on. Nephew stated \"I don't know if he has any money at all.\"    P: SW will continue to follow and assist as needed.    ADDENDUM  I: JEFF faxed VA application.    ROSALEE Su   *[phone]      "

## 2017-09-25 NOTE — PLAN OF CARE
Problem: Goal Outcome Summary  Goal: Goal Outcome Summary  Outcome: Adequate for Discharge Date Met:  09/25/17  Shift Update: Moved to private room for comfort cares, no vitals, waiting to [facility] approved SNF bed for hsopice. Up w heavy assist 2, incontinent at times, forgetfult at times, calls for need, gum at bedside, chews constantly d/t chronic dry mouth. IV SL

## 2017-09-25 NOTE — PROGRESS NOTES
Chart reviewed  Note pt is now on comfort cares    Diet: Regular (for comfort)  He is being seen daily for meal ordering assistance (ordering 3 meals per day - likes desserts)     Will be available if needed    Katherine Garcia RD, LD  Clinical Dietitian - [facility]  Pager - [phone]

## 2017-09-25 NOTE — PLAN OF CARE
Problem: Goal Outcome Summary  Goal: Goal Outcome Summary  Outcome: No Change     Shift Update: Patient is on comfort cares. No complaints of pain. Calls appropriately. Infrequent cough, yellow thin sputum. Failed swallow study, eating is for comfort. Has IS as pt requested lung exercises. Has nicotine gum ordered. 3L O2 NC with humidity sating 96%. Voids using urinal. Refused to get out of bed, repositioned per pt. Plan: Discharge pending.

## 2017-09-25 NOTE — CONSULTS
Met with pt and his nephew Brooks to discuss the hospice benefit and philosophy.  Reviewed that hospice does not provide 24 hour care.  Medications, equipment and services were discussed.  Provided bridge sheet with hospice info to [facility] per pt request.  Brooks states they will need placement at a nursing facility, as moving home with 24 hr help does not seem like a workable option.  Brooks also asked to speak to MD regarding plan of care.  Discussed meeting with JEFF Merchant and JUAREZ Olsen RN.  Shonda to follow up with family about placement options and nurse Tiesha to pat GONZALEZ for family.  No consent signed as discharge plan is not known at this time.  Please contact [facility] when plans are known so discharge can be coordinated.  Thank you for this consult.      [phone]

## 2017-09-25 NOTE — PLAN OF CARE
Problem: Goal Outcome Summary  Goal: Goal Outcome Summary  Outcome: No Change  Patient is on comfort cares. Flat affect with occasional frustration. No pain overnight, calls appropriately however at times incontinent. Pt is strong assist of 2-3 up to bathroom. Incontinent of bowel x1 overnight. Voids using urinal. Infrequent cough, yellow thin sputum. Regular diet for comfort, pt failed swallow study. IS available for lung exercises per patient request. Has nicotine gum ordered. 3L O2 NC with humidity sating in mid 90s. Attempted repositioning q2h as patient allowed, at times refusing. New mepilex applied to coccyx overnight. L PIV SL. Plan to discharge today or tomorrow to SNF on hospice. Continue to monitor.

## 2017-09-26 NOTE — PROGRESS NOTES
JEFF  I: JEFF was updated by Mirta with the [facility] that patient needs to include income and d/c paperwork from . JEFF discussed income with patient who updated JEFF that he does receive income from reverse mortgage $1,000 and social security $1,000. JEFF updated form and sent to [facility]. SW will discuss getting d/c paperwork with nephew.     P: JEFF will continue to follow and assist as needed.    JEFF printed off request form for  form (discharge paperwork) and called Nephew. JEFF left message requesting he call SW back or stop up at RN station to fill out document. JEFF will fax request as soon as form is completed. Form is located on patient's chart.    ADDENDUM  I: JEFF met with patient's nephew and he filled out request form for . JEFF called [employer] to request patient's d/c paperwork be sent ASAP due to medical emergency. JEFF faxed request form to [phone] and request the copy be sent to Mirta with [facility]. JEFF will continue to follow up and assist as needed. JEFF is unsure of how long it will take to get d/c paperwork sent to [facility] with stat order.     Shonda Zurita, ROSALEE   *[phone]

## 2017-09-26 NOTE — PROGRESS NOTES
Urology Brief Note:  Dx: Bladder cancer, was previously a no-show for TURBT. Pt was re-scheduled for TURBT 10/3/17 with Dr. Trujillo    Assessment: Pt now going to hospice cares    Plan: Pt is now comfort cares  -We will cancel the TURBT scheduled for next week and sign off      Please contact me with any questions or concerns.     Valeria Rios PA-C  [facility]  Pager:  [phone]  After 4pm and on weekends, please call [phone]

## 2017-09-26 NOTE — PROGRESS NOTES
Brief Palliative Progress note.    Chart reviewed. Pt now on comfort measures and plans being made for discharge with hospice cares. No symptom concerns noted from nursing notes. Palliative Care will sign off at this time. Please do not hesitate to re-consult our team if symptom needs arise.    Vida AVENDANO CNP  Pager: [phone]  Palliative Medicine  September 26, 2017

## 2017-09-26 NOTE — PLAN OF CARE
Problem: Pneumonia (Adult)  Goal: Signs and Symptoms of Listed Potential Problems Will be Absent or Manageable (Pneumonia)  Signs and symptoms of listed potential problems will be absent or manageable by discharge/transition of care (reference Pneumonia (Adult) CPG).   Outcome: No Change  Patient a/o eatting coughing up yellowish material.turned every 2 hrs.no pain.

## 2017-09-26 NOTE — PROGRESS NOTES
[facility]    Hospitalist Progress Note    Assessment & Plan   Summary: Bong Noguera is a 82 year old male with PMH COPD, suspected bladder cancer, BPH who was admitted on 9/15/2017 with acute hypoxic respiratory failure and severe sepsis. FTH elevated troponins and possible acute systolic CHF exacerbation. Transitioned to comfort cares, awaiting [facility] long [facility].    Goals of care: During this admission patient has refused PEG placement, aggressive measures, and has indicated that he prefers comfort cares measures. He has refused PT/OT evaluation and has not been fully evaluated for TCU. Has failed swallow study with significant aspiration risk. Patient has acknowledged his aspiration risk and has decided to continue eating. Nephew is POA and has been helping patient with this transition process.  - Palliative consulted  - O2, morphine, atropine SL for comfort    Acute hypoxemic respiratory failure  LLL PNA  COPD exacerbation  On admission noted hypotension, elevated lactate, elevated procalcitonin with large LLL infiltrate, and increased O2 reqs on HF. Noted WBC increase on 9/22 to 24.3k. Treated with a total 10 day course starting with Ceft/Azithro -> Zosyn, ending 9/24/17. Given IV steroids for possible COPD component. On 9/25 patient appears symptomatically improved however no further evaluations given comfort cares approach. Sputum cx with Candida, likely contaminant, Bcx NGTD.   - Continue Fluticasone and Spiriva    Demand ischemia: Elevated troponins in the setting of sepsis. TTE showed EF 30-35% and inferoapical akinesis which is new. Cardiology consulted and have recommended medication management.  ROBINSON, improved.  AAA 7cm: Further treatment deferred  Bladder mass: Dx in 2016 but reportedly unable to make appt for TURBT. Hematuria was reported at home. Further evaluation stopped due to comfort cares.  - Cont Tamsulosin    DVT Prophylaxis: Pneumatic Compression Devices  Code  Status: DNR/DNI  PT/OT: Refused    Disposition: [facility] to [facility] when able, patient is medically stable    David Olivarez MD  Text Page  (7am to 6pm)  Interval History   Patient feeling well today. Denies SOB. No pain currently. Tongue  Is dry.    -Data reviewed today: I reviewed all new labs and imaging results over the last 24 hours. I personally reviewed the chest x-ray image(s) showing and CT showing left PNA. Barium swallow showing aspiration.    Physical Exam   Temp: 98.5  F (36.9  C)   BP: 106/59   Heart Rate: 79 Resp: 18 SpO2: 96 % O2 Device: Nasal cannula Oxygen Delivery: 2 LPM  Vitals:    09/22/17 0658 09/23/17 0536   Weight: 65 kg (143 lb 4.8 oz) 65.5 kg (144 lb 6.4 oz)     Vital Signs with Ranges  Temp:  [97.7  F (36.5  C)-98.5  F (36.9  C)] 98.5  F (36.9  C)  Heart Rate:  [79-93] 79  Resp:  [16-18] 18  BP: (106-116)/(59-62) 106/59  SpO2:  [92 %-96 %] 96 %  I/O last 3 completed shifts:  In: 350 [P.O.:350]  Out: 600 [Urine:600]  O2 requirements: for comfort    Constitutional: Cachectic male in NAD  HEENT: Normocephalic, atraumatic, eyes nonicteric, oral mucosa moist  Cardiovascular: RRR, normal S1/2, no m/r/g  Respiratory: CTAB  Vascular: PIVs noted, no LE edema noted distal pedal pulses  GI: No organomegaly, normoactive bowel sounds, nontender, nondistended  Skin/Integumen: No rashes or scars  Neuro/Psych: Appropriate affect and mood. A&Ox3, moves all extremities    Medications     - MEDICATION INSTRUCTIONS -         sodium chloride (PF)  3 mL Intracatheter Q8H     influenza Vac Split High-Dose  0.5 mL Intramuscular Prior to discharge     albuterol  3 mL Nebulization Q4H While awake     sodium chloride (PF)  10 mL Intracatheter Once     fluticasone furoate  1 puff Inhalation Daily     tiotropium  18 mcg Inhalation Daily     tamsulosin  0.4 mg Oral Weekly       Data     Recent Labs  Lab 09/22/17  0717 09/21/17  0510 09/20/17  1650 09/20/17  0845   WBC 24.3* 17.0*  --   24.7*   HGB 14.2 14.7  --  13.6   MCV 97 97  --  97    315  --  290    140  --   --    POTASSIUM 3.8 3.9 3.2*  --    CHLORIDE 100 101  --   --    CO2 34* 33*  --   --    BUN 52* 38*  --   --    CR 0.76 0.72  --   --    ANIONGAP 7 6  --   --    LETHA 8.0* 7.8*  --   --    * 148*  --   --    ALBUMIN  --  1.4*  --   --    PROTTOTAL  --  5.4*  --   --    BILITOTAL  --  0.4  --   --    ALKPHOS  --  126  --   --    ALT  --  29  --   --    AST  --  35  --   --        Imaging:   No results found for this or any previous visit (from the past 24 hour(s)).

## 2017-09-26 NOTE — PLAN OF CARE
Problem: Goal Outcome Summary  Goal: Goal Outcome Summary  Outcome: No Change     Shift Update: Patient is A&Ox4, forgetful at times, flat affect. Patient is comfort cares and was moved to private room. Denies pain, 2.5 O2 NC. Voiding well in urinal. Up with Assist x 2-3. Refusing repositioning at times. IV SL. Up with Assist x 2-3. Hospice meeting today and DC to [facility] for hospice pending. Plan: [facility]

## 2017-09-26 NOTE — PLAN OF CARE
Problem: Goal Outcome Summary  Goal: Goal Outcome Summary  Outcome: No Change  VS deferred. C/o pain in back/bottom, repositioned and tylenol given with relief. Productive cough noted, able to clear on own. Using urinal in bed. Incontinent of stool x1. Refusing regular repositioning. Redness noted on coccyx, mepilex replaced d/t being soiled. Plan to d/c when placement found with [facility] approval for hospice.

## 2017-09-27 NOTE — PLAN OF CARE
Problem: Goal Outcome Summary  Goal: Goal Outcome Summary  Outcome: No Change   VS deferred. C/o generalized pain, SL morphine given with some relief. Prn nicorette gum given x1. Repositioned every 2 hours. Productive cough noted. No BM this shift. Voiding in urinal in bed. Reported to be up with 2, gait belt and walker to [facility] on previous shift. Plan to d/c to [facility] approved hospice facility when placement found. SW is following.

## 2017-09-27 NOTE — PROGRESS NOTES
[facility]    Hospitalist Progress Note    Assessment & Plan   Summary: Bong Noguera is a 82 year old male with PMH COPD, suspected bladder cancer, BPH who was admitted on 9/15/2017 with acute hypoxic respiratory failure and severe sepsis. FTH elevated troponins and possible acute systolic CHF exacerbation. Transitioned to comfort cares, awaiting [facility] long term [facility].    Goals of care: During this admission patient has refused PEG placement, aggressive measures, and has indicated that he prefers comfort cares measures. He has refused PT evaluation and has not been fully evaluated for TCU. Has failed swallow study with significant aspiration risk. Patient has acknowledged his aspiration risk and has decided to continue eating. Nephew is POA and has been helping patient with this transition process. Palliative consulted, now signed off.  - O2, morphine, atropine SL for comfort  - Following patient discussion with order OT to assess upper extremity function, as well as psychology given acute stress related to hospitalization    Acute hypoxemic respiratory failure  LLL PNA  COPD exacerbation  On admission noted hypotension, elevated lactate, elevated procalcitonin with large LLL infiltrate, and increased O2 reqs on HF. Noted WBC increase on 9/22 to 24.3k. Treated with a total 10 day course starting with Ceft/Azithro -> Zosyn, ending 9/24/17. Given IV steroids for possible COPD component. On 9/25 patient appears symptomatically improved however no further evaluations given comfort cares approach. Sputum cx with Candida, likely contaminant, Bcx NGTD.   - Continue Fluticasone and Spiriva    Demand ischemia: Elevated troponins in the setting of sepsis. TTE showed EF 30-35% and inferoapical akinesis which is new. Cardiology consulted and have recommended medication management.  ROBINSON, improved.  AAA 7cm: Further treatment deferred  Bladder mass: Dx in 2016 but reportedly unable to make appt for  "TURBT. Hematuria was reported at home. Further evaluation stopped due to comfort cares.  - Cont Tamsulosin    DVT Prophylaxis: Pneumatic Compression Devices  Code Status: DNR/DNI  PT/OT: OT ordered    Disposition: Ex[facility] to [facility] when able, patient is medically stable    David Olivarez MD  Text Page  (7am to 6pm)  Interval History   Patient feeling well today. He endorses feeling trapped in the hospital because he can't walk. He asks, \"can you find someone to kill me\" as a joke. Patient denies SI. Afrin helps.    -Data reviewed today: I reviewed all new labs and imaging results over the last 24 hours. I personally reviewed the chest x-ray image(s) showing and CT showing left PNA. Barium swallow showing aspiration.    Physical Exam             Resp: 18 SpO2: 92 % O2 Device: Nasal cannula Oxygen Delivery: 2 LPM  Vitals:    09/22/17 0658 09/23/17 0536   Weight: 65 kg (143 lb 4.8 oz) 65.5 kg (144 lb 6.4 oz)     Vital Signs with Ranges  Resp:  [18] 18  SpO2:  [91 %-97 %] 92 %  I/O last 3 completed shifts:  In: -   Out: 845 [Urine:845]    Constitutional: Cachectic male in NAD  HEENT: Normocephalic, atraumatic, eyes nonicteric, oral mucosa moist  Skin/Integumen: No rashes or scars  Neuro/Psych: Appropriate affect and mood.    Medications     - MEDICATION INSTRUCTIONS -         oxymetazoline  2 spray Both Nostrils BID     sodium chloride (PF)  3 mL Intracatheter Q8H     influenza Vac Split High-Dose  0.5 mL Intramuscular Prior to discharge     albuterol  3 mL Nebulization Q4H While awake     sodium chloride (PF)  10 mL Intracatheter Once     fluticasone furoate  1 puff Inhalation Daily     tiotropium  18 mcg Inhalation Daily     tamsulosin  0.4 mg Oral Weekly       Data   No more labs or imaging given comfort cares  "

## 2017-09-27 NOTE — PROGRESS NOTES
JEFF  I: JEFF called Mirta with [facility] and left message requesting to know if they received  Form from [employer]. JEFF awaits a Call back.    ADDENDUM  I: JEFF received message from Mirta stating that she has not received  from [employer] yet but will update JEFF when it arrives.     Shonda Zurita, LSW   *[phone]

## 2017-09-27 NOTE — PLAN OF CARE
"Problem: Goal Outcome Summary  Goal: Goal Outcome Summary  OT: Re-orders rec'd for \"upper extremity mobility\" however note that patient has transitioned to comfort care. [facility]. OT eval not appropriate at this time due to patient's status and limited expectation for improvement.       "

## 2017-09-27 NOTE — PLAN OF CARE
Problem: Goal Outcome Summary  Goal: Goal Outcome Summary  Outcome: No Change  VS deferred - comfort care patient. Denies pain. Prn nicorette gum given x1. Repositioned every 2 hours. Productive cough noted. No BM this shift. Voiding per urinal. Bedfast this shift. Coccyx red blanchable, new foam dressing applied - preventative measure.  Plan to d/c to [facility] approved hosp[facility] facility when placement found. SW is following.

## 2017-09-27 NOTE — PLAN OF CARE
Problem: Goal Outcome Summary  Goal: Goal Outcome Summary  Outcome: No Change  Comfort cares. A/O x4. Denies pain. Nicotine gum given x1, effective. Productive cough noted, able to clear on own. Using urinal at bedside, 2 formed BMs this shift, up with 2, gait belt and walker to BSC, incontinent at times.Turned/Repo & oral cares q2h. Redness noted on coccyx, mepilex dressing changed 2 times after BMs. Reg diet, tolerating well. Plan: d/c  to hospice at SNF. Will continue to monitor.

## 2017-09-28 NOTE — CONSULTS
"DATE OF SERVICE:  09/28/2017      REQUESTING PHYSICIAN:  Dr. Lugo.        REASON FOR CONSULTATION:  \"The patient is tired of being in the hospital and appears stressed.\"      IDENTIFYING DATA:  Mr. Bong Noguera is an 82-year-old   male admitted with generalized weakness.  He lives alone and has multiple medical problems, called 911 due to the fact that he has been unable to take care of himself.      CHIEF COMPLAINT:  \"I guess I'm just tired of being in the hospital.\"        HISTORY OF PRESENT ILLNESS:  This is an 82-year-old gentleman who was admitted 09/16/2017 with failure to thrive, weight loss and weakness.  He lives alone, uses a walker and has had very significant weight loss over many years.  He has been short of breath and has significant medical disease including chronic obstructive pulmonary disease, sinus problems, bladder tumor and BPH.  He is currently convalescing on station 66.  He presents as somewhat lethargic, weak and generally despondent.  He is not reporting thoughts of self-harm and denies a history of being treated for depression.  He has significant sleep disruption.  He has no appetite.  He speaks in a very soft voice and physically looks quite debilitated.  He has a number of issues that they are currently trying to remedy, the most significant of which seems to be a bad COPD exacerbation in the context of other problems including his weight loss.  He told the staff that he has been feeling trapped in the hospital because he cannot walk and asked the staff \"to find somebody to kill me.\"  He says it was a joke and denies that he would try to hurt himself.      On further questioning, he denies a history of treatment for depression, hypomania, taqueria, generalized anxiety disorder, panic disorder, obsessive-compulsive disorder, psychosis or other psychiatric disturbance.  He is not currently on any psychotropic medications.  I did have a chance to review his labs and his " general electrolytes have been normal.  He has a markedly elevated WBC, but his liver function tests are normal.  His total protein is quite low at 5.4 along with a low albumin of 1.4, suggesting significant malnutrition.      PAST PSYCHIATRIC HISTORY:  Unremarkable, though he has had failure to thrive picture for quite some time.      PAST CHEMICAL DEPENDENCY HISTORY:  None reported.      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      MEDICATIONS:  Spiriva, Flomax, Afrin, albuterol nebulizer, fluticasone, furoate, Tylenol p.r.n.,   Proventil p.r.n., Biotene, Dulcolax, Atropine ophthalmic solution, Flonase, Robitussin, lidocaine, melatonin, metoprolol, morphine p.r.n., Nicorette, Nitrostat, Zofran, Sudafed, Senokot-S, [place].      FAMILY HISTORY:  He denies mental illness, chemical dependency or suicide.      SOCIAL HISTORY:  I know that he typically lives alone.  He is a retired TV repairman with some college education.  He does not have children and is .  He is currently DNR/DNI.      REVIEW OF SYSTEMS:   A 10-point review of systems is notable for cachexia on constitutional exam and lethargy on his neurologic exam.  The remainder of the 10-point review of systems is negative.  Most recent vital signs show oxygen saturation 91%.  Additional vital signs not currently available.      MENTAL STATUS EXAMINATION:  Appearance:  The patient is a cachectic man with a nasogastric tube in place.  He is unshaven, appears thin and slightly lethargic.  Speech is soft and hypophonic, use of language fair.  Motor exam slowed and withdrawn.  Coordination, station, gait impaired due to weakness.  Muscle strength and tone are diminished.  Affect is flat.  Mood was subjectively depressed.  Thought process is slowed but generally  logical, coherent and goal directed.  No loosening of associations, no flight of ideas.  Thought content negative for current hallucinations, delusions, paranoia, suicidal or homicidal ideation.  He endorses some depressive cognitions and difficulty with sleep and appetite.  He denies specific thoughts of wanting to hurt himself or others, but projects a sense of hopelessness about his medical condition.  Insight and judgment fair.  Cognitive exam, the patient is slightly lethargic, rouses quickly to voice and can attend a conversation.  Concentration is poor.  Recent memory fair.  Remote memory grossly intact.  General fund of knowledge average.      IMPRESSION:  The patient is an 82-year-old gentleman currently DNR/DNI presenting with failure to thrive and multiple medical issues.  He likely has symptoms of a major depressive disorder secondary to his general medical condition.  I think it is reasonable to offer him mirtazapine to target sleep issues and appetite.  This may also help his mood.  We will start with a low dose.  His prognosis is guarded, obviously he is going to need transitional care for rehabilitation once he is stabilized medically.  He does not appear to be an imminent risk to himself.      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   Axis II:  Failure to thrive with weight loss.   Axis III:   COPD  exacerbation.   Axis IV:  Bladder cancer.      PLAN:   1.  Initiate Remeron 7.5 mg each day at bedtime.   2.  Continue medical management as you are.  Be judicious with narcotics and other potentially deliriogenic medications as he is quite deconditioned and high risk for delirium.   3.  We will follow as needed.         IZZY AMADO MD             D: 2017 07:33   T: 2017 07:57   MT:       Name:     KAYLA MADRIGAL   MRN:      [id number]        Account:       [id number]   :      1935           Consult Date:  2017       Document: [id number]

## 2017-09-28 NOTE — PLAN OF CARE
Alert, did not sleep at all overnight. Denied pain. VS & assessments deferred, pt is on comfort cares. Refused turning/repositioning at times. Frequent, productive cough w/yellow sputum. Incontinent of bowel 1x, voiding in urinal. Dressing on coccyx changed, intact. PRN nicotine gum (4mg) given 1x. Continue to monitor.

## 2017-09-28 NOTE — PROGRESS NOTES
JEFF  I: JEFF met with patient's nephew to further discuss discharge planning. Patient's nephew in agreement with patient going to a LTC contracted [facility] facility and privately paying until VA benefits become effective. Discussed [facility] contracted facilities, and patient's nephew would like referrals sent to [facility] due to location. Patient's nephew stating they prefer a facility that accepts a credit card over a check. SW sent referrals per protocol.  P: JEFF will continue to follow.     GENNY Zamora, LGSW

## 2017-09-28 NOTE — PROGRESS NOTES
Patient is on 2 LPM NC and SpO2 low 90`s. Scheduled neb tx given and tolerated well. BBS diminished pre and post. Pt has strong productive cough. Cough up moderate amount of yellow and thick secretions. Will continue to follow.

## 2017-09-28 NOTE — PROGRESS NOTES
Patient is on a 2L NC with SpO2 in the low 90's. BS diminished. All nebs were given as ordered.  Will cont to follow.  9/28/2017  Amy Jara RRT

## 2017-09-28 NOTE — PLAN OF CARE
Problem: Patient Care Overview  Goal: Plan of Care/Patient Progress Review  Outcome: No Change  VS deferred. Denies pain. C/o rhinitis, prn Flonase given x1, effective. Prn nicorette gum given x1. Turned/Repo and oral cars every 2 hours. Productive cough noted. 1 formed BMs this shift. Voiding in urinal in bed. Up with 2, gait belt and walker to BSC. Dressing on coccyx and mid-back CDI. Plan to d/c to [facility] approved hospice facility when placement found. SW is following. Will continue to monitor.

## 2017-09-28 NOTE — PROGRESS NOTES
[facility]    Hospitalist Progress Note    Assessment & Plan   Summary: Bong Noguera is a 82 year old male with PMH COPD, suspected bladder cancer, BPH who was admitted on 9/15/2017 with acute hypoxic respiratory failure and severe sepsis. FTH elevated troponins and possible acute systolic CHF exacerbation. Transitioned to comfort cares, awaiting VA long term care placement.    Goals of care: During this admission patient has refused PEG placement, aggressive measures, and has indicated that he prefers comfort cares measures. He has refused PT evaluation and has not been fully evaluated for TCU. Has failed swallow study with significant aspiration risk. Patient has acknowledged his aspiration risk and has decided to continue eating. Nephew is POA and has been helping patient with this transition process. Palliative consulted, now signed off.  - O2, morphine, atropine SL for comfort  - Mirtazapine for sleep, appetite    Acute hypoxemic respiratory failure  LLL PNA  COPD exacerbation  On admission noted hypotension, elevated lactate, elevated procalcitonin with large LLL infiltrate, and increased O2 reqs on HF. Noted WBC increase on 9/22 to 24.3k. Treated with a total 10 day course starting with Ceft/Azithro -> Zosyn, ending 9/24/17. Given IV steroids for possible COPD component. On 9/25 patient appears symptomatically improved however no further evaluations given comfort cares approach. Sputum cx with Candida, likely contaminant, Bcx NGTD.   - Continue Fluticasone and Spiriva    Chronic/Resolved  Demand ischemia: Elevated troponins in the setting of sepsis. TTE showed EF 30-35% and inferoapical akinesis which is new. Cardiology consulted and have recommended medication management.  ROBINSON, improved.  AAA 7cm: Further treatment deferred  Bladder mass: Dx in 2016 but reportedly unable to make appt for TURBT. Hematuria was reported at home. Further evaluation stopped due to comfort cares.  - Cont  Tamsulosin    DVT Prophylaxis: Pneumatic Compression Devices  Code Status: DNR/DNI  PT/OT: OT ordered    Disposition: Expected discharge to [facility] long term care hospice when able, patient is medically stable    David Olivarez MD  Text Page  (7am to 6pm)  Interval History   Patient sleeping comfortably, did not disturb.    -Data reviewed today: I reviewed all new labs and imaging results over the last 24 hours. I personally reviewed the chest x-ray image(s) showing and CT showing left PNA. Barium swallow showing aspiration.    Physical Exam             Resp: 18 SpO2: 92 % O2 Device: Nasal cannula Oxygen Delivery: 2 LPM  Vitals:    09/22/17 0658 09/23/17 0536   Weight: 65 kg (143 lb 4.8 oz) 65.5 kg (144 lb 6.4 oz)     Vital Signs with Ranges  Resp:  [18-20] 18  SpO2:  [91 %-93 %] 92 %  I/O last 3 completed shifts:  In: -   Out: 900 [Urine:900]    Constitutional: Cachectic male in NAD    Medications     - MEDICATION INSTRUCTIONS -         mirtazapine  7.5 mg Oral At Bedtime     oxymetazoline  2 spray Both Nostrils BID     influenza Vac Split High-Dose  0.5 mL Intramuscular Prior to discharge     albuterol  3 mL Nebulization Q4H While awake     sodium chloride (PF)  10 mL Intracatheter Once     fluticasone furoate  1 puff Inhalation Daily     tiotropium  18 mcg Inhalation Daily     tamsulosin  0.4 mg Oral Weekly       Data   No more labs or imaging given comfort cares

## 2017-09-28 NOTE — PROGRESS NOTES
JEFF  I: JEFF called [employer] to check on status of  Form. Staff stated it will be sent Oct 2nd. JEFF will discuss with patient about private paying until [facility] can get patient coverage or applying for MA. JEFF spoke with Nephew who is open to having patient private pay for LTC at a VA contracted facility until VA hospice benefit is effective. Nephew is planning to meet with JEFF to discuss further around 1200. JEFF called Mirta with the [facility] to update. Mirta was unsure about the process of private paying until VA benefit is effective and transferred SW to [facility]. JEFF left message and awaits a call back to confirm information.    P: JEFF will continue to follow and assist as needed.    Shonda Zurita, MONICAW   *[phone]

## 2017-09-28 NOTE — PLAN OF CARE
Problem: Goal Outcome Summary  Goal: Goal Outcome Summary  Outcome: No Change  Pt is A&Ox4. Denied pain. VS & assessments deferred, pt is on comfort cares. T&RQ2h. Frequent, productive cough w/yellow sputum. Voiding in urinal. Dressing on coccyx changed today, intact. PRN Saline nasal spray given x1. Continue to monitor.

## 2017-09-28 NOTE — PROGRESS NOTES
Pt is on comfort cares and waiting for placement. WOCN spoke to bedside nurse today who reports blanchable redness with open areas and not appropriate to assess at this time. WOCN discussed with bedside nurse appropriate Pressure Prevention Interventions and to notify if area becomes non blanchable or open. Skin not assessed today, will continue to check in with nursing.

## 2017-09-29 NOTE — PROGRESS NOTES
Hospice: Met with pts nephjim Guy to sign the hospice consent forms. The plan is for the pt to be discharged to [facility] with a 6.30pm [facility] Stretcher . I have ordered liquid oxygen from [facility] and Magdiel from [facility] told me the oxygen would be able to be delivered by 6:30pm to the facility. Patient may need an xtra long bed and that will be determined tomorrow when the  Hospice team completes the admission at the facility at 1p. Brooks was given copies of his signed hospice consent forms along with the hospice handbook with the 24 hr number. Hospice discharge meds have been filled. POLST completed but will need the hospice medical director signature. The hospice poc and meds have been reviewed with Annie MENDEZ Np. Thank you.Donna Bishop RN, BSN   Hospice Admission nurse  [phone]

## 2017-09-29 NOTE — DISCHARGE SUMMARY
[facility]    Discharge Summary  Hospitalist    Date of Admission:  9/15/2017  Date of Discharge:  9/29/2017  Discharging Provider: David Olivarez MD    Discharge Diagnoses   Comfort end of life cares  Acute hypoxemic respiratory failure  LLL PNA  COPD exacerbation  New congestive heart failure, demand ischemia  ROBINSON resolved  AAA  Bladder mass    History of Present Illness   Bong Noguera is a 82 year old male with PMH COPD, suspected bladder cancer, BPH who was admitted on 9/15/2017 with acute hypoxic respiratory failure and severe sepsis. FTH elevated troponins and possible acute systolic CHF exacerbation. Following nonimprovement on initial treatment, goals of care discussion was conducted, and patient elected to be transitioned to comfort cares. Will discharge to SNF as a hosp[facility] patient[facility].    Hospital Course   Bong Noguera was admitted on 9/15/2017.  The following problems were addressed during his hospitalization:    Goals of care: During this admission patient has refused PEG placement, aggressive measures, and has indicated that he prefers comfort cares measures. He has refused PT evaluation and has not been fully evaluated for TCU. Has failed swallow study with significant aspiration risk. Patient has acknowledged his aspiration risk and has decided to continue eating. Nephew is POA and has been helping patient with this transition process. Palliative consulted, now signed off.     Acute hypoxemic respiratory failure  LLL PNA  COPD exacerbation  On admission noted hypotension, elevated lactate, elevated procalcitonin with large LLL infiltrate, and increased O2 reqs on HF. Noted WBC increase on 9/22 to 24.3k. Treated with a total 10 day course starting with Ceft/Azithro -> Zosyn, ending 9/24/17. Given IV steroids for possible COPD component. On 9/25 patient appears symptomatically improved however no further evaluations given comfort cares approach. Sputum cx  with Candida, likely contaminant, Bcx NGTD.   - Continue Fluticasone and Spiriva     New congestive heart failure, demand ischemia: Elevated troponins in the setting of sepsis. TTE showed EF 30-35% and inferoapical akinesis which is new. Cardiology consulted and have recommended medication management.    ROBINSON, resolved.    AAA 7cm: Further treatment deferred    Bladder mass: Dx in 2016 but reportedly unable to make appt for TURBT. Hematuria was reported at home. Further evaluation stopped due to comfort cares.  - Cont Tamsulosin    Medications recommendations for hospice:  Morphine sulfate 20mg/ml give 10mg or 0.5ml SL every 2 hrs as needed for pain/dyspnea  Lorazepam 0.25mg PO/SL every 4 hours PRN anxiety/restlessness  Haloperidol 0.5mg PO/SL every 6 hours PRN agitation/nausea  Atropine 1% 2 drops PO/SL every 2 hours PRN terminal congestion  Bisacodyl supp 10mg AR daily PRN constipation  Acetaminophen supp 650mg AR every 4 hours PRN fever/mild pain  Senna 8.6mg 1 tab po 2x day as needed for constipation  Thank you.Donna Bishop RN, BSN   Hospice Admission nurse  [phone]    Active Problems:    Pneumonia      David Olivarez MD    Significant Results and Procedures   Echo 9/17    Pending Results   none  Unresulted Labs Ordered in the Past 30 Days of this Admission     No orders found from 7/17/2017 to 9/16/2017.          Code Status   DNR / DNI       Primary Care Physician   Chaparro Carroll    Physical Exam   Temp: 98.7  F (37.1  C) Temp src: Oral BP: 103/62   Heart Rate: 91 Resp: 16 SpO2: 92 % O2 Device: Nasal cannula Oxygen Delivery: 3 LPM  Vitals:    09/22/17 0658 09/23/17 0536   Weight: 65 kg (143 lb 4.8 oz) 65.5 kg (144 lb 6.4 oz)     Vital Signs with Ranges  Temp:  [98.7  F (37.1  C)] 98.7  F (37.1  C)  Heart Rate:  [91] 91  Resp:  [16-18] 16  BP: (103)/(62) 103/62  SpO2:  [92 %-94 %] 92 %  I/O last 3 completed shifts:  In: 20 [P.O.:20]  Out: 1125 [Urine:1125]    Constitutional: Male in NAD, no  respiratory distress, cachectic  Eyes: PERRL, nonicteric, normal ocular movements  HEENT: Normocephalic, atraumatic, oral mucosa moist  Neurologic: A&Ox3  Psychiatric: Appropriate affect and mood    Discharge Disposition   Discharged to long-term care facility  Condition at discharge: Stable    Consultations This Hospital Stay   UROLOGY IP CONSULT  PHYSICAL THERAPY ADULT IP CONSULT  OCCUPATIONAL THERAPY ADULT IP CONSULT  NUTRITION SERVICES ADULT IP CONSULT  SPEECH LANGUAGE PATH ADULT IP CONSULT  CARDIOLOGY IP CONSULT  VASCULAR SURGERY IP CONSULT  [place] VASCULAR MEDICINE IP CONSULT  WOUND OSTOMY CONTINENCE NURSE  IP CONSULT  NUTRITION SERVICES ADULT IP CONSULT  PALLIATIVE CARE ADULT IP CONSULT  SOCIAL WORK IP CONSULT  OCCUPATIONAL THERAPY ADULT IP CONSULT  PSYCHOLOGY ADULT IP CONSULT  PSYCHIATRY IP CONSULT    Time Spent on this Encounter   I, David Olivarez, personally saw the patient today and spent less than or equal to 30 minutes discharging this patient.    Discharge Orders     Follow Up   Dr. Trujillo's surgery scheduler, Randee, has scheduled your TURBT bladder cancer procedure on 10/3/17 at 10:30am. Please allow her 3-4 business days to call you at home with pre-operative instructions. If you have not heard from her after a week, you can call Randee at [phone] to inquire about when it will be scheduled.     [facility]  [address] [place] [address], [address]  [place]. [address]     General info for SNF   Length of Stay Estimate: Long Term Care  Condition at Discharge: Terminal  Level of care:skilled   Rehabilitation Potential: Poor  Admission H&P remains valid and up-to-date: Yes  Recent Chemotherapy: N/A  Use Nursing Home Standing Orders: N/A     Mantoux instructions   Give two-step Mantoux (PPD) Per Facility Policy Yes     Reason for your hospital stay   You were hospitalized for a pneumonia. You will discharged to a facility on hospice.     Activity - Up with nursing assistance     Oxygen  - Nasal cannula   1 Lpm by nasal cannula to keep O2 sats 92% or greater.     Advance Diet as Tolerated   Follow this diet upon discharge: Orders Placed This Encounter     Room Service     Combination Diet Regular Diet Adult       Discharge Medications   Current Discharge Medication List      START taking these medications    Details   melatonin 3 MG tablet Take 1 tablet (3 mg) by mouth nightly as needed    Associated Diagnoses: Pneumonia due to infectious organism, unspecified laterality, unspecified part of lung; Acute respiratory failure with hypoxia (H)      morphine sulfate HIGH CONCENTRATE (ROXANOL *CONCENTRATED*) 100 MG/5ML (HIGH CONC) solution Place 0.5 mLs (10 mg) under the tongue every 2 hours as needed for moderate to severe pain or other (or dyspnea)  Refills: 0    Associated Diagnoses: Pneumonia due to infectious organism, unspecified laterality, unspecified part of lung      mirtazapine (REMERON) 7.5 MG TABS tablet Take 1 tablet (7.5 mg) by mouth At Bedtime  Qty: 60 tablet    Associated Diagnoses: Insomnia, unspecified type      nicotine polacrilex (NICORETTE) 2 MG gum Place 1 each (2 mg) inside cheek every hour as needed for smoking cessation  Qty: 30 tablet    Associated Diagnoses: Chronic obstructive pulmonary disease, unspecified COPD type (H)      LORazepam (ATIVAN) 0.5 MG tablet Take 0.5 tablets (0.25 mg) by mouth every 4 hours as needed for anxiety  Qty: 60 tablet    Associated Diagnoses: End of life care      haloperidol (HALDOL) 0.5 MG tablet Take 1 tablet (0.5 mg) by mouth every 6 hours as needed for agitation    Associated Diagnoses: End of life care      bisacodyl (DULCOLAX) 10 MG Suppository Place 1 suppository (10 mg) rectally daily as needed for constipation  Qty: 25 suppository, Refills: 1    Associated Diagnoses: End of life care      senna (SENOKOT) 8.6 MG tablet Take 1 tablet by mouth daily  Qty: 120 tablet    Associated Diagnoses: End of life care      acetaminophen (TYLENOL) 650 MG  Suppository Place 1 suppository (650 mg) rectally every 4 hours as needed for fever  Qty: 60 suppository    Associated Diagnoses: End of life care      atropine 1 % SOLN Place 2 drops under the tongue every 2 hours as needed for secretions    Associated Diagnoses: End of life care         CONTINUE these medications which have CHANGED    Details   oxymetazoline (AFRIN) 0.05 % spray Spray 2 sprays into both nostrils 2 times daily as needed for congestion    Associated Diagnoses: Pneumonia due to infectious organism, unspecified laterality, unspecified part of lung         CONTINUE these medications which have NOT CHANGED    Details   tamsulosin (FLOMAX) 0.4 MG capsule Take 0.4 mg by mouth once a week      fluticasone (FLONASE) 50 MCG/ACT spray Spray 1 spray into both nostrils daily as needed for rhinitis       Pseudoephedrine HCl (SUDAFED PO) Take by mouth every morning Dose unknown      PULMICORT FLEXHALER 180 MCG/ACT inhaler TAKE 2 PUFF BY MOUTH TWICE DAILY  Qty: 3 Inhaler, Refills: 2    Associated Diagnoses: COPD (chronic obstructive pulmonary disease) (H)      SPIRIVA HANDIHALER 18 MCG inhalation capsule INHALE 1 CAPSULE BY MOUTH INTO THE LUNGS DAILY  Qty: 90 capsule, Refills: 2    Associated Diagnoses: COPD (chronic obstructive pulmonary disease) (H)      Nasal Dilators (BREATHE RIGHT ADVANCED) STRP daily    Associated Diagnoses: Need for pneumococcal vaccination           Allergies   Allergies   Allergen Reactions     Ciprofloxacin      Clindamycin Hcl      Hydrocodone      Was no help in the past     Tylenol With Codeine [Acetaminophen-Codeine]      He things he passed out with this     Data   Most Recent 3 CBC's:  Recent Labs   Lab Test  09/22/17   0717  09/21/17   0510  09/20/17   0845   WBC  24.3*  17.0*  24.7*   HGB  14.2  14.7  13.6   MCV  97  97  97   PLT  405  315  290      Most Recent 3 BMP's:  Recent Labs   Lab Test  09/22/17   0717  09/21/17   0510  09/20/17   1650  09/19/17   0310   09/17/17   0340    NA  141  140   --    --    --   139   POTASSIUM  3.8  3.9  3.2*   --    < >  3.4   CHLORIDE  100  101   --    --    --   107   CO2  34*  33*   --    --    --   24   BUN  52*  38*   --    --    --   50*   CR  0.76  0.72   --   0.66   --   0.94   ANIONGAP  7  6   --    --    --   8   LETHA  8.0*  7.8*   --    --    --   8.2*   GLC  144*  148*   --    --    --   146*    < > = values in this interval not displayed.     Most Recent 2 LFT's:  Recent Labs   Lab Test  09/21/17   0510  09/15/17   2149   AST  35  20   ALT  29  15   ALKPHOS  126  93   BILITOTAL  0.4  0.8     Most Recent INR's and Anticoagulation Dosing History:  Anticoagulation Dose History     There is no flowsheet data to display.        Most Recent 3 Troponin's:  Recent Labs   Lab Test  09/17/17   1214  09/17/17   0617  09/17/17   0340   TROPI  0.567*  0.972*  0.470*     Most Recent Cholesterol Panel:  Recent Labs   Lab Test  09/18/17   0845   CHOL  58   LDL  26   HDL  13*   TRIG  97     Most Recent 6 Bacteria Isolates From Any Culture (See EPIC Reports for Culture Details):  Recent Labs   Lab Test  09/20/17   1230  09/20/17   1220  09/16/17   1850  09/16/17   0408  09/15/17   2252  09/15/17   2242   CULT  No growth  No growth  Light growth  Candida albicans / dubliniensis  Candida albicans and Candida dubliniensis are not routinely speciated  Susceptibility testing not routinely done  *  >10 Squamous epithelial cells/low power field indicates oral contamination. Please   recollect.  *  Canceled, Test credited  Notification of test cancellation was given to  Victorina Owens RN [id number], @2341 9/16/17..    No growth  No growth     Most Recent TSH, T4 and A1c Labs:  Recent Labs   Lab Test  05/30/17   1620  02/09/16   1215   T4  1.33   --    A1C   --   5.8*

## 2017-09-29 NOTE — PROGRESS NOTES
Patient is on 3L bubbler with SpO2 in the low 90's. BS diminished and coarse at times. All nebs were given as ordered.  Will cont to follow.  9/29/2017  Amy Jara RRT

## 2017-09-29 NOTE — PLAN OF CARE
"Problem: Goal Outcome Summary  Goal: Goal Outcome Summary  Outcome: No Change  Comfort cares. A&O. Frustrated at times with cares. Morphine given x1 for pain in \"lungs\". Robitussin given x1. Produces large amounts of tan sputum. 2L of oxygen for comfort. Takes on and off. Refuses repositioning at times; turned when pt allows. Dressing changed to sacrum. Voids in urinal but occasionally misses. Fair appetite. Bedrest this shift. Plan for d/c with hospice, possibly this afternoon. SW awaiting bed confirmation. Hospice meds being filled here. Continue to monitor.       "

## 2017-09-29 NOTE — PLAN OF CARE
Problem: Patient Care Overview  Goal: Plan of Care/Patient Progress Review  Outcome: No Change   A&Ox4. C/o chest pain, prn morphine given x1, effective. VS & assessments deferred, pt is on comfort cares. Turn/repo and oral cares Q2h. Frequent, productive cough w/yellow sputum, prn robitussin given 1 with some relief. C/o nasal congestion, prn ocean spray x2, sudafed and scheduled afrin x1 given with some relief. Voiding in urinal. Dressing on coccyx and mid-back CDI.

## 2017-09-29 NOTE — PROGRESS NOTES
JEFF  I: JEFF called and left messages with [facility]. JEFF called [facility] and was updated that family would need to have a check for $7500 upon admission and then would be reimbursed for the days that were not used if [facility]. JEFF will update family. [facility] will call SW back to confirm a bed is available.    P: SW will continue to follow and assist as needed.    ADDENDUM  I: JEFF spoke with nephew and he is ok with the fee required upon admission. Brooks stated to take the first bed that accepts and has a bed available. Brooks will be at hospital around 1500 to sign consent with  hospice. Hospice RN is putting in med reccs. Meds will need to be filled at hospital (no ranges and bubble packed). JEFF will update nephew once bed is confirmed.     ADDENDUM  I: JEFF was updated that [facility] can accept patient today. JEFF will arrange stretcher for patient due to patient requiring O2 and being unable to regulate by self as well as being on bedrest and d/cing on hospice. JEFF arranged stretcher for 1830. JEFF will fax orders/PAS when complete.     PAS-RR    D: Per DHS regulation, JEFF completed and submitted PAS-RR to [place] [employer] Direct Connect via the Senior LinkAge Line.  PAS-RR confirmation # is : [id number]      I: JEFF spoke with nephew and they are aware a PAS-RR has been submitted.  JEFF reviewed with nephew that they may be contacted for a follow up appointment within 10 days of hospital discharge if their SNF stay is < 30 days.  Contact information for [facility] LinkAge Line was also provided.    A: nephew verbalized understanding.    P: Further questions may be directed to [facility] LinkAge Line at #[phone], option #4 for PAS-RR staff.      JEFF confirmed d/c plan with nephew. JEFF also called [facility] contact [facility] to ensure they follow up with patient after he d/c;s from hospital. [facility] provided [facility] with [facility] of [facility]/c facility, admissions number, and nephews information. JEFF will also give  [facility] information. Orders were sent via DOD.     Shonda Zurita, MONICAW   *[phone]

## 2017-09-29 NOTE — PROGRESS NOTES
[facility]: In anticipation for hospice discharge to a skilled nursing facility with hospice cares please consider ordering the following hospice comfort meds at discharge:  Morphine sulfate 20mg/ml give 10mg or 0.5ml SL every 2 hrs as needed for pain/dyspnea  Lorazepam 0.25mg PO/SL every 4 hours PRN anxiety/restlessness  Haloperidol 0.5mg PO/SL every 6 hours PRN agitation/nausea  Atropine 1% 2 drops PO/SL every 2 hours PRN terminal congestion  Bisacodyl supp 10mg AL daily PRN constipation  Acetaminophen supp 650mg AL every 4 hours PRN fever/mild pain  Senna 8.6mg 1 tab po 2x day as needed for constipation  Thank you.Donna Bishop RN, BSN   Hospice Admission nurse  [phone]

## 2017-09-29 NOTE — DISCHARGE INSTRUCTIONS
[facility] [phone], Fax: [phone]    [address] [address]  [phone]

## 2017-09-29 NOTE — PLAN OF CARE
Problem: Goal Outcome Summary  Goal: Goal Outcome Summary  Outcome: No Change  A&Ox4. VS & assessments deferred, pt is on comfort cares. Turn/repo and oral cares Q2h. Frequent, productive cough w/yellow sputum.oiding in urinal. Dressing on coccyx CDI and mid-back changed. Will cont to monitor.

## 2017-09-30 NOTE — DISCHARGE SUMMARY
Patient discharged at 7:35 PM to [facility].IV was discontinued. Pain at time of discharge was 0/10. Belongings returned to patient.  Discharge instructions and medications reviewed with patient.  Patient verbalized understanding and all questions were answered.  Prescriptions given to patient.  At time of discharge, patient condition was stable and left the unit on stretcher escorted by transport.

## 2017-10-05 NOTE — PROGRESS NOTES
[place] [facility]  PRIMARY CARE PROVIDER AND CLINIC:  Chaparro Carroll [address] / [facility] [address]  Chief Complaint   Patient presents with     Hospital F/U       HPI:    Bong Noguera is a 82 year old  (1935),admitted to the [facility] from [facility].  Hospital stay 9/15/17 through 9/29/17.  Admitted to this facility for  medical management and nursing care.      Hospital Course Per [facility] Discharge Summary 9/29/17:    Bong Noguera is a 82 year old male with PMH COPD, suspected bladder cancer, BPH who was admitted on 9/15/2017 with acute hypoxic respiratory failure and severe sepsis. FTH elevated troponins and possible acute systolic CHF exacerbation. Following nonimprovement on initial treatment, goals of care discussion was conducted, and patient elected to be transitioned to comfort cares. [facility] SN[facility] as [facility].        H[facility] Course     Bong Noguera was admitted on 9/15/2017.  The following problems were addressed during his hospitalization:     Goals of care: During this admission patient has refused PEG placement, aggressive measures, and has indicated that he prefers comfort cares measures. He has refused PT evaluation and has not been fully evaluated for TCU. Has failed swallow study with significant aspiration risk. Patient has acknowledged his aspiration risk and has decided to continue eating. Nephew is POA and has been helping patient with this transition process. Palliative consulted, now signed off.      Acute hypoxemic respiratory failure  LLL PNA  COPD exacerbation  On admission noted hypotension, elevated lactate, elevated procalcitonin with large LLL infiltrate, and increased O2 reqs on HF. Noted WBC increase on 9/22 to 24.3k. Treated with a total 10 day course starting with Ceft/Azithro -> Zosyn, ending 9/24/17. Given IV steroids for possible COPD component. On 9/25  patient appears symptomatically improved however no further evaluations given comfort cares approach. Sputum cx with Candida, likely contaminant, Bcx NGTD.   - Continue Fluticasone and Spiriva      New congestive heart failure, demand ischemia: Elevated troponins in the setting of sepsis. TTE showed EF 30-35% and inferoapical akinesis which is new. Cardiology consulted and have recommended medication management.     ROBINSON, resolved.     AAA 7cm: Further treatment deferred     Bladder mass: Dx in 2016 but reportedly unable to make appt for TURBT. Hematuria was reported at home. Further evaluation stopped due to comfort cares.  - Cont Tamsulosin         HPI information obtained from: facility chart records, facility staff, patient report and [facility] chart review.      Current issues are:      Pneumonia due to infectious organism, unspecified laterality, unspecified part of lung  Chronic obstructive pulmonary disease, unspecified COPD type (H)  Copious amounts of secretions, patient has difficulty coughing these up. He has used oral suctioning with good success. Currently on spiriva and pulmicort inhalers. He is predominantly on bedrest, has difficulty taking deep breaths. No fever currently. On 4 liters oxygen.    Heart failure, unspecified heart failure chronicity, unspecified heart failure type (H)  Demand ischemia (H)  Denies chest pain. No edema.    Abdominal aortic aneurysm (H)  See above    Bladder mass  See above. 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  (1/30/2015).  PAST SURGICAL HISTORY:  has no past surgical history on file.  FAMILY HISTORY: family history is not on file.  SOCIAL HISTORY:  reports that he has been smoking Cigars.  He has been smoking about 0.10 packs per day. He has never used smokeless tobacco. He reports that he drinks about 0.5 oz of alcohol per week  He reports that he does not use illicit drugs.    Post Discharge Medication Reconciliation Status: discharge medications reconciled and changed, per note/orders (see AVS).  Current Outpatient Prescriptions   Medication Sig Dispense Refill     sodium chloride (OCEAN) 0.65 % nasal spray Spray 1 spray into both nostrils 2 times daily       Nutritional Supplements (ENSURE) LIQD Take by mouth 2 times daily Give 120mL       MELATONIN PO Take 5 mg by mouth At Bedtime       guaiFENesin (MUCINEX) 600 MG 12 hr tablet Take 600 mg by mouth 2 times daily       morphine sulfate HIGH CONCENTRATE (ROXANOL *CONCENTRATED*) 100 MG/5ML (HIGH CONC) solution Place 0.5 mLs (10 mg) under the tongue every 2 hours as needed for moderate to severe pain or other (or dyspnea)  0     mirtazapine (REMERON) 7.5 MG TABS tablet Take 1 tablet (7.5 mg) by mouth At Bedtime 60 tablet      oxymetazoline (AFRIN) 0.05 % spray Spray 2 sprays into both nostrils 2 times daily as needed for congestion       nicotine polacrilex (NICORETTE) 2 MG gum Place 1 each (2 mg) inside cheek every hour as needed for smoking cessation 30 tablet      LORazepam (ATIVAN) 0.5 MG tablet Take 0.5 tablets (0.25 mg) by mouth every 4 hours as needed for anxiety 60 tablet      haloperidol (HALDOL) 0.5 MG tablet Take 1 tablet (0.5 mg) by mouth every 6 hours as needed for agitation       bisacodyl (DULCOLAX) 10 MG Suppository Place 1 suppository (10 mg) rectally daily as needed for constipation 25 suppository 1     senna (SENOKOT) 8.6 MG tablet Take 1 tablet by mouth daily 120 tablet      acetaminophen (TYLENOL) 650 MG Suppository Place 1 suppository (650 mg)  rectally every 4 hours as needed for fever 60 suppository      atropine 1 % SOLN Place 2 drops under the tongue every 2 hours as needed for secretions       tamsulosin (FLOMAX) 0.4 MG capsule Take 0.4 mg by mouth once a week       fluticasone (FLONASE) 50 MCG/ACT spray Spray 1 spray into both nostrils daily as needed for rhinitis        PULMICORT FLEXHALER 180 MCG/ACT inhaler TAKE 2 PUFF BY MOUTH TWICE DAILY 3 Inhaler 2     SPIRIVA HANDIHALER 18 MCG inhalation capsule INHALE 1 CAPSULE BY MOUTH INTO THE LUNGS DAILY 90 capsule 2     Nasal Dilators (BREATHE RIGHT ADVANCED) STRP daily         ROS:  10 point ROS of systems including Constitutional, Eyes, Respiratory, Cardiovascular, Gastroenterology, Genitourinary, Integumentary, Muscularskeletal, Psychiatric were all negative except for pertinent positives noted in my HPI.    Exam:  /77  Pulse 98  Temp 98.8  F (37.1  C)  Resp 16  Wt 144 lb 8 oz (65.5 kg)  SpO2 97%  BMI 17.14 kg/m2  GENERAL APPEARANCE:  Alert, oriented, thin  ENT:  oral mucous membranes moist, poor dentition  EYES:  EOM, conjunctivae, lids, pupils and irises normal  NECK:  No adenopathy,masses or thyromegaly  RESP:  no respiratory distress, crackles throughout, cough loose  CV:  Palpation and auscultation of heart done , regular rate and rhythm, no murmur, rub, or gallop  ABDOMEN:  normal bowel sounds, soft, nontender, no hepatosplenomegaly or other masses  PSYCH:  oriented X 3, normal insight, judgement and memory, affect abnormal flat    Lab/Diagnostic data:      CBC RESULTS:   Recent Labs   Lab Test  09/22/17 0717 09/21/17   0510   WBC  24.3*  17.0*   RBC  4.41  4.60   HGB  14.2  14.7   HCT  42.8  44.7   MCV  97  97   MCH  32.2  32.0   MCHC  33.2  32.9   RDW  13.7  13.9   PLT  405  315       Last Basic Metabolic Panel:  Recent Labs   Lab Test  09/22/17 0717 09/21/17   0510   NA  141  140   POTASSIUM  3.8  3.9   CHLORIDE  100  101   LETHA  8.0*  7.8*   CO2  34*  33*   BUN  52*  38*   CR   0.76  0.72   GLC  144*  148*         ASSESSMENT/PLAN:  (J18.9) Pneumonia due to infectious organism, unspecified laterality, unspecified part of lung  (primary encounter diagnosis)  (J44.9) Chronic obstructive pulmonary disease, unspecified COPD type (H)  Comment: No acute distress. Goal is comfort care. Oral suction is appropriate due to excessive secretions and inability to cough effectively. Secretions are too excessive to treat with atropine. Inhaled medications will be difficult for him to manage, inspiration too shallow, will change to nebs.  Plan: Oral suction at bedside. D/C spiriva and pulmicort. Duoneb TID and q4h prn. Cont Morphine prn. Monitor respiratory status, symptom control only.    (I50.9) Heart failure, unspecified heart failure chronicity, unspecified heart failure type (H)  (I24.8) Demand ischemia (H)  Comment: No edema. Difficult to assess for pulmonary edema due to COPD. No chest pain.  Plan: Continue current POC with no changes at this time and adjustments as needed.    (I71.4) Abdominal aortic aneurysm (H)  Comment: No follow up needed due to comfort care    (N32.89) Bladder mass  Comment: No symptoms at this point. At risk for hematuria, urinary retention  Plan: Monitor for s/sx urinary retention    (R19.5) Loose stools  Comment: Likely due to senna. He has used morphine only once so far, if his use increases, then he may need laxatives  Plan: Change senna to prn. Monitor bowel function    (Z51.5) Hospice care patient  Comment: End stage COPD with CHF, likely bladder cancer  Plan: Comfort care        Orders:  Change senna to 1 tab BID prn  D/C spiriva and pulmicort  Duoneb TID and q4h prn          Electronically signed by:  ROMANA Solomon [facility]  Pager: [phone]

## 2017-10-09 PROBLEM — Z51.5 HOSPICE CARE PATIENT: Status: ACTIVE | Noted: 2017-01-01

## 2017-10-09 PROBLEM — I24.89 DEMAND ISCHEMIA (H): Status: ACTIVE | Noted: 2017-01-01

## 2017-10-09 PROBLEM — N32.89 BLADDER MASS: Status: ACTIVE | Noted: 2017-01-01

## 2017-10-09 PROBLEM — I71.40 ABDOMINAL AORTIC ANEURYSM (AAA) WITHOUT RUPTURE (H): Status: ACTIVE | Noted: 2017-01-01

## 2017-10-09 PROBLEM — R19.5 LOOSE STOOLS: Status: ACTIVE | Noted: 2017-01-01

## 2017-10-09 NOTE — PROGRESS NOTES
[place] [facility]    Chief Complaint   Patient presents with     RECHECK       HPI:    Bong Noguera is a 82 year old  (1935), who is being seen today for an episodic care visit at [facility]. Hospital stay was at [facility] from 9/15/17 through 9/29/17. PMH COPD, suspected bladder cancer, BPH who was admitted on 9/15/2017 with acute hypoxic respiratory failure and severe sepsis. He was not improving with treatment and after goals of care discussion, he opted for comfort care. Admitted to this facility for hospice.    HPI information obtained from: facility chart records, facility staff and patient report.    Today's concern is:  Pneumonia due to infectious organism, unspecified laterality, unspecified part of lung  Chronic obstructive pulmonary disease, unspecified COPD type (H)  Patient using oral suctioning for copious secretions with good success. Inhaled medications were changed to nebulizers 10/5/17 for ease of administration. He says this is going well. No fever currently. On 4 liters oxygen.    Heart failure, unspecified heart failure chronicity, unspecified heart failure type (H)  Demand ischemia (H)  Denies chest pain. No edema.    Loose stools  Order written to change Senna to prn due to patient c/o loose stools. Due to transcription error, his dose was actually increased and he has been getting it BID. He does not think he is having diarrhea. Nursing says he has not had a BM today, but she is not sure about the last few days.    Hospice care patient  On [place] [facility]        ALLERGIES: Ciprofloxacin; Clindamycin hcl; Hydrocodone; and Tylenol with codeine [acetaminophen-codeine]  Past Medical, Surgical, Family and Social History reviewed and updated in EP[facility].    Current Outpatient Prescriptions   Medication Sig Dispense Refill     sennosides (SENOKOT) 8.6 MG tablet Take 1 tablet by mouth 2 times daily       sodium chloride (OCEAN) 0.65 % nasal spray Spray 1 spray  into both nostrils 2 times daily       Nutritional Supplements (ENSURE) LIQD Take by mouth 2 times daily Give 120mL       MELATONIN PO Take 5 mg by mouth At Bedtime       guaiFENesin (MUCINEX) 600 MG 12 hr tablet Take 600 mg by mouth 2 times daily       ipratropium - albuterol 0.5 mg/2.5 mg/3 mL (DUONEB) 0.5-2.5 (3) MG/3ML neb solution TID and q4h prn 30 vial 1     morphine sulfate HIGH CONCENTRATE (ROXANOL *CONCENTRATED*) 100 MG/5ML (HIGH CONC) solution Place 0.5 mLs (10 mg) under the tongue every 2 hours as needed for moderate to severe pain or other (or dyspnea)  0     mirtazapine (REMERON) 7.5 MG TABS tablet Take 1 tablet (7.5 mg) by mouth At Bedtime 60 tablet      oxymetazoline (AFRIN) 0.05 % spray Spray 2 sprays into both nostrils 2 times daily as needed for congestion       nicotine polacrilex (NICORETTE) 2 MG gum Place 1 each (2 mg) inside cheek every hour as needed for smoking cessation 30 tablet      LORazepam (ATIVAN) 0.5 MG tablet Take 0.5 tablets (0.25 mg) by mouth every 4 hours as needed for anxiety 60 tablet      haloperidol (HALDOL) 0.5 MG tablet Take 1 tablet (0.5 mg) by mouth every 6 hours as needed for agitation       bisacodyl (DULCOLAX) 10 MG Suppository Place 1 suppository (10 mg) rectally daily as needed for constipation 25 suppository 1     acetaminophen (TYLENOL) 650 MG Suppository Place 1 suppository (650 mg) rectally every 4 hours as needed for fever 60 suppository      atropine 1 % SOLN Place 2 drops under the tongue every 2 hours as needed for secretions       tamsulosin (FLOMAX) 0.4 MG capsule Take 0.4 mg by mouth once a week       fluticasone (FLONASE) 50 MCG/ACT spray Spray 1 spray into both nostrils daily as needed for rhinitis        Nasal Dilators (BREATHE RIGHT ADVANCED) STRP daily       Medications reviewed:  Medications reconciled to facility chart and changes were made to reflect current medications as identified as above med list.     REVIEW OF SYSTEMS:  Reliability  questionable secondary to cognitive impairment. Denies chest pain, fevers, chills, headache, nausea, vomiting, dysuria or bowel abnormalities.    Physical Exam:  BP 93/63  Pulse 101  Temp 98.8  F (37.1  C)  Resp 16  Wt 144 lb 8 oz (65.5 kg)  SpO2 95%  BMI 17.14 kg/m2   GENERAL APPEARANCE:  Alert, oriented, thin  ENT:  oral mucous membranes moist, poor dentition  EYES:  EOM, conjunctivae, lids, pupils and irises normal  NECK:  No adenopathy,masses or thyromegaly  RESP:  no respiratory distress, crackles throughout, cough loose  CV:  Palpation and auscultation of heart done , regular rate and rhythm, no murmur, rub, or gallop  ABDOMEN:  normal bowel sounds, soft, nontender, no hepatosplenomegaly or other masses  PSYCH:  oriented X 3, normal insight, judgement and memory, affect abnormal flat      Recent Labs:      CBC RESULTS:   Recent Labs   Lab Test  09/22/17   0717  09/21/17   0510   WBC  24.3*  17.0*   RBC  4.41  4.60   HGB  14.2  14.7   HCT  42.8  44.7   MCV  97  97   MCH  32.2  32.0   MCHC  33.2  32.9   RDW  13.7  13.9   PLT  405  315       Last Basic Metabolic Panel:  Recent Labs   Lab Test  09/22/17   0717  09/21/17   0510   NA  141  140   POTASSIUM  3.8  3.9   CHLORIDE  100  101   LETHA  8.0*  7.8*   CO2  34*  33*   BUN  52*  38*   CR  0.76  0.72   GLC  144*  148*       Liver Function Studies -   Recent Labs   Lab Test  09/21/17   0510  09/17/17   0340  09/15/17   2149   PROTTOTAL  5.4*   --   6.2*   ALBUMIN  1.4*  1.6*  2.0*   BILITOTAL  0.4   --   0.8   ALKPHOS  126   --   93   AST  35   --   20   ALT  29   --   15         Lab Results   Component Value Date    A1C 5.8 02/09/2016         Assessment/Plan:  (J18.9) Pneumonia due to infectious organism, unspecified laterality, unspecified part of lung  (primary encounter diagnosis)  (J44.9) Chronic obstructive pulmonary disease, unspecified COPD type (H)  Comment: No acute distress. Goal is comfort care.   Plan: Continue current POC with no changes at this  time and adjustments as needed. Monitor respiratory status, symptom control only.    (I50.9) Heart failure, unspecified heart failure chronicity, unspecified heart failure type (H)  (I24.8) Demand ischemia (H)  Comment: No edema. Difficult to assess for pulmonary edema due to COPD. No chest pain.  Plan: Continue current POC with no changes at this time and adjustments as needed.    (R19.5) Loose stools  Comment: Does not appear to be an issue at this time, even with increased laxatives  Plan: Cont senna. Monitor bowel function. Adjust medication as clinically indicated.    (Z51.5) Hospice care patient  Comment: End stage COPD with CHF, likely bladder cancer  Plan: Comfort care        Electronically signed by  ROMANA Solomon [facility]  Pager: [phone]

## 2017-10-12 NOTE — PROGRESS NOTES
[place] [facility]    Chief Complaint   Patient presents with     RECHECK       HPI:    Bong Noguera is a 82 year old  (1935), who is being seen today for an episodic care visit at [facility].  Hospital stay was at [facility] from 9/15/17 through 9/29/17. PMH COPD, suspected bladder cancer, BPH who was admitted on 9/15/2017 with acute hypoxic respiratory failure and severe sepsis. He was not improving with treatment and after goals of care discussion, he opted for comfort care. Admitted to this facility for hospice.    HPI information obtained from: facility chart records, facility staff, patient report and [facility] chart review.    Today's concern is:  Chronic obstructive pulmonary disease, unspecified COPD type (H)  Currently on 4L oxygen with saturations >92%. Inhaled medications changed to Nebulizer treatments 10/5/17, patient says these are helping with his breathing. Oral suctioning per self for reported thick, yellow secretions with relief.     Loose stools  Currently on senna BID, staff reports 1-2 stools per day, formed to loose    Hospice care patient  On [place] [facility].  Comfort care measures in place.     Bladder mass  Suspected bladder cancer. No treatment at this time. No current difficulty with urination, no hematuria.        ALLERGIES: Ciprofloxacin; Clindamycin hcl; Hydrocodone; and Tylenol with codeine [acetaminophen-codeine]  Past Medical, Surgical, Family and Social History reviewed and updated in [facility].    Current Outpatient Prescriptions   Medication Sig Dispense Refill     sennosides (SENOKOT) 8.6 MG tablet Take 1 tablet by mouth 2 times daily       Nutritional Supplements (ENSURE) LIQD Take by mouth 2 times daily Give 120mL       MELATONIN PO Take 5 mg by mouth At Bedtime       ipratropium - albuterol 0.5 mg/2.5 mg/3 mL (DUONEB) 0.5-2.5 (3) MG/3ML neb solution TID and q4h prn 30 vial 1     morphine sulfate HIGH CONCENTRATE (ROXANOL  *CONCENTRATED*) 100 MG/5ML (HIGH CONC) solution Place 0.5 mLs (10 mg) under the tongue every 2 hours as needed for moderate to severe pain or other (or dyspnea)  0     mirtazapine (REMERON) 7.5 MG TABS tablet Take 1 tablet (7.5 mg) by mouth At Bedtime 60 tablet      oxymetazoline (AFRIN) 0.05 % spray Spray 2 sprays into both nostrils 2 times daily as needed for congestion       nicotine polacrilex (NICORETTE) 2 MG gum Place 1 each (2 mg) inside cheek every hour as needed for smoking cessation 30 tablet      LORazepam (ATIVAN) 0.5 MG tablet Take 0.5 tablets (0.25 mg) by mouth every 4 hours as needed for anxiety 60 tablet      haloperidol (HALDOL) 0.5 MG tablet Take 1 tablet (0.5 mg) by mouth every 6 hours as needed for agitation       bisacodyl (DULCOLAX) 10 MG Suppository Place 1 suppository (10 mg) rectally daily as needed for constipation 25 suppository 1     acetaminophen (TYLENOL) 650 MG Suppository Place 1 suppository (650 mg) rectally every 4 hours as needed for fever 60 suppository      atropine 1 % SOLN Place 2 drops under the tongue every 2 hours as needed for secretions       tamsulosin (FLOMAX) 0.4 MG capsule Take 0.4 mg by mouth once a week       fluticasone (FLONASE) 50 MCG/ACT spray Spray 1 spray into both nostrils daily as needed for rhinitis        Nasal Dilators (BREATHE RIGHT ADVANCED) STRP daily       Medications reviewed:  Medications reconciled to facility chart and changes were made to reflect current medications as identified as above med list.         REVIEW OF SYSTEMS:  4 point ROS including Respiratory, CV, GI and , other than that noted in the HPI,  is negative    Physical Exam:  /73  Pulse 105  Temp 99  F (37.2  C)  Resp 20  Wt 144 lb 8 oz (65.5 kg)  SpO2 93%  BMI 17.14 kg/m2  GENERAL APPEARANCE:  in no distress, uncooperative, minimal response to questions and declined full ROS and assessment  RESP:  no respiratory distress, productive cough with thick yellow  sputum  ABDOMEN:  normal bowel sounds, soft, nontender, no hepatosplenomegaly or other masses  PSYCH:  oriented to place.      Assessment/Plan:  (J44.9) Chronic obstructive pulmonary disease, unspecified COPD type (H)  (primary encounter diagnosis)  Comment: No acute distress.   Plan: Continue with comfort care measures, no changes at this time. Continue to monitor symptom control and respiratory status.     (R19.5) Loose stools  Comment: Having more BMs than necessary, will decrease senna  Plan: Change scheduled Senna to daily. Continue to monitor bowel function. Adjust medication as clinically indicated.    (Z51.5) Hospice care patient  Comment: Observed to be resting comfortably, in no acute distress.   Plan: Continue with comfort measures.     (N32.89) Bladder mass  Comment: Likely bladder cancer. No difficulties with urination.   Plan: Continue Flomax.    Orders:  Decrease Sennosides to 1 tablet PO daily.       Electronically signed by  Mague Tamayo DNP Student    Patient seen and examined along with the nurse practitioner student. The HPI and ROS were obtained by the student and confirmed by myself. All objective, assessments, and plans were completed by myself  Laureen AVENDANO, CNP

## 2017-10-16 NOTE — PROGRESS NOTES
[facility]    Chief Complaint   Patient presents with     RECHECK       HPI:    Bong Noguera is a 82 year old  (1935), who is being seen today for an episodic care visit at [facility].  Hospital stay was at [facility] from 9/15/17 through 9/29/17. PMH COPD, suspected bladder cancer, BPH who was admitted on 9/15/2017 with acute hypoxic respiratory failure and severe sepsis. He was not improving with treatment and after goals of care discussion, he opted for comfort care. Admitted to this facility for hospice.    HPI information obtained from: facility chart records, facility staff, patient report and [facility] chart review.    Today's concern is:  Chronic obstructive pulmonary disease, unspecified COPD type (H)  Currently on 4L oxygen with saturations >92%. Inhaled medications changed to Nebulizer treatments 10/5/17, patient says this is working better. Oral suctioning per self for reported thick, yellow secretions with relief.     Loose stools  Senna changed from BID to daily due to 1-2 stools per day, formed to loose. Patient denies diarrhea currently. Staff believes bowels are moving well    Hospice care patient  On [facility].  Comfort care measures in place. Patient has no acute concerns    Bladder mass  Suspected bladder cancer. No treatment at this time. No current difficulty with urination, no hematuria.        ALLERGIES: Ciprofloxacin; Clindamycin hcl; Hydrocodone; and Tylenol with codeine [acetaminophen-codeine]  Past Medical, Surgical, Family and Social History reviewed and updated in [facility].    Current Outpatient Prescriptions   Medication Sig Dispense Refill     nystatin (MYCOSTATIN) 569182 UNIT/GM POWD Apply to groins, buttocks topically three times a day for rash       sennosides (SENOKOT) 8.6 MG tablet Take 1 tablet by mouth daily       Nutritional Supplements (ENSURE) LIQD Take by mouth 2 times daily Give 120mL       MELATONIN PO Take 5 mg by  mouth At Bedtime       ipratropium - albuterol 0.5 mg/2.5 mg/3 mL (DUONEB) 0.5-2.5 (3) MG/3ML neb solution TID and q4h prn 30 vial 1     morphine sulfate HIGH CONCENTRATE (ROXANOL *CONCENTRATED*) 100 MG/5ML (HIGH CONC) solution Place 0.5 mLs (10 mg) under the tongue every 2 hours as needed for moderate to severe pain or other (or dyspnea)  0     mirtazapine (REMERON) 7.5 MG TABS tablet Take 1 tablet (7.5 mg) by mouth At Bedtime 60 tablet      oxymetazoline (AFRIN) 0.05 % spray Spray 2 sprays into both nostrils 2 times daily as needed for congestion       nicotine polacrilex (NICORETTE) 2 MG gum Place 1 each (2 mg) inside cheek every hour as needed for smoking cessation 30 tablet      LORazepam (ATIVAN) 0.5 MG tablet Take 0.5 tablets (0.25 mg) by mouth every 4 hours as needed for anxiety 60 tablet      bisacodyl (DULCOLAX) 10 MG Suppository Place 1 suppository (10 mg) rectally daily as needed for constipation 25 suppository 1     acetaminophen (TYLENOL) 650 MG Suppository Place 1 suppository (650 mg) rectally every 4 hours as needed for fever 60 suppository      atropine 1 % SOLN Place 2 drops under the tongue every 2 hours as needed for secretions       tamsulosin (FLOMAX) 0.4 MG capsule Take 0.4 mg by mouth once a week       fluticasone (FLONASE) 50 MCG/ACT spray Spray 1 spray into both nostrils daily as needed for rhinitis          Medications reviewed:  Medications reconciled to facility chart and changes were made to reflect current medications as identified as above med list.       REVIEW OF SYSTEMS:  4 point ROS including Respiratory, CV, GI and , other than that noted in the HPI,  is negative    Physical Exam:  /68  Pulse 125  Temp 98.4  F (36.9  C)  Resp 20  Wt 144 lb 8 oz (65.5 kg)  SpO2 90%  BMI 17.14 kg/m2   GENERAL APPEARANCE:  Alert, oriented, thin  ENT:  oral mucous membranes moist, poor dentition  EYES:  EOM, conjunctivae, lids, pupils and irises normal  NECK:  No adenopathy,masses or  thyromegaly  RESP:  no respiratory distress, crackles throughout, cough loose  CV:  Palpation and auscultation of heart done , regular rate and rhythm, no murmur, rub, or gallop  ABDOMEN:  normal bowel sounds, soft, nontender, no hepatosplenomegaly or other masses  PSYCH:  oriented X 3, normal insight, judgement and memory, affect abnormal flat        Assessment/Plan:  (J44.9) Chronic obstructive pulmonary disease, unspecified COPD type (H)  (primary encounter diagnosis)  Comment: No acute distress.   Plan: Continue with comfort care measures, no changes at this time. Continue to monitor symptom control and respiratory status.     (R19.5) Loose stools  Comment: Bowels moving well currently without diarrhea  Plan: Cont Senna daily. Monitor bowel function. Adjust medication as clinically indicated.    (Z51.5) Hospice care patient  Comment: On hospice for end stage COPD, probable bladder cancer. Goal comfort care    (N32.89) Bladder mass  Comment: Likely bladder cancer. No difficulties with urination.   Plan: Continue Flomax. Monitor for s/sx retention        Electronically signed by  ROMANA Solomon [facility]  Pager: [phone]

## 2019-06-17 PROBLEM — I50.9 HEART FAILURE (H): Status: ACTIVE | Noted: 2017-01-01
